# Patient Record
Sex: FEMALE | Race: WHITE | NOT HISPANIC OR LATINO | URBAN - NONMETROPOLITAN AREA
[De-identification: names, ages, dates, MRNs, and addresses within clinical notes are randomized per-mention and may not be internally consistent; named-entity substitution may affect disease eponyms.]

---

## 2023-06-13 ENCOUNTER — PREP FOR SURGERY (OUTPATIENT)
Dept: SURGERY | Facility: HOSPITAL | Age: 78
End: 2023-06-13
Payer: MEDICARE

## 2023-06-13 DIAGNOSIS — H25.12 NUCLEAR SCLEROTIC CATARACT OF LEFT EYE: Primary | ICD-10-CM

## 2023-06-13 RX ORDER — SODIUM CHLORIDE 9 MG/ML
40 INJECTION, SOLUTION INTRAVENOUS AS NEEDED
OUTPATIENT
Start: 2023-06-13

## 2023-06-13 RX ORDER — PREDNISOLONE ACETATE 10 MG/ML
1 SUSPENSION/ DROPS OPHTHALMIC SEE ADMIN INSTRUCTIONS
OUTPATIENT
Start: 2023-06-13

## 2023-06-13 RX ORDER — SODIUM CHLORIDE 0.9 % (FLUSH) 0.9 %
10 SYRINGE (ML) INJECTION AS NEEDED
OUTPATIENT
Start: 2023-06-13

## 2023-06-13 RX ORDER — CYCLOPENT/TROPIC/PHEN/KETR/WAT 1%-1%-2.5%
1 DROPS (EA) OPHTHALMIC (EYE)
OUTPATIENT
Start: 2023-06-13 | End: 2023-06-13

## 2023-06-13 RX ORDER — TETRACAINE HYDROCHLORIDE 5 MG/ML
1 SOLUTION OPHTHALMIC SEE ADMIN INSTRUCTIONS
OUTPATIENT
Start: 2023-06-13

## 2023-06-13 RX ORDER — SODIUM CHLORIDE 0.9 % (FLUSH) 0.9 %
10 SYRINGE (ML) INJECTION EVERY 12 HOURS SCHEDULED
OUTPATIENT
Start: 2023-06-13

## 2023-06-14 PROBLEM — H25.12 NUCLEAR SCLEROTIC CATARACT OF LEFT EYE: Status: ACTIVE | Noted: 2023-06-14

## 2024-11-19 ENCOUNTER — TELEPHONE (OUTPATIENT)
Dept: CARDIAC SURGERY | Facility: CLINIC | Age: 79
End: 2024-11-19
Payer: MEDICARE

## 2024-11-19 ENCOUNTER — HOSPITAL ENCOUNTER (OUTPATIENT)
Dept: CARDIOLOGY | Facility: HOSPITAL | Age: 79
Discharge: HOME | End: 2024-11-19
Payer: MEDICARE

## 2024-11-19 DIAGNOSIS — Z00.6 ENCOUNTER FOR EXAMINATION FOR NORMAL COMPARISON AND CONTROL IN CLINICAL RESEARCH PROGRAM: ICD-10-CM

## 2024-11-19 NOTE — TELEPHONE ENCOUNTER
Provider: BRITTANY    Caller: Abena Washington    Relationship to Patient: Self    Phone Number: [phone]    Reason for Call:     PER PT TO COLLECT HER IMAGING ST PATEL REQUIRES AN IMAGING REQUEST FORM BE SENT TO   [phone]    PT HAS HAD ECHO & STRESS TEST PERFORMED OCT 11    & A HEART CATH ON OCT 30TH     PT WOULD LIKE A PHONE CALL WHEN IMAGING IS RECEIVED

## 2024-12-03 ENCOUNTER — HOSPITAL ENCOUNTER (OUTPATIENT)
Dept: CARDIOLOGY | Facility: HOSPITAL | Age: 79
Discharge: HOME | End: 2024-12-03

## 2024-12-03 DIAGNOSIS — Z00.6 ENCOUNTER FOR EXAMINATION FOR NORMAL COMPARISON OR CONTROL IN CLINICAL RESEARCH PROGRAM: ICD-10-CM

## 2025-01-15 ENCOUNTER — OFFICE VISIT (OUTPATIENT)
Dept: CARDIAC SURGERY | Facility: CLINIC | Age: 80
End: 2025-01-15
Payer: MEDICARE

## 2025-01-15 VITALS
BODY MASS INDEX: 21.83 KG/M2 | TEMPERATURE: 97.9 F | HEART RATE: 57 BPM | SYSTOLIC BLOOD PRESSURE: 138 MMHG | DIASTOLIC BLOOD PRESSURE: 58 MMHG | WEIGHT: 118.6 LBS | HEIGHT: 62 IN | OXYGEN SATURATION: 99 %

## 2025-01-15 DIAGNOSIS — I25.119 CORONARY ARTERY DISEASE INVOLVING NATIVE CORONARY ARTERY OF NATIVE HEART WITH ANGINA PECTORIS: Primary | ICD-10-CM

## 2025-01-15 DIAGNOSIS — I25.10 CORONARY ARTERY DISEASE INVOLVING NATIVE CORONARY ARTERY OF NATIVE HEART, UNSPECIFIED WHETHER ANGINA PRESENT: Primary | ICD-10-CM

## 2025-01-15 RX ORDER — SACUBITRIL AND VALSARTAN 49; 51 MG/1; MG/1
1 TABLET, FILM COATED ORAL 2 TIMES DAILY
COMMUNITY

## 2025-01-15 RX ORDER — ALENDRONATE SODIUM 70 MG/1
70 TABLET ORAL
COMMUNITY
Start: 2024-11-25

## 2025-01-15 RX ORDER — FLUTICASONE PROPIONATE 50 MCG
2 SPRAY, SUSPENSION (ML) NASAL DAILY
COMMUNITY

## 2025-01-15 NOTE — PROGRESS NOTES
[facility] Cardiothoracic Surgery Office Follow Up Note     Date of Encounter: 01/15/2025     Name: Abena Washington  : 1945     Referred By: Eddi Cheatham MD  PCP: Eddi Cheatham MD    Chief Complaint:    Chief Complaint   Patient presents with    Coronary Artery Disease     New patient per Dr. Eddi Cheatham for 2nd opinion CABG eval        Subjective      History of Present Illness:    Abena Washington is a 79 y.o. female presents for second opinion of CAD. She had cardiac catheterization on 10/30/24 at [facility] and was found to have severe three vessel disease including 30-40% left main stenosis, 100% chronic total occlusion of LAD, 70% diagonal branch, 70% left circumflex, 70% stenosis OM1, and 95% RCA stenosis.     Patient is having very frequent episodes of chest pressure. This is with activity and at rest, and even exposure to cold air can trigger an episode of chest pressure. This is relieved with belching and episodes resolve quickly. She is having numbness and tingling of the fingers bilaterally.   She has a strong family history of CAD. Dr. Del Castillo performed a CABG on her son about 8 years ago, which is why she felt strongly about getting a second opinion.     She denies previous thoracic surgery or radiation to the chest. She has recently had an excision of a skin lesion found to be melanoma on her right upper scapular region but has received no further treatment as of yet.   Of note, when the cardiac catheterization was performed, patient states they attempted access via right radial but were unable and had to use femoral access. Patient is right handed.     Review of Systems:  Review of Systems   Constitutional: Negative for chills, decreased appetite, diaphoresis, fever, malaise/fatigue, night sweats, weight gain and weight loss.   HENT:  Negative for hoarse voice.    Eyes:  Negative for blurred vision, double vision and visual disturbance.   Cardiovascular:  Positive for chest pain (pressure  "in chest, sometimes slight \"twinge\" of pain). Negative for claudication, dyspnea on exertion, irregular heartbeat, leg swelling, near-syncope, orthopnea, palpitations, paroxysmal nocturnal dyspnea and syncope.   Respiratory:  Negative for cough, hemoptysis, shortness of breath, sputum production and wheezing.    Hematologic/Lymphatic: Negative for adenopathy and bleeding problem. Does not bruise/bleed easily.   Skin:  Negative for color change, nail changes, poor wound healing and rash.   Musculoskeletal:  Negative for back pain, falls and muscle cramps.   Gastrointestinal:  Positive for heartburn (burping with the episodes of chest pressure). Negative for abdominal pain and dysphagia.   Genitourinary:  Negative for flank pain.   Neurological:  Positive for dizziness. Negative for brief paralysis, disturbances in coordination, focal weakness, headaches, light-headedness, loss of balance, numbness, paresthesias, sensory change, vertigo and weakness.   Psychiatric/Behavioral:  Negative for depression and suicidal ideas.    Allergic/Immunologic: Negative for persistent infections.       I have reviewed the following portions of the patient's history: problem list, current medications, allergies, past surgical history, past medical history, past social history, past family history, and ROS and confirm it's accurate.    Allergies:  No Known Allergies    Medications:      Current Outpatient Medications:     alendronate (FOSAMAX) 70 MG tablet, Take 1 tablet by mouth Every 7 (Seven) Days., Disp: , Rfl:     amLODIPine (NORVASC) 10 MG tablet, Take 1 tablet by mouth Daily., Disp: , Rfl:     aspirin 81 MG EC tablet, Take 1 tablet by mouth Daily., Disp: , Rfl:     carvedilol (COREG) 12.5 MG tablet, Take 1 tablet by mouth 2 (Two) Times a Day With Meals., Disp: , Rfl:     empagliflozin (JARDIANCE) 10 MG tablet tablet, Take  by mouth Daily., Disp: , Rfl:     fluticasone (FLONASE) 50 MCG/ACT nasal spray, Administer 2 sprays into the " nostril(s) as directed by provider Daily., Disp: , Rfl:     gabapentin (NEURONTIN) 300 MG capsule, Take 1 capsule by mouth 2 (Two) Times a Day As Needed., Disp: , Rfl:     glipizide (GLUCOTROL) 10 MG tablet, Take 2 tablets by mouth 2 (Two) Times a Day Before Meals., Disp: , Rfl:     hydroCHLOROthiazide (HYDRODIURIL) 25 MG tablet, Take 1 tablet by mouth Daily., Disp: , Rfl:     metFORMIN (GLUCOPHAGE) 1000 MG tablet, Take 1 tablet by mouth 2 (Two) Times a Day With Meals., Disp: , Rfl:     naproxen (NAPROSYN) 500 MG tablet, Take 1 tablet by mouth 2 (Two) Times a Day As Needed for Mild Pain., Disp: , Rfl:     pioglitazone (ACTOS) 45 MG tablet, Take 1 tablet by mouth Daily As Needed., Disp: , Rfl:     potassium chloride (K-DUR,KLOR-CON) 20 MEQ CR tablet, Take 1 tablet by mouth 2 (Two) Times a Day., Disp: , Rfl:     potassium chloride (KLOR-CON) 20 MEQ packet, Take 20 mEq by mouth Daily., Disp: , Rfl:     sacubitril-valsartan (Entresto) 49-51 MG tablet, Take 1 tablet by mouth 2 (Two) Times a Day., Disp: , Rfl:     SITagliptin (JANUVIA) 100 MG tablet, Take 1 tablet by mouth Daily., Disp: , Rfl:     difluprednate (DUREZOL) 0.05 % ophthalmic emulsion, Follow Instructions on AVS (Patient not taking: Reported on 1/15/2025), Disp: , Rfl:     difluprednate (DUREZOL) 0.05 % ophthalmic emulsion, Follow Instructions on AVS (Patient not taking: Reported on 1/15/2025), Disp: , Rfl:     enalapril (VASOTEC) 20 MG tablet, Take 1 tablet by mouth 2 (Two) Times a Day. (Patient not taking: Reported on 1/15/2025), Disp: , Rfl:     lovastatin (MEVACOR) 40 MG tablet, Take 1 tablet by mouth 2 (Two) Times a Day. (Patient not taking: Reported on 1/15/2025), Disp: , Rfl:     History:   Past Medical History:   Diagnosis Date    Anemia     Anxiety and depression     Arthritis     Delayed emergence from anesthesia     Diabetes mellitus     Heart attack     Hyperlipidemia     Hypertension     Nausea     Peripheral neuropathy     PONV (postoperative  "nausea and vomiting)     Skin melanoma     Wears glasses        Past Surgical History:   Procedure Laterality Date    CARDIAC CATHETERIZATION      CATARACT EXTRACTION W/ INTRAOCULAR LENS IMPLANT Left 06/21/2023    Procedure: CATARACT PHACO EXTRACTION WITH INTRAOCULAR LENS IMPLANT LEFT;  Surgeon: Mina Hendrickson MD;  Location: [facility];  Service: Ophthalmology;  Laterality: Left;    CATARACT EXTRACTION W/ INTRAOCULAR LENS IMPLANT Right 07/07/2023    Procedure: CATARACT PHACO EXTRACTION WITH INTRAOCULAR LENS IMPLANT RIGHT;  Surgeon: Mina Hendrickson MD;  Location: [facility];  Service: Ophthalmology;  Laterality: Right;    CHOLECYSTECTOMY      COLONOSCOPY      ENDOSCOPY      HYSTERECTOMY      SKIN CANCER EXCISION      TUBAL ABDOMINAL LIGATION         Social History     Socioeconomic History    Marital status: Unknown   Tobacco Use    Smoking status: Never    Smokeless tobacco: Never   Vaping Use    Vaping status: Never Used   Substance and Sexual Activity    Alcohol use: Never    Drug use: Never    Sexual activity: Defer        Family History   Problem Relation Age of Onset    Diabetes Mother     Dementia Mother     Coronary artery disease Father     Stroke Son     Coronary artery disease Son        Objective     Physical Exam:  Vitals:    01/15/25 0957   BP: 138/58   BP Location: Right arm   Patient Position: Sitting   Pulse: 57   Temp: 97.9 °F (36.6 °C)   SpO2: 99%   Weight: 53.8 kg (118 lb 9.6 oz)   Height: 157.5 cm (62\")      Body mass index is 21.69 kg/m².    Physical Exam  Vitals and nursing note reviewed.   Constitutional:       Appearance: Normal appearance.   Cardiovascular:      Rate and Rhythm: Normal rate and regular rhythm.      Pulses: Normal pulses.           Radial pulses are 2+ on the right side and 2+ on the left side.      Heart sounds: Normal heart sounds.   Pulmonary:      Effort: Pulmonary effort is normal.      Breath sounds: Normal breath sounds.   Musculoskeletal:      Right lower " leg: No edema.      Left lower leg: No edema.   Skin:     Capillary Refill: Capillary refill takes less than 2 seconds.   Neurological:      General: No focal deficit present.      Mental Status: She is alert and oriented to person, place, and time.   Psychiatric:         Mood and Affect: Mood normal.         Behavior: Behavior normal.         Imaging/Labs:  Cath images and report reviewed       Assessment / Plan      Assessment / Plan:    Severe three vessel CAD   30-40% left main stenosis, 100% chronic total occlusion of LAD, 70% diagonal branch, 70% left circumflex, 70% stenosis OM1, and 95% RCA stenosis  Patient has diabetes A1c 6.9%, HTN, HLD and is a reasonable candidate for CABG.   Discussed the procedure in great detail and answered all questions. Risks, benefits, and alternatives were presented to the patient and through shared decision making, the patient elects to proceed with procedure.   Episodes of chest pressure several days of the week. Advised patient to present to ED with further episodes of chest pressure  Discussed LEAAPS trial, but patient not a candidate due to sensitivity to nickel jewelry.       Follow Up:   Schedule for CABG as soon as possible   Advised patient that if she has further episodes of chest pressure, she needs to present to the nearest ED      Mine Hanson PA-C   [facility] Cardiothoracic Surgery      Time Spent: I spent 45 minutes caring for Abena on this date of service. This time includes time spent by me in the following activities: preparing for the visit, reviewing tests, obtaining and/or reviewing a separately obtained history, performing a medically appropriate examination and/or evaluation, counseling and educating the patient/family/caregiver, ordering medications, tests, or procedures, referring and communicating with other health care professionals, documenting information in the medical record, independently interpreting results and communicating that  information with the patient/family/caregiver, and care coordination.

## 2025-01-16 ENCOUNTER — HOSPITAL ENCOUNTER (OUTPATIENT)
Dept: ULTRASOUND IMAGING | Facility: HOSPITAL | Age: 80
Discharge: HOME | End: 2025-01-16
Admitting: PHYSICIAN ASSISTANT
Payer: MEDICARE

## 2025-01-16 ENCOUNTER — PREP FOR SURGERY (OUTPATIENT)
Dept: OTHER | Facility: HOSPITAL | Age: 80
End: 2025-01-16
Payer: MEDICARE

## 2025-01-16 DIAGNOSIS — Z51.81 ENCOUNTER FOR THERAPEUTIC DRUG LEVEL MONITORING: ICD-10-CM

## 2025-01-16 DIAGNOSIS — R79.1 ABNORMAL COAGULATION PROFILE: ICD-10-CM

## 2025-01-16 DIAGNOSIS — I25.10 CORONARY ARTERY DISEASE INVOLVING NATIVE CORONARY ARTERY OF NATIVE HEART, UNSPECIFIED WHETHER ANGINA PRESENT: ICD-10-CM

## 2025-01-16 DIAGNOSIS — I25.119 CORONARY ARTERY DISEASE INVOLVING NATIVE CORONARY ARTERY OF NATIVE HEART WITH ANGINA PECTORIS: Primary | ICD-10-CM

## 2025-01-16 DIAGNOSIS — R79.9 ABNORMAL FINDING OF BLOOD CHEMISTRY, UNSPECIFIED: ICD-10-CM

## 2025-01-16 PROCEDURE — 93880 EXTRACRANIAL BILAT STUDY: CPT

## 2025-01-17 RX ORDER — CHLORHEXIDINE GLUCONATE 500 MG/1
CLOTH TOPICAL EVERY 12 HOURS PRN
OUTPATIENT
Start: 2025-01-21

## 2025-01-17 RX ORDER — CHLORHEXIDINE GLUCONATE ORAL RINSE 1.2 MG/ML
15 SOLUTION DENTAL ONCE
OUTPATIENT
Start: 2025-01-21 | End: 2025-01-21

## 2025-01-17 RX ORDER — NITROGLYCERIN 0.4 MG/1
0.4 TABLET SUBLINGUAL
OUTPATIENT
Start: 2025-01-21

## 2025-01-17 RX ORDER — GABAPENTIN 300 MG/1
300 CAPSULE ORAL ONCE
OUTPATIENT
Start: 2025-01-17 | End: 2025-01-17

## 2025-01-17 RX ORDER — ASPIRIN 325 MG
325 TABLET ORAL NIGHTLY
OUTPATIENT
Start: 2025-01-20 | End: 2025-01-21

## 2025-01-17 RX ORDER — CHLORHEXIDINE GLUCONATE 500 MG/1
1 CLOTH TOPICAL EVERY 12 HOURS PRN
OUTPATIENT
Start: 2025-01-20

## 2025-01-27 ENCOUNTER — HOSPITAL ENCOUNTER (OUTPATIENT)
Dept: GENERAL RADIOLOGY | Facility: HOSPITAL | Age: 80
Discharge: HOME | End: 2025-01-27
Payer: MEDICARE

## 2025-01-27 ENCOUNTER — HOSPITAL ENCOUNTER (OUTPATIENT)
Dept: PULMONOLOGY | Facility: HOSPITAL | Age: 80
Discharge: HOME | End: 2025-01-27
Payer: MEDICARE

## 2025-01-27 ENCOUNTER — PRE-ADMISSION TESTING (OUTPATIENT)
Dept: PREADMISSION TESTING | Facility: HOSPITAL | Age: 80
DRG: 235 | End: 2025-01-27
Payer: MEDICARE

## 2025-01-27 VITALS — HEIGHT: 62 IN | OXYGEN SATURATION: 99 % | HEART RATE: 68 BPM | BODY MASS INDEX: 22.19 KG/M2 | WEIGHT: 120.59 LBS

## 2025-01-27 DIAGNOSIS — R79.1 ABNORMAL COAGULATION PROFILE: ICD-10-CM

## 2025-01-27 DIAGNOSIS — R79.9 ABNORMAL FINDING OF BLOOD CHEMISTRY, UNSPECIFIED: ICD-10-CM

## 2025-01-27 DIAGNOSIS — Z51.81 ENCOUNTER FOR THERAPEUTIC DRUG LEVEL MONITORING: ICD-10-CM

## 2025-01-27 DIAGNOSIS — I25.119 CORONARY ARTERY DISEASE INVOLVING NATIVE CORONARY ARTERY OF NATIVE HEART WITH ANGINA PECTORIS: ICD-10-CM

## 2025-01-27 LAB
ABO GROUP BLD: NORMAL
ALBUMIN SERPL-MCNC: 4.7 G/DL (ref 3.5–5.2)
ALBUMIN/GLOB SERPL: 1.5 G/DL
ALP SERPL-CCNC: 51 U/L (ref 39–117)
ALT SERPL W P-5'-P-CCNC: 14 U/L (ref 1–33)
AMPHET+METHAMPHET UR QL: NEGATIVE
AMPHETAMINES UR QL: NEGATIVE
ANION GAP SERPL CALCULATED.3IONS-SCNC: 14 MMOL/L (ref 5–15)
APTT PPP: 21.7 SECONDS (ref 22–39)
AST SERPL-CCNC: 15 U/L (ref 1–32)
BARBITURATES UR QL SCN: NEGATIVE
BASOPHILS # BLD AUTO: 0.06 10*3/MM3 (ref 0–0.2)
BASOPHILS NFR BLD AUTO: 0.7 % (ref 0–1.5)
BENZODIAZ UR QL SCN: NEGATIVE
BILIRUB SERPL-MCNC: 0.2 MG/DL (ref 0–1.2)
BLD GP AB SCN SERPL QL: NEGATIVE
BUN SERPL-MCNC: 25 MG/DL (ref 8–23)
BUN/CREAT SERPL: 22.3 (ref 7–25)
BUPRENORPHINE SERPL-MCNC: NEGATIVE NG/ML
CALCIUM SPEC-SCNC: 9.7 MG/DL (ref 8.6–10.5)
CANNABINOIDS SERPL QL: NEGATIVE
CHLORIDE SERPL-SCNC: 98 MMOL/L (ref 98–107)
CO2 SERPL-SCNC: 27 MMOL/L (ref 22–29)
COCAINE UR QL: NEGATIVE
CREAT SERPL-MCNC: 1.12 MG/DL (ref 0.57–1)
DEPRECATED RDW RBC AUTO: 45.6 FL (ref 37–54)
EGFRCR SERPLBLD CKD-EPI 2021: 50.8 ML/MIN/1.73
EOSINOPHIL # BLD AUTO: 0.1 10*3/MM3 (ref 0–0.4)
EOSINOPHIL NFR BLD AUTO: 1.2 % (ref 0.3–6.2)
ERYTHROCYTE [DISTWIDTH] IN BLOOD BY AUTOMATED COUNT: 13.5 % (ref 12.3–15.4)
FENTANYL UR-MCNC: NEGATIVE NG/ML
GLOBULIN UR ELPH-MCNC: 3.1 GM/DL
GLUCOSE SERPL-MCNC: 102 MG/DL (ref 65–99)
HBA1C MFR BLD: 8.3 % (ref 4.8–5.6)
HCT VFR BLD AUTO: 42.9 % (ref 34–46.6)
HGB BLD-MCNC: 13.6 G/DL (ref 12–15.9)
IMM GRANULOCYTES # BLD AUTO: 0.02 10*3/MM3 (ref 0–0.05)
IMM GRANULOCYTES NFR BLD AUTO: 0.2 % (ref 0–0.5)
INR PPP: 0.91 (ref 0.89–1.12)
LYMPHOCYTES # BLD AUTO: 2.81 10*3/MM3 (ref 0.7–3.1)
LYMPHOCYTES NFR BLD AUTO: 34.6 % (ref 19.6–45.3)
MAGNESIUM SERPL-MCNC: 2.2 MG/DL (ref 1.6–2.4)
MCH RBC QN AUTO: 28.9 PG (ref 26.6–33)
MCHC RBC AUTO-ENTMCNC: 31.7 G/DL (ref 31.5–35.7)
MCV RBC AUTO: 91.1 FL (ref 79–97)
METHADONE UR QL SCN: NEGATIVE
MONOCYTES # BLD AUTO: 0.51 10*3/MM3 (ref 0.1–0.9)
MONOCYTES NFR BLD AUTO: 6.3 % (ref 5–12)
NEUTROPHILS NFR BLD AUTO: 4.62 10*3/MM3 (ref 1.7–7)
NEUTROPHILS NFR BLD AUTO: 57 % (ref 42.7–76)
NRBC BLD AUTO-RTO: 0 /100 WBC (ref 0–0.2)
OPIATES UR QL: NEGATIVE
OXYCODONE UR QL SCN: NEGATIVE
PA ADP PRP-ACNC: 216 PRU
PCP UR QL SCN: NEGATIVE
PLATELET # BLD AUTO: 282 10*3/MM3 (ref 140–450)
PMV BLD AUTO: 11.5 FL (ref 6–12)
POTASSIUM SERPL-SCNC: 4.2 MMOL/L (ref 3.5–5.2)
PROT SERPL-MCNC: 7.8 G/DL (ref 6–8.5)
PROTHROMBIN TIME: 12.4 SECONDS (ref 12.2–14.5)
QT INTERVAL: 286 MS
QTC INTERVAL: 297 MS
RBC # BLD AUTO: 4.71 10*6/MM3 (ref 3.77–5.28)
RH BLD: POSITIVE
SODIUM SERPL-SCNC: 139 MMOL/L (ref 136–145)
T&S EXPIRATION DATE: NORMAL
TRICYCLICS UR QL SCN: NEGATIVE
WBC NRBC COR # BLD AUTO: 8.12 10*3/MM3 (ref 3.4–10.8)

## 2025-01-27 PROCEDURE — 93005 ELECTROCARDIOGRAM TRACING: CPT

## 2025-01-27 PROCEDURE — 86901 BLOOD TYPING SEROLOGIC RH(D): CPT

## 2025-01-27 PROCEDURE — 85025 COMPLETE CBC W/AUTO DIFF WBC: CPT

## 2025-01-27 PROCEDURE — 71046 X-RAY EXAM CHEST 2 VIEWS: CPT

## 2025-01-27 PROCEDURE — 85576 BLOOD PLATELET AGGREGATION: CPT

## 2025-01-27 PROCEDURE — 93010 ELECTROCARDIOGRAM REPORT: CPT | Performed by: INTERNAL MEDICINE

## 2025-01-27 PROCEDURE — 85610 PROTHROMBIN TIME: CPT

## 2025-01-27 PROCEDURE — 86923 COMPATIBILITY TEST ELECTRIC: CPT

## 2025-01-27 PROCEDURE — 86850 RBC ANTIBODY SCREEN: CPT

## 2025-01-27 PROCEDURE — 83735 ASSAY OF MAGNESIUM: CPT

## 2025-01-27 PROCEDURE — 86900 BLOOD TYPING SEROLOGIC ABO: CPT

## 2025-01-27 PROCEDURE — 94010 BREATHING CAPACITY TEST: CPT

## 2025-01-27 PROCEDURE — 83036 HEMOGLOBIN GLYCOSYLATED A1C: CPT

## 2025-01-27 PROCEDURE — 36415 COLL VENOUS BLD VENIPUNCTURE: CPT

## 2025-01-27 PROCEDURE — 80053 COMPREHEN METABOLIC PANEL: CPT

## 2025-01-27 PROCEDURE — 85730 THROMBOPLASTIN TIME PARTIAL: CPT

## 2025-01-27 PROCEDURE — 80307 DRUG TEST PRSMV CHEM ANLYZR: CPT

## 2025-01-27 RX ORDER — MULTIVITAMIN,THERAPEUTIC
0.5 TABLET ORAL DAILY
Status: ON HOLD | COMMUNITY

## 2025-01-27 RX ORDER — ASPIRIN 325 MG
325 TABLET ORAL NIGHTLY
Status: ACTIVE | OUTPATIENT
Start: 2025-01-27 | End: 2025-01-28

## 2025-01-27 RX ORDER — ATORVASTATIN CALCIUM 40 MG/1
40 TABLET, FILM COATED ORAL DAILY
Status: ON HOLD | COMMUNITY
Start: 2025-01-09

## 2025-01-27 RX ORDER — CHLORHEXIDINE GLUCONATE 500 MG/1
1 CLOTH TOPICAL EVERY 12 HOURS PRN
Status: ACTIVE | OUTPATIENT
Start: 2025-01-27

## 2025-01-27 NOTE — PAT
An arrival time for procedure was not provided during PAT visit. If patient had any questions or concerns about their arrival time, they were instructed to contact their surgeon/physician.  Additionally, if the patient referred to an arrival time that was acquired from their my chart account, patient was encouraged to verify that time with their surgeon/physician. Arrival times are NOT provided in Pre Admission Testing Department.    Patient viewed general PAT education video as instructed in their preoperative information received from their surgeon.  Patient stated the general PAT education video was viewed in its entirety and survey completed.  Copies of PAT general education handouts (Incentive Spirometry, Meds to Beds Program, Patient Belongings, Pre-op skin preparation instructions, Blood Glucose testing, Visitor policy, Surgery FAQ, Code H) distributed to patient if not printed. Education related to the PAT pass and skin preparation for surgery (if applicable) completed in PAT as a reinforcement to PAT education video. Patient instructed to return PAT pass provided today as well as completed skin preparation sheet (if applicable) on the day of procedure.     Additionally if patient had not viewed video yet but intended to view it at home or in our waiting area, then referred them to the handout with QR code/link provided during PAT visit.  Instructed patient to complete survey after viewing the video in its entirety.  Encouraged patient/family to read PAT general education handouts thoroughly and notify PAT staff with any questions or concerns. Patient verbalized understanding of all information and priority content.    Patient denies any current skin issues.     Patient to apply Chlorhexadine wipes  to surgical area (as instructed) the night before procedure and the AM of procedure. Wipes provided.    Patient instructed to drink 20 ounces of Gatorade or Gatorlyte (if diabetic) and it needs to be completed 1  hour (for Main OR patients) or 2 hours (scheduled  section & [facility] patients) before given arrival time for procedure (NO RED Gatorade and NO Gatorade Zero).    Patient verbalized understanding.    Bactroban to be applied to each nostril during PAT visit if surgery the following day.  If surgery NOT the following day, then Bactroban supplied to patient with instructions both written and verbally to insert Bactroban into each nares the night before surgery.    Per Anesthesia Request, patient instructed not to take their ACE/ARB medications on the AM of surgery.    CABG Education during PAT visit included general perioperative instructions that are routine for all surgical patients (PAT PASS, wipes, directions to pre-op,etc.).  Additionally, a handout was provided and discussed that stressed the importance of Honesty, Incentive Spirometry use, Ambulation, and Pain control.  This handout also explained the tubes in place during immediate post op recovery.  Verbal instructions given as well.     Patient and/or family verbalized understanding of education and reinforcement was provided as needed.    Instructed patient to take 325 mg of Aspirin the night before heart surgery as per CT surgeon's order.  Patient and/or family verbalized understanding.    Blood bank bracelet applied to patient during Pre Admission Testing visit.  Patient instructed not to remove from arm until after procedure and they are discharged from the hospital.  Explained to patient that they may shower and get the bracelet wet, but not to immerse under water for longer periods (bathing, swimming, hand dishwashing, etc).  Patient verbalized understanding.    Patient directed to Radiology Department for CXR after Pre Admission Testing Appointment.     Patient directed to Respiratory Department after Pre Admission Testing visit for Pulmonary Function Test.

## 2025-01-28 ENCOUNTER — ANESTHESIA EVENT (OUTPATIENT)
Dept: PERIOP | Facility: HOSPITAL | Age: 80
End: 2025-01-28
Payer: MEDICARE

## 2025-01-28 ENCOUNTER — ANESTHESIA EVENT CONVERTED (OUTPATIENT)
Dept: ANESTHESIOLOGY | Facility: HOSPITAL | Age: 80
DRG: 235 | End: 2025-01-28
Payer: MEDICARE

## 2025-01-28 ENCOUNTER — HOSPITAL ENCOUNTER (INPATIENT)
Facility: HOSPITAL | Age: 80
LOS: 8 days | Discharge: HOME HEALTH | DRG: 235 | End: 2025-02-05
Attending: THORACIC SURGERY (CARDIOTHORACIC VASCULAR SURGERY) | Admitting: THORACIC SURGERY (CARDIOTHORACIC VASCULAR SURGERY)
Payer: MEDICARE

## 2025-01-28 ENCOUNTER — ANCILLARY PROCEDURE (OUTPATIENT)
Dept: PERIOP | Facility: HOSPITAL | Age: 80
DRG: 235 | End: 2025-01-28
Payer: MEDICARE

## 2025-01-28 ENCOUNTER — ANESTHESIA (OUTPATIENT)
Dept: PERIOP | Facility: HOSPITAL | Age: 80
End: 2025-01-28
Payer: MEDICARE

## 2025-01-28 ENCOUNTER — APPOINTMENT (OUTPATIENT)
Dept: GENERAL RADIOLOGY | Facility: HOSPITAL | Age: 80
DRG: 235 | End: 2025-01-28
Payer: MEDICARE

## 2025-01-28 DIAGNOSIS — I25.119 CORONARY ARTERY DISEASE INVOLVING NATIVE CORONARY ARTERY OF NATIVE HEART WITH ANGINA PECTORIS: ICD-10-CM

## 2025-01-28 DIAGNOSIS — I25.10 CORONARY ARTERY DISEASE INVOLVING NATIVE CORONARY ARTERY OF NATIVE HEART, UNSPECIFIED WHETHER ANGINA PRESENT: ICD-10-CM

## 2025-01-28 LAB
ABO GROUP BLD: NORMAL
ACT BLD: 141 SECONDS (ref 82–152)
ALBUMIN SERPL-MCNC: 4.3 G/DL (ref 3.5–5.2)
ALBUMIN SERPL-MCNC: 4.7 G/DL (ref 3.5–5.2)
ANION GAP SERPL CALCULATED.3IONS-SCNC: 10 MMOL/L (ref 5–15)
ANION GAP SERPL CALCULATED.3IONS-SCNC: 12 MMOL/L (ref 5–15)
APTT PPP: 29.7 SECONDS (ref 22–39)
ARTERIAL PATENCY WRIST A: ABNORMAL
ATMOSPHERIC PRESS: ABNORMAL MM[HG]
BASE EXCESS BLDA CALC-SCNC: -0.2 MMOL/L (ref 0–2)
BASE EXCESS BLDA CALC-SCNC: -0.5 MMOL/L (ref 0–2)
BASE EXCESS BLDA CALC-SCNC: -0.9 MMOL/L (ref 0–2)
BASE EXCESS BLDA CALC-SCNC: 0.1 MMOL/L (ref 0–2)
BASE EXCESS BLDA CALC-SCNC: 0.3 MMOL/L (ref 0–2)
BASE EXCESS BLDA CALC-SCNC: 3.2 MMOL/L (ref 0–2)
BDY SITE: ABNORMAL
BODY TEMPERATURE: 37
BUN SERPL-MCNC: 18 MG/DL (ref 8–23)
BUN SERPL-MCNC: 18 MG/DL (ref 8–23)
BUN/CREAT SERPL: 15.3 (ref 7–25)
BUN/CREAT SERPL: 17 (ref 7–25)
CA-I SERPL ISE-MCNC: 1.18 MMOL/L (ref 1.15–1.3)
CALCIUM SPEC-SCNC: 9 MG/DL (ref 8.6–10.5)
CALCIUM SPEC-SCNC: 9.9 MG/DL (ref 8.6–10.5)
CHLORIDE SERPL-SCNC: 106 MMOL/L (ref 98–107)
CHLORIDE SERPL-SCNC: 108 MMOL/L (ref 98–107)
CO2 BLDA-SCNC: 27 MMOL/L (ref 22–33)
CO2 BLDA-SCNC: 27.5 MMOL/L (ref 22–33)
CO2 BLDA-SCNC: 28.3 MMOL/L (ref 22–33)
CO2 BLDA-SCNC: 28.4 MMOL/L (ref 22–33)
CO2 BLDA-SCNC: 28.8 MMOL/L (ref 22–33)
CO2 BLDA-SCNC: 29.5 MMOL/L (ref 22–33)
CO2 SERPL-SCNC: 28 MMOL/L (ref 22–29)
CO2 SERPL-SCNC: 28 MMOL/L (ref 22–29)
COHGB MFR BLD: 0.7 % (ref 0–2)
COHGB MFR BLD: 1 % (ref 0–2)
COHGB MFR BLD: 1.1 % (ref 0–2)
COHGB MFR BLD: 1.1 % (ref 0–2)
CREAT SERPL-MCNC: 1.06 MG/DL (ref 0.57–1)
CREAT SERPL-MCNC: 1.18 MG/DL (ref 0.57–1)
DEPRECATED RDW RBC AUTO: 44.5 FL (ref 37–54)
DEPRECATED RDW RBC AUTO: 47.3 FL (ref 37–54)
EGFRCR SERPLBLD CKD-EPI 2021: 47.7 ML/MIN/1.73
EGFRCR SERPLBLD CKD-EPI 2021: 54.2 ML/MIN/1.73
EPAP: 0
ERYTHROCYTE [DISTWIDTH] IN BLOOD BY AUTOMATED COUNT: 13.7 % (ref 12.3–15.4)
ERYTHROCYTE [DISTWIDTH] IN BLOOD BY AUTOMATED COUNT: 14.1 % (ref 12.3–15.4)
GLUCOSE BLDC GLUCOMTR-MCNC: 107 MG/DL (ref 70–130)
GLUCOSE BLDC GLUCOMTR-MCNC: 125 MG/DL (ref 70–130)
GLUCOSE BLDC GLUCOMTR-MCNC: 133 MG/DL (ref 70–130)
GLUCOSE BLDC GLUCOMTR-MCNC: 134 MG/DL (ref 70–130)
GLUCOSE BLDC GLUCOMTR-MCNC: 145 MG/DL (ref 70–130)
GLUCOSE BLDC GLUCOMTR-MCNC: 173 MG/DL (ref 70–130)
GLUCOSE BLDC GLUCOMTR-MCNC: 208 MG/DL (ref 70–130)
GLUCOSE BLDC GLUCOMTR-MCNC: 225 MG/DL (ref 70–130)
GLUCOSE BLDC GLUCOMTR-MCNC: 242 MG/DL (ref 70–130)
GLUCOSE BLDC GLUCOMTR-MCNC: 246 MG/DL (ref 70–130)
GLUCOSE BLDC GLUCOMTR-MCNC: 273 MG/DL (ref 70–130)
GLUCOSE SERPL-MCNC: 127 MG/DL (ref 65–99)
GLUCOSE SERPL-MCNC: 197 MG/DL (ref 65–99)
HCO3 BLDA-SCNC: 25.5 MMOL/L (ref 20–26)
HCO3 BLDA-SCNC: 26.4 MMOL/L (ref 20–26)
HCO3 BLDA-SCNC: 26.5 MMOL/L (ref 20–26)
HCO3 BLDA-SCNC: 26.8 MMOL/L (ref 20–26)
HCO3 BLDA-SCNC: 26.9 MMOL/L (ref 20–26)
HCO3 BLDA-SCNC: 27.6 MMOL/L (ref 20–26)
HCT VFR BLD AUTO: 25.1 % (ref 34–46.6)
HCT VFR BLD AUTO: 26.1 % (ref 34–46.6)
HCT VFR BLD CALC: 24.7 % (ref 38–51)
HCT VFR BLD CALC: 25.3 % (ref 38–51)
HCT VFR BLD CALC: 25.4 % (ref 38–51)
HCT VFR BLD CALC: 25.5 % (ref 38–51)
HCT VFR BLD CALC: 26.1 % (ref 38–51)
HCT VFR BLD CALC: 26.5 % (ref 38–51)
HGB BLD-MCNC: 8 G/DL (ref 12–15.9)
HGB BLD-MCNC: 8.7 G/DL (ref 12–15.9)
HGB BLDA-MCNC: 8.1 G/DL (ref 14–18)
HGB BLDA-MCNC: 8.3 G/DL (ref 14–18)
HGB BLDA-MCNC: 8.5 G/DL (ref 14–18)
HGB BLDA-MCNC: 8.7 G/DL (ref 14–18)
INHALED O2 CONCENTRATION: 100 %
INHALED O2 CONCENTRATION: 40 %
INR PPP: 1.43 (ref 0.89–1.12)
IPAP: 0
MAGNESIUM SERPL-MCNC: 2.7 MG/DL (ref 1.6–2.4)
MAGNESIUM SERPL-MCNC: 3.1 MG/DL (ref 1.6–2.4)
MCH RBC QN AUTO: 29.3 PG (ref 26.6–33)
MCH RBC QN AUTO: 29.5 PG (ref 26.6–33)
MCHC RBC AUTO-ENTMCNC: 31.9 G/DL (ref 31.5–35.7)
MCHC RBC AUTO-ENTMCNC: 33.3 G/DL (ref 31.5–35.7)
MCV RBC AUTO: 88.5 FL (ref 79–97)
MCV RBC AUTO: 91.9 FL (ref 79–97)
METHGB BLD QL: 0.6 % (ref 0–1.5)
METHGB BLD QL: 0.7 % (ref 0–1.5)
METHGB BLD QL: 0.7 % (ref 0–1.5)
METHGB BLD QL: 0.8 % (ref 0–1.5)
METHGB BLD QL: 0.9 % (ref 0–1.5)
METHGB BLD QL: 0.9 % (ref 0–1.5)
MODALITY: ABNORMAL
OXYHGB MFR BLDV: 95.7 % (ref 94–99)
OXYHGB MFR BLDV: 96.1 % (ref 94–99)
OXYHGB MFR BLDV: 97 % (ref 94–99)
OXYHGB MFR BLDV: 97.7 % (ref 94–99)
OXYHGB MFR BLDV: 97.9 % (ref 94–99)
OXYHGB MFR BLDV: 99 % (ref 94–99)
PAW @ PEAK INSP FLOW SETTING VENT: 0 CMH2O
PCO2 BLDA: 33.7 MM HG (ref 35–45)
PCO2 BLDA: 46.5 MM HG (ref 35–45)
PCO2 BLDA: 52.2 MM HG (ref 35–45)
PCO2 BLDA: 58.5 MM HG (ref 35–45)
PCO2 BLDA: 60.5 MM HG (ref 35–45)
PCO2 BLDA: 61.1 MM HG (ref 35–45)
PCO2 TEMP ADJ BLD: 33.7 MM HG (ref 35–45)
PCO2 TEMP ADJ BLD: 46.5 MM HG (ref 35–45)
PCO2 TEMP ADJ BLD: 52.2 MM HG (ref 35–45)
PCO2 TEMP ADJ BLD: 58.5 MM HG (ref 35–45)
PCO2 TEMP ADJ BLD: 60.5 MM HG (ref 35–45)
PCO2 TEMP ADJ BLD: 61.1 MM HG (ref 35–45)
PEEP RESPIRATORY: 5 CM[H2O]
PH BLDA: 7.26 PH UNITS (ref 7.35–7.45)
PH BLDA: 7.32 PH UNITS (ref 7.35–7.45)
PH BLDA: 7.35 PH UNITS (ref 7.35–7.45)
PH BLDA: 7.5 PH UNITS (ref 7.35–7.45)
PH, TEMP CORRECTED: 7.26 PH UNITS
PH, TEMP CORRECTED: 7.32 PH UNITS
PH, TEMP CORRECTED: 7.35 PH UNITS
PH, TEMP CORRECTED: 7.5 PH UNITS
PHOSPHATE SERPL-MCNC: 3.2 MG/DL (ref 2.5–4.5)
PHOSPHATE SERPL-MCNC: 4.1 MG/DL (ref 2.5–4.5)
PLATELET # BLD AUTO: 117 10*3/MM3 (ref 140–450)
PLATELET # BLD AUTO: 134 10*3/MM3 (ref 140–450)
PMV BLD AUTO: 11.3 FL (ref 6–12)
PMV BLD AUTO: 11.4 FL (ref 6–12)
PO2 BLDA: 103 MM HG (ref 83–108)
PO2 BLDA: 104 MM HG (ref 83–108)
PO2 BLDA: 123 MM HG (ref 83–108)
PO2 BLDA: 136 MM HG (ref 83–108)
PO2 BLDA: 139 MM HG (ref 83–108)
PO2 BLDA: 383 MM HG (ref 83–108)
PO2 TEMP ADJ BLD: 103 MM HG (ref 83–108)
PO2 TEMP ADJ BLD: 104 MM HG (ref 83–108)
PO2 TEMP ADJ BLD: 123 MM HG (ref 83–108)
PO2 TEMP ADJ BLD: 136 MM HG (ref 83–108)
PO2 TEMP ADJ BLD: 139 MM HG (ref 83–108)
PO2 TEMP ADJ BLD: 383 MM HG (ref 83–108)
POTASSIUM SERPL-SCNC: 4 MMOL/L (ref 3.5–5.2)
POTASSIUM SERPL-SCNC: 4.3 MMOL/L (ref 3.5–5.2)
PROTHROMBIN TIME: 17.5 SECONDS (ref 12.2–14.5)
PSV: 10 CMH2O
QT INTERVAL: 414 MS
QTC INTERVAL: 459 MS
RBC # BLD AUTO: 2.73 10*6/MM3 (ref 3.77–5.28)
RBC # BLD AUTO: 2.95 10*6/MM3 (ref 3.77–5.28)
RH BLD: POSITIVE
SET MECH RESP RATE: 0
SET MECH RESP RATE: 0
SET MECH RESP RATE: 14
SODIUM SERPL-SCNC: 146 MMOL/L (ref 136–145)
SODIUM SERPL-SCNC: 146 MMOL/L (ref 136–145)
TOTAL RATE: 0 BREATHS/MINUTE
TOTAL RATE: 0 BREATHS/MINUTE
TOTAL RATE: 10 BREATHS/MINUTE
TOTAL RATE: 14 BREATHS/MINUTE
TOTAL RATE: 14 BREATHS/MINUTE
TOTAL RATE: 8 BREATHS/MINUTE
VENTILATOR MODE: ABNORMAL
VT ON VENT VENT: 500 ML
WBC NRBC COR # BLD AUTO: 5.12 10*3/MM3 (ref 3.4–10.8)
WBC NRBC COR # BLD AUTO: 7.82 10*3/MM3 (ref 3.4–10.8)

## 2025-01-28 PROCEDURE — 85027 COMPLETE CBC AUTOMATED: CPT | Performed by: PHYSICIAN ASSISTANT

## 2025-01-28 PROCEDURE — 84132 ASSAY OF SERUM POTASSIUM: CPT

## 2025-01-28 PROCEDURE — 25010000002 FENTANYL CITRATE (PF) 1000 MCG/20ML SOLUTION: Performed by: ANESTHESIOLOGY

## 2025-01-28 PROCEDURE — 85347 COAGULATION TIME ACTIVATED: CPT

## 2025-01-28 PROCEDURE — 99024 POSTOP FOLLOW-UP VISIT: CPT | Performed by: NURSE PRACTITIONER

## 2025-01-28 PROCEDURE — 93010 ELECTROCARDIOGRAM REPORT: CPT | Performed by: INTERNAL MEDICINE

## 2025-01-28 PROCEDURE — 021309W BYPASS CORONARY ARTERY, FOUR OR MORE ARTERIES FROM AORTA WITH AUTOLOGOUS VENOUS TISSUE, OPEN APPROACH: ICD-10-PCS | Performed by: THORACIC SURGERY (CARDIOTHORACIC VASCULAR SURGERY)

## 2025-01-28 PROCEDURE — 82947 ASSAY GLUCOSE BLOOD QUANT: CPT

## 2025-01-28 PROCEDURE — 83735 ASSAY OF MAGNESIUM: CPT | Performed by: PHYSICIAN ASSISTANT

## 2025-01-28 PROCEDURE — 02100Z9 BYPASS CORONARY ARTERY, ONE ARTERY FROM LEFT INTERNAL MAMMARY, OPEN APPROACH: ICD-10-PCS | Performed by: THORACIC SURGERY (CARDIOTHORACIC VASCULAR SURGERY)

## 2025-01-28 PROCEDURE — 80069 RENAL FUNCTION PANEL: CPT | Performed by: PHYSICIAN ASSISTANT

## 2025-01-28 PROCEDURE — 85730 THROMBOPLASTIN TIME PARTIAL: CPT | Performed by: PHYSICIAN ASSISTANT

## 2025-01-28 PROCEDURE — 25010000002 ALBUMIN HUMAN 5% PER 50 ML: Performed by: PHYSICIAN ASSISTANT

## 2025-01-28 PROCEDURE — 25010000002 CEFAZOLIN PER 500 MG: Performed by: ANESTHESIOLOGY

## 2025-01-28 PROCEDURE — 25010000002 PHENYLEPHRINE 10 MG/ML SOLUTION: Performed by: ANESTHESIOLOGY

## 2025-01-28 PROCEDURE — 25010000002 PROTAMINE SULFATE PER 10 MG: Performed by: ANESTHESIOLOGY

## 2025-01-28 PROCEDURE — 33508 ENDOSCOPIC VEIN HARVEST: CPT | Performed by: THORACIC SURGERY (CARDIOTHORACIC VASCULAR SURGERY)

## 2025-01-28 PROCEDURE — 82330 ASSAY OF CALCIUM: CPT | Performed by: PHYSICIAN ASSISTANT

## 2025-01-28 PROCEDURE — 33533 CABG ARTERIAL SINGLE: CPT | Performed by: THORACIC SURGERY (CARDIOTHORACIC VASCULAR SURGERY)

## 2025-01-28 PROCEDURE — A4648 IMPLANTABLE TISSUE MARKER: HCPCS | Performed by: THORACIC SURGERY (CARDIOTHORACIC VASCULAR SURGERY)

## 2025-01-28 PROCEDURE — C1751 CATH, INF, PER/CENT/MIDLINE: HCPCS | Performed by: ANESTHESIOLOGY

## 2025-01-28 PROCEDURE — 25010000002 HEPARIN (PORCINE) PER 1000 UNITS: Performed by: ANESTHESIOLOGY

## 2025-01-28 PROCEDURE — 25010000002 HEPARIN (PORCINE) PER 1000 UNITS: Performed by: THORACIC SURGERY (CARDIOTHORACIC VASCULAR SURGERY)

## 2025-01-28 PROCEDURE — 84295 ASSAY OF SERUM SODIUM: CPT

## 2025-01-28 PROCEDURE — P9041 ALBUMIN (HUMAN),5%, 50ML: HCPCS | Performed by: PHYSICIAN ASSISTANT

## 2025-01-28 PROCEDURE — 94799 UNLISTED PULMONARY SVC/PX: CPT

## 2025-01-28 PROCEDURE — 85014 HEMATOCRIT: CPT

## 2025-01-28 PROCEDURE — 82803 BLOOD GASES ANY COMBINATION: CPT

## 2025-01-28 PROCEDURE — 25010000002 LIDOCAINE PF 1% 1 % SOLUTION: Performed by: THORACIC SURGERY (CARDIOTHORACIC VASCULAR SURGERY)

## 2025-01-28 PROCEDURE — 25010000002 ESMOLOL 100 MG/10ML SOLUTION: Performed by: ANESTHESIOLOGY

## 2025-01-28 PROCEDURE — 85610 PROTHROMBIN TIME: CPT | Performed by: PHYSICIAN ASSISTANT

## 2025-01-28 PROCEDURE — 25010000002 FENTANYL CITRATE (PF) 50 MCG/ML SOLUTION: Performed by: THORACIC SURGERY (CARDIOTHORACIC VASCULAR SURGERY)

## 2025-01-28 PROCEDURE — 82948 REAGENT STRIP/BLOOD GLUCOSE: CPT

## 2025-01-28 PROCEDURE — 33521 CABG ARTERY-VEIN FOUR: CPT | Performed by: THORACIC SURGERY (CARDIOTHORACIC VASCULAR SURGERY)

## 2025-01-28 PROCEDURE — 5A1221Z PERFORMANCE OF CARDIAC OUTPUT, CONTINUOUS: ICD-10-PCS | Performed by: THORACIC SURGERY (CARDIOTHORACIC VASCULAR SURGERY)

## 2025-01-28 PROCEDURE — 93005 ELECTROCARDIOGRAM TRACING: CPT | Performed by: PHYSICIAN ASSISTANT

## 2025-01-28 PROCEDURE — 86900 BLOOD TYPING SEROLOGIC ABO: CPT

## 2025-01-28 PROCEDURE — 25810000003 SODIUM CHLORIDE 0.9 % SOLUTION 250 ML FLEX CONT: Performed by: ANESTHESIOLOGY

## 2025-01-28 PROCEDURE — 25010000002 ACETAMINOPHEN 10 MG/ML SOLUTION: Performed by: PHYSICIAN ASSISTANT

## 2025-01-28 PROCEDURE — 25010000002 CEFAZOLIN PER 500 MG

## 2025-01-28 PROCEDURE — 25810000003 SODIUM CHLORIDE PER 500 ML: Performed by: THORACIC SURGERY (CARDIOTHORACIC VASCULAR SURGERY)

## 2025-01-28 PROCEDURE — 93318 ECHO TRANSESOPHAGEAL INTRAOP: CPT | Performed by: THORACIC SURGERY (CARDIOTHORACIC VASCULAR SURGERY)

## 2025-01-28 PROCEDURE — 82330 ASSAY OF CALCIUM: CPT

## 2025-01-28 PROCEDURE — 06BQ4ZZ EXCISION OF LEFT SAPHENOUS VEIN, PERCUTANEOUS ENDOSCOPIC APPROACH: ICD-10-PCS | Performed by: THORACIC SURGERY (CARDIOTHORACIC VASCULAR SURGERY)

## 2025-01-28 PROCEDURE — 25010000002 GLYCOPYRROLATE 1 MG/5ML SOLUTION: Performed by: ANESTHESIOLOGY

## 2025-01-28 PROCEDURE — 99233 SBSQ HOSP IP/OBS HIGH 50: CPT | Performed by: INTERNAL MEDICINE

## 2025-01-28 PROCEDURE — 71045 X-RAY EXAM CHEST 1 VIEW: CPT

## 2025-01-28 PROCEDURE — 25010000002 PAPAVERINE PER 60 MG: Performed by: THORACIC SURGERY (CARDIOTHORACIC VASCULAR SURGERY)

## 2025-01-28 PROCEDURE — 25010000002 EPINEPHRINE PER 0.1 MG: Performed by: ANESTHESIOLOGY

## 2025-01-28 PROCEDURE — 88307 TISSUE EXAM BY PATHOLOGIST: CPT | Performed by: THORACIC SURGERY (CARDIOTHORACIC VASCULAR SURGERY)

## 2025-01-28 PROCEDURE — 82375 ASSAY CARBOXYHB QUANT: CPT

## 2025-01-28 PROCEDURE — 86901 BLOOD TYPING SEROLOGIC RH(D): CPT

## 2025-01-28 PROCEDURE — 25810000003 LACTATED RINGERS PER 1000 ML: Performed by: ANESTHESIOLOGY

## 2025-01-28 PROCEDURE — 25810000003 LACTATED RINGERS PER 1000 ML: Performed by: THORACIC SURGERY (CARDIOTHORACIC VASCULAR SURGERY)

## 2025-01-28 PROCEDURE — 83050 HGB METHEMOGLOBIN QUAN: CPT

## 2025-01-28 PROCEDURE — 25010000002 MIDAZOLAM PER 1 MG: Performed by: ANESTHESIOLOGY

## 2025-01-28 PROCEDURE — 25010000002 PROPOFOL 1000 MG/ML EMULSION: Performed by: ANESTHESIOLOGY

## 2025-01-28 PROCEDURE — 82805 BLOOD GASES W/O2 SATURATION: CPT

## 2025-01-28 PROCEDURE — 25010000002 LIDOCAINE PF 1% 1 % SOLUTION: Performed by: ANESTHESIOLOGY

## 2025-01-28 PROCEDURE — 25010000002 CEFAZOLIN PER 500 MG: Performed by: PHYSICIAN ASSISTANT

## 2025-01-28 PROCEDURE — 25810000003 DEXTROSE 5 % WITH KCL 20 MEQ 20 MEQ/L SOLUTION: Performed by: PHYSICIAN ASSISTANT

## 2025-01-28 DEVICE — SUT STL 2/0 CV SH 24IN TPW32: Type: IMPLANTABLE DEVICE | Site: CHEST | Status: FUNCTIONAL

## 2025-01-28 DEVICE — LIGACLIP MCA MULTIPLE CLIP APPLIERS, 20 SMALL CLIPS
Type: IMPLANTABLE DEVICE | Site: LEG | Status: FUNCTIONAL
Brand: LIGACLIP

## 2025-01-28 DEVICE — DISK-SHAPED STYLE, SILICONE (1 PER STERILE PKG)
Type: IMPLANTABLE DEVICE | Site: LEG | Status: FUNCTIONAL
Brand: SCANLAN® RADIOMARK® GRAFT MARKERS

## 2025-01-28 RX ORDER — SODIUM CHLORIDE, SODIUM LACTATE, POTASSIUM CHLORIDE, CALCIUM CHLORIDE 600; 310; 30; 20 MG/100ML; MG/100ML; MG/100ML; MG/100ML
INJECTION, SOLUTION INTRAVENOUS CONTINUOUS PRN
Status: DISCONTINUED | OUTPATIENT
Start: 2025-01-28 | End: 2025-01-28 | Stop reason: SURG

## 2025-01-28 RX ORDER — ASPIRIN 325 MG
325 TABLET, DELAYED RELEASE (ENTERIC COATED) ORAL DAILY
Status: DISCONTINUED | OUTPATIENT
Start: 2025-01-29 | End: 2025-01-29

## 2025-01-28 RX ORDER — ACETAMINOPHEN 10 MG/ML
1000 INJECTION, SOLUTION INTRAVENOUS ONCE
Status: COMPLETED | OUTPATIENT
Start: 2025-01-28 | End: 2025-01-28

## 2025-01-28 RX ORDER — NOREPINEPHRINE BITARTRATE 0.03 MG/ML
.02-.3 INJECTION, SOLUTION INTRAVENOUS CONTINUOUS PRN
Status: DISCONTINUED | OUTPATIENT
Start: 2025-01-28 | End: 2025-02-03

## 2025-01-28 RX ORDER — ASPIRIN 325 MG
325 TABLET ORAL ONCE
Status: COMPLETED | OUTPATIENT
Start: 2025-01-28 | End: 2025-01-28

## 2025-01-28 RX ORDER — MIDAZOLAM HYDROCHLORIDE 1 MG/ML
0.5 INJECTION, SOLUTION INTRAMUSCULAR; INTRAVENOUS
Status: DISCONTINUED | OUTPATIENT
Start: 2025-01-28 | End: 2025-01-30

## 2025-01-28 RX ORDER — FAMOTIDINE 20 MG/1
20 TABLET, FILM COATED ORAL ONCE
Status: DISCONTINUED | OUTPATIENT
Start: 2025-01-28 | End: 2025-01-28 | Stop reason: SDUPTHER

## 2025-01-28 RX ORDER — DOBUTAMINE HYDROCHLORIDE 100 MG/100ML
2-20 INJECTION INTRAVENOUS CONTINUOUS PRN
Status: DISCONTINUED | OUTPATIENT
Start: 2025-01-28 | End: 2025-01-30

## 2025-01-28 RX ORDER — ESMOLOL HYDROCHLORIDE 10 MG/ML
INJECTION INTRAVENOUS AS NEEDED
Status: DISCONTINUED | OUTPATIENT
Start: 2025-01-28 | End: 2025-01-28 | Stop reason: SURG

## 2025-01-28 RX ORDER — CHLORHEXIDINE GLUCONATE ORAL RINSE 1.2 MG/ML
15 SOLUTION DENTAL ONCE
Status: COMPLETED | OUTPATIENT
Start: 2025-01-28 | End: 2025-01-28

## 2025-01-28 RX ORDER — DEXTROSE MONOHYDRATE 25 G/50ML
10-50 INJECTION, SOLUTION INTRAVENOUS
Status: DISCONTINUED | OUTPATIENT
Start: 2025-01-28 | End: 2025-01-29

## 2025-01-28 RX ORDER — LIDOCAINE HYDROCHLORIDE 10 MG/ML
0.5 INJECTION, SOLUTION EPIDURAL; INFILTRATION; INTRACAUDAL; PERINEURAL ONCE AS NEEDED
Status: DISCONTINUED | OUTPATIENT
Start: 2025-01-28 | End: 2025-02-04

## 2025-01-28 RX ORDER — ATORVASTATIN CALCIUM 40 MG/1
40 TABLET, FILM COATED ORAL NIGHTLY
Status: DISCONTINUED | OUTPATIENT
Start: 2025-01-28 | End: 2025-01-29

## 2025-01-28 RX ORDER — FENTANYL CITRATE 0.05 MG/ML
INJECTION, SOLUTION INTRAMUSCULAR; INTRAVENOUS AS NEEDED
Status: DISCONTINUED | OUTPATIENT
Start: 2025-01-28 | End: 2025-01-28 | Stop reason: SURG

## 2025-01-28 RX ORDER — LIDOCAINE HYDROCHLORIDE 10 MG/ML
0.5 INJECTION, SOLUTION EPIDURAL; INFILTRATION; INTRACAUDAL; PERINEURAL ONCE
Status: COMPLETED | OUTPATIENT
Start: 2025-01-28 | End: 2025-01-28

## 2025-01-28 RX ORDER — SODIUM CHLORIDE, SODIUM LACTATE, POTASSIUM CHLORIDE, CALCIUM CHLORIDE 600; 310; 30; 20 MG/100ML; MG/100ML; MG/100ML; MG/100ML
9 INJECTION, SOLUTION INTRAVENOUS CONTINUOUS
Status: DISCONTINUED | OUTPATIENT
Start: 2025-01-28 | End: 2025-01-28

## 2025-01-28 RX ORDER — NICOTINE POLACRILEX 4 MG
15 LOZENGE BUCCAL
Status: DISCONTINUED | OUTPATIENT
Start: 2025-01-28 | End: 2025-01-29

## 2025-01-28 RX ORDER — PHENYLEPHRINE HYDROCHLORIDE 10 MG/ML
INJECTION INTRAVENOUS AS NEEDED
Status: DISCONTINUED | OUTPATIENT
Start: 2025-01-28 | End: 2025-01-28 | Stop reason: SURG

## 2025-01-28 RX ORDER — AMOXICILLIN 250 MG
2 CAPSULE ORAL 2 TIMES DAILY
Status: DISCONTINUED | OUTPATIENT
Start: 2025-01-28 | End: 2025-01-29

## 2025-01-28 RX ORDER — HYDROCODONE BITARTRATE AND ACETAMINOPHEN 7.5; 325 MG/1; MG/1
1 TABLET ORAL EVERY 4 HOURS PRN
Status: DISCONTINUED | OUTPATIENT
Start: 2025-01-28 | End: 2025-01-29

## 2025-01-28 RX ORDER — ONDANSETRON 2 MG/ML
4 INJECTION INTRAMUSCULAR; INTRAVENOUS EVERY 6 HOURS PRN
Status: DISCONTINUED | OUTPATIENT
Start: 2025-01-28 | End: 2025-02-05 | Stop reason: HOSPADM

## 2025-01-28 RX ORDER — FAMOTIDINE 20 MG/1
20 TABLET, FILM COATED ORAL ONCE
Status: COMPLETED | OUTPATIENT
Start: 2025-01-28 | End: 2025-01-28

## 2025-01-28 RX ORDER — PANTOPRAZOLE SODIUM 40 MG/1
40 TABLET, DELAYED RELEASE ORAL
Status: DISCONTINUED | OUTPATIENT
Start: 2025-01-29 | End: 2025-01-29

## 2025-01-28 RX ORDER — ACETAMINOPHEN 500 MG
1000 TABLET ORAL EVERY 8 HOURS
Status: DISCONTINUED | OUTPATIENT
Start: 2025-01-28 | End: 2025-01-29

## 2025-01-28 RX ORDER — EPHEDRINE SULFATE 50 MG/ML
INJECTION INTRAVENOUS AS NEEDED
Status: DISCONTINUED | OUTPATIENT
Start: 2025-01-28 | End: 2025-01-28 | Stop reason: SURG

## 2025-01-28 RX ORDER — HEPARIN SODIUM 1000 [USP'U]/ML
INJECTION, SOLUTION INTRAVENOUS; SUBCUTANEOUS AS NEEDED
Status: DISCONTINUED | OUTPATIENT
Start: 2025-01-28 | End: 2025-01-28 | Stop reason: SURG

## 2025-01-28 RX ORDER — PROTAMINE SULFATE 10 MG/ML
50 INJECTION, SOLUTION INTRAVENOUS ONCE
Status: DISCONTINUED | OUTPATIENT
Start: 2025-01-28 | End: 2025-01-30

## 2025-01-28 RX ORDER — METOPROLOL TARTRATE 1 MG/ML
2.5 INJECTION, SOLUTION INTRAVENOUS EVERY 6 HOURS SCHEDULED
Status: ACTIVE | OUTPATIENT
Start: 2025-01-28 | End: 2025-01-29

## 2025-01-28 RX ORDER — AMINOCAPROIC ACID 250 MG/ML
INJECTION, SOLUTION INTRAVENOUS AS NEEDED
Status: DISCONTINUED | OUTPATIENT
Start: 2025-01-28 | End: 2025-01-28 | Stop reason: SURG

## 2025-01-28 RX ORDER — PAPAVERINE HYDROCHLORIDE 30 MG/ML
INJECTION INTRAMUSCULAR; INTRAVENOUS AS NEEDED
Status: DISCONTINUED | OUTPATIENT
Start: 2025-01-28 | End: 2025-01-28 | Stop reason: HOSPADM

## 2025-01-28 RX ORDER — GLYCOPYRROLATE 0.2 MG/ML
INJECTION INTRAMUSCULAR; INTRAVENOUS AS NEEDED
Status: DISCONTINUED | OUTPATIENT
Start: 2025-01-28 | End: 2025-01-28 | Stop reason: SURG

## 2025-01-28 RX ORDER — ROCURONIUM BROMIDE 10 MG/ML
INJECTION, SOLUTION INTRAVENOUS AS NEEDED
Status: DISCONTINUED | OUTPATIENT
Start: 2025-01-28 | End: 2025-01-28 | Stop reason: SURG

## 2025-01-28 RX ORDER — PROTAMINE SULFATE 10 MG/ML
INJECTION, SOLUTION INTRAVENOUS AS NEEDED
Status: DISCONTINUED | OUTPATIENT
Start: 2025-01-28 | End: 2025-01-28 | Stop reason: SURG

## 2025-01-28 RX ORDER — GABAPENTIN 300 MG/1
300 CAPSULE ORAL ONCE
Status: COMPLETED | OUTPATIENT
Start: 2025-01-28 | End: 2025-01-28

## 2025-01-28 RX ORDER — FENTANYL CITRATE 50 UG/ML
25 INJECTION, SOLUTION INTRAMUSCULAR; INTRAVENOUS
Status: DISCONTINUED | OUTPATIENT
Start: 2025-01-28 | End: 2025-01-29

## 2025-01-28 RX ORDER — MORPHINE SULFATE 2 MG/ML
2 INJECTION, SOLUTION INTRAMUSCULAR; INTRAVENOUS
Status: DISCONTINUED | OUTPATIENT
Start: 2025-01-28 | End: 2025-01-30

## 2025-01-28 RX ORDER — LIDOCAINE HYDROCHLORIDE 10 MG/ML
INJECTION, SOLUTION EPIDURAL; INFILTRATION; INTRACAUDAL; PERINEURAL AS NEEDED
Status: DISCONTINUED | OUTPATIENT
Start: 2025-01-28 | End: 2025-01-28 | Stop reason: SURG

## 2025-01-28 RX ORDER — IBUPROFEN 600 MG/1
1 TABLET ORAL
Status: DISCONTINUED | OUTPATIENT
Start: 2025-01-28 | End: 2025-01-29

## 2025-01-28 RX ORDER — MIDAZOLAM HYDROCHLORIDE 1 MG/ML
INJECTION, SOLUTION INTRAMUSCULAR; INTRAVENOUS AS NEEDED
Status: DISCONTINUED | OUTPATIENT
Start: 2025-01-28 | End: 2025-01-28 | Stop reason: SURG

## 2025-01-28 RX ORDER — DOPAMINE HYDROCHLORIDE 160 MG/100ML
2-20 INJECTION, SOLUTION INTRAVENOUS CONTINUOUS PRN
Status: DISCONTINUED | OUTPATIENT
Start: 2025-01-28 | End: 2025-01-30

## 2025-01-28 RX ORDER — OXYCODONE HYDROCHLORIDE 10 MG/1
10 TABLET ORAL EVERY 4 HOURS PRN
Status: DISCONTINUED | OUTPATIENT
Start: 2025-01-28 | End: 2025-01-29

## 2025-01-28 RX ORDER — FAMOTIDINE 10 MG/ML
20 INJECTION, SOLUTION INTRAVENOUS ONCE
Status: DISCONTINUED | OUTPATIENT
Start: 2025-01-28 | End: 2025-01-28 | Stop reason: SDUPTHER

## 2025-01-28 RX ORDER — SODIUM CHLORIDE 9 MG/ML
INJECTION, SOLUTION INTRAVENOUS AS NEEDED
Status: DISCONTINUED | OUTPATIENT
Start: 2025-01-28 | End: 2025-01-28 | Stop reason: HOSPADM

## 2025-01-28 RX ORDER — SODIUM CHLORIDE 0.9 % (FLUSH) 0.9 %
10 SYRINGE (ML) INJECTION AS NEEDED
Status: DISCONTINUED | OUTPATIENT
Start: 2025-01-28 | End: 2025-02-05 | Stop reason: HOSPADM

## 2025-01-28 RX ORDER — ETOMIDATE 2 MG/ML
INJECTION INTRAVENOUS AS NEEDED
Status: DISCONTINUED | OUTPATIENT
Start: 2025-01-28 | End: 2025-01-28 | Stop reason: SURG

## 2025-01-28 RX ORDER — DEXMEDETOMIDINE HYDROCHLORIDE 4 UG/ML
.2-1.5 INJECTION, SOLUTION INTRAVENOUS CONTINUOUS PRN
Status: DISCONTINUED | OUTPATIENT
Start: 2025-01-28 | End: 2025-01-30

## 2025-01-28 RX ORDER — CHLORHEXIDINE GLUCONATE ORAL RINSE 1.2 MG/ML
15 SOLUTION DENTAL EVERY 12 HOURS SCHEDULED
Status: DISCONTINUED | OUTPATIENT
Start: 2025-01-28 | End: 2025-01-30

## 2025-01-28 RX ORDER — POTASSIUM CHLORIDE, DEXTROSE MONOHYDRATE 150; 5 MG/100ML; G/100ML
30 INJECTION, SOLUTION INTRAVENOUS CONTINUOUS
Status: ACTIVE | OUTPATIENT
Start: 2025-01-28 | End: 2025-01-29

## 2025-01-28 RX ORDER — CHLORHEXIDINE GLUCONATE 500 MG/1
CLOTH TOPICAL EVERY 12 HOURS PRN
Status: DISCONTINUED | OUTPATIENT
Start: 2025-01-28 | End: 2025-01-28

## 2025-01-28 RX ORDER — ALBUTEROL SULFATE 0.83 MG/ML
2.5 SOLUTION RESPIRATORY (INHALATION) EVERY 4 HOURS PRN
Status: ACTIVE | OUTPATIENT
Start: 2025-01-28 | End: 2025-01-29

## 2025-01-28 RX ORDER — MAGNESIUM HYDROXIDE 1200 MG/15ML
LIQUID ORAL AS NEEDED
Status: DISCONTINUED | OUTPATIENT
Start: 2025-01-28 | End: 2025-01-28 | Stop reason: HOSPADM

## 2025-01-28 RX ORDER — GABAPENTIN 100 MG/1
100 CAPSULE ORAL 3 TIMES DAILY
Status: DISCONTINUED | OUTPATIENT
Start: 2025-01-28 | End: 2025-01-29

## 2025-01-28 RX ORDER — SODIUM CHLORIDE, SODIUM LACTATE, POTASSIUM CHLORIDE, CALCIUM CHLORIDE 600; 310; 30; 20 MG/100ML; MG/100ML; MG/100ML; MG/100ML
9 INJECTION, SOLUTION INTRAVENOUS CONTINUOUS
Status: DISCONTINUED | OUTPATIENT
Start: 2025-01-29 | End: 2025-01-28

## 2025-01-28 RX ORDER — NITROGLYCERIN 0.4 MG/1
0.4 TABLET SUBLINGUAL
Status: DISCONTINUED | OUTPATIENT
Start: 2025-01-28 | End: 2025-01-28

## 2025-01-28 RX ORDER — ALBUMIN HUMAN 50 G/1000ML
250 SOLUTION INTRAVENOUS AS NEEDED
Status: COMPLETED | OUTPATIENT
Start: 2025-01-28 | End: 2025-01-29

## 2025-01-28 RX ORDER — CEFAZOLIN SODIUM 1 G/3ML
INJECTION, POWDER, FOR SOLUTION INTRAMUSCULAR; INTRAVENOUS AS NEEDED
Status: DISCONTINUED | OUTPATIENT
Start: 2025-01-28 | End: 2025-01-28 | Stop reason: SURG

## 2025-01-28 RX ORDER — NALOXONE HYDROCHLORIDE 0.4 MG/ML
0.2 INJECTION, SOLUTION INTRAMUSCULAR; INTRAVENOUS; SUBCUTANEOUS AS NEEDED
Status: DISCONTINUED | OUTPATIENT
Start: 2025-01-28 | End: 2025-02-05 | Stop reason: HOSPADM

## 2025-01-28 RX ORDER — VECURONIUM BROMIDE 1 MG/ML
INJECTION, POWDER, FOR SOLUTION INTRAVENOUS AS NEEDED
Status: DISCONTINUED | OUTPATIENT
Start: 2025-01-28 | End: 2025-01-28 | Stop reason: SURG

## 2025-01-28 RX ORDER — SODIUM CHLORIDE 0.9 % (FLUSH) 0.9 %
10 SYRINGE (ML) INJECTION EVERY 12 HOURS SCHEDULED
Status: DISCONTINUED | OUTPATIENT
Start: 2025-01-28 | End: 2025-02-03

## 2025-01-28 RX ADMIN — MIDAZOLAM HYDROCHLORIDE 1 MG: 1 INJECTION, SOLUTION INTRAMUSCULAR; INTRAVENOUS at 07:09

## 2025-01-28 RX ADMIN — FENTANYL CITRATE 250 MCG: 0.05 INJECTION, SOLUTION INTRAMUSCULAR; INTRAVENOUS at 09:30

## 2025-01-28 RX ADMIN — HEPARIN SODIUM 25000 UNITS: 1000 INJECTION INTRAVENOUS; SUBCUTANEOUS at 09:09

## 2025-01-28 RX ADMIN — OXYCODONE HYDROCHLORIDE 10 MG: 10 TABLET ORAL at 21:25

## 2025-01-28 RX ADMIN — GABAPENTIN 100 MG: 100 CAPSULE ORAL at 16:29

## 2025-01-28 RX ADMIN — Medication 10 ML: at 20:03

## 2025-01-28 RX ADMIN — INSULIN HUMAN 1.5 UNITS/HR: 1 INJECTION, SOLUTION INTRAVENOUS at 07:41

## 2025-01-28 RX ADMIN — SODIUM CHLORIDE 2 G: 900 INJECTION INTRAVENOUS at 07:40

## 2025-01-28 RX ADMIN — FENTANYL CITRATE 150 MCG: 0.05 INJECTION, SOLUTION INTRAMUSCULAR; INTRAVENOUS at 08:59

## 2025-01-28 RX ADMIN — ASPIRIN 325 MG: 325 TABLET ORAL at 13:41

## 2025-01-28 RX ADMIN — FENTANYL CITRATE 250 MCG: 0.05 INJECTION, SOLUTION INTRAMUSCULAR; INTRAVENOUS at 08:24

## 2025-01-28 RX ADMIN — FAMOTIDINE 20 MG: 20 TABLET, FILM COATED ORAL at 06:07

## 2025-01-28 RX ADMIN — CHLORHEXIDINE GLUCONATE 15 ML: 1.2 SOLUTION ORAL at 06:07

## 2025-01-28 RX ADMIN — ETOMIDATE INJECTION 15 MG: 2 SOLUTION INTRAVENOUS at 07:20

## 2025-01-28 RX ADMIN — ALBUMIN (HUMAN) 250 ML: 12.5 INJECTION, SOLUTION INTRAVENOUS at 14:17

## 2025-01-28 RX ADMIN — MIDAZOLAM HYDROCHLORIDE 1 MG: 1 INJECTION, SOLUTION INTRAMUSCULAR; INTRAVENOUS at 07:13

## 2025-01-28 RX ADMIN — PROPOFOL 20 MCG/KG/MIN: 10 INJECTION, EMULSION INTRAVENOUS at 12:28

## 2025-01-28 RX ADMIN — FENTANYL CITRATE 50 MCG: 0.05 INJECTION, SOLUTION INTRAMUSCULAR; INTRAVENOUS at 07:18

## 2025-01-28 RX ADMIN — PHENYLEPHRINE HYDROCHLORIDE 100 MCG: 10 INJECTION INTRAVENOUS at 07:56

## 2025-01-28 RX ADMIN — PROTAMINE SULFATE 300 MG: 10 INJECTION, SOLUTION INTRAVENOUS at 11:52

## 2025-01-28 RX ADMIN — FENTANYL CITRATE 25 MCG: 50 INJECTION, SOLUTION INTRAMUSCULAR; INTRAVENOUS at 21:50

## 2025-01-28 RX ADMIN — LIDOCAINE HYDROCHLORIDE 50 MG: 10 INJECTION, SOLUTION EPIDURAL; INFILTRATION; INTRACAUDAL; PERINEURAL at 07:20

## 2025-01-28 RX ADMIN — PHENYLEPHRINE HYDROCHLORIDE 100 MCG: 10 INJECTION INTRAVENOUS at 07:37

## 2025-01-28 RX ADMIN — FENTANYL CITRATE 50 MCG: 0.05 INJECTION, SOLUTION INTRAMUSCULAR; INTRAVENOUS at 07:14

## 2025-01-28 RX ADMIN — AMINOCAPROIC ACID 10 G: 250 INJECTION, SOLUTION INTRAVENOUS at 07:39

## 2025-01-28 RX ADMIN — GABAPENTIN 300 MG: 300 CAPSULE ORAL at 06:07

## 2025-01-28 RX ADMIN — CEFAZOLIN 2 G: 1 INJECTION, POWDER, FOR SOLUTION INTRAMUSCULAR; INTRAVENOUS at 11:52

## 2025-01-28 RX ADMIN — PHENYLEPHRINE HYDROCHLORIDE 100 MCG: 10 INJECTION INTRAVENOUS at 08:51

## 2025-01-28 RX ADMIN — EPHEDRINE SULFATE 10 MG: 50 INJECTION INTRAVENOUS at 11:44

## 2025-01-28 RX ADMIN — MUPIROCIN 1 APPLICATION: 20 OINTMENT TOPICAL at 18:02

## 2025-01-28 RX ADMIN — ALBUMIN (HUMAN) 250 ML: 12.5 INJECTION, SOLUTION INTRAVENOUS at 13:49

## 2025-01-28 RX ADMIN — CHLORHEXIDINE GLUCONATE 15 ML: 1.2 SOLUTION ORAL at 20:05

## 2025-01-28 RX ADMIN — ESMOLOL HYDROCHLORIDE 50 MG: 10 INJECTION, SOLUTION INTRAVENOUS at 07:25

## 2025-01-28 RX ADMIN — ROCURONIUM BROMIDE 100 MG: 10 INJECTION INTRAVENOUS at 07:20

## 2025-01-28 RX ADMIN — EPINEPHRINE 0.05 MCG/KG/MIN: 1 INJECTION, SOLUTION, CONCENTRATE INTRAVENOUS at 11:42

## 2025-01-28 RX ADMIN — ACETAMINOPHEN 1000 MG: 10 INJECTION INTRAVENOUS at 13:40

## 2025-01-28 RX ADMIN — GLYCOPYRROLATE 0.6 MCG: 0.2 INJECTION, SOLUTION INTRAMUSCULAR; INTRAVENOUS at 11:30

## 2025-01-28 RX ADMIN — POTASSIUM CHLORIDE AND DEXTROSE MONOHYDRATE 30 ML/HR: 150; 5 INJECTION, SOLUTION INTRAVENOUS at 13:35

## 2025-01-28 RX ADMIN — SODIUM CHLORIDE 2000 MG: 900 INJECTION INTRAVENOUS at 18:04

## 2025-01-28 RX ADMIN — MUPIROCIN 1 APPLICATION: 20 OINTMENT TOPICAL at 06:07

## 2025-01-28 RX ADMIN — LIDOCAINE HYDROCHLORIDE 0.5 ML: 10 INJECTION, SOLUTION EPIDURAL; INFILTRATION; INTRACAUDAL; PERINEURAL at 06:07

## 2025-01-28 RX ADMIN — ACETAMINOPHEN 1000 MG: 500 TABLET ORAL at 23:11

## 2025-01-28 RX ADMIN — FENTANYL CITRATE 250 MCG: 0.05 INJECTION, SOLUTION INTRAMUSCULAR; INTRAVENOUS at 10:55

## 2025-01-28 RX ADMIN — VECURONIUM BROMIDE 10 MG: 20 INJECTION, POWDER, LYOPHILIZED, FOR SOLUTION INTRAVENOUS at 09:30

## 2025-01-28 RX ADMIN — SODIUM CHLORIDE, POTASSIUM CHLORIDE, SODIUM LACTATE AND CALCIUM CHLORIDE: 600; 310; 30; 20 INJECTION, SOLUTION INTRAVENOUS at 07:04

## 2025-01-28 RX ADMIN — PHENYLEPHRINE HYDROCHLORIDE 100 MCG: 10 INJECTION INTRAVENOUS at 09:13

## 2025-01-28 RX ADMIN — SODIUM CHLORIDE, SODIUM LACTATE, POTASSIUM CHLORIDE, CALCIUM CHLORIDE 9 ML/HR: 20; 30; 600; 310 INJECTION, SOLUTION INTRAVENOUS at 06:07

## 2025-01-28 RX ADMIN — AMINOCAPROIC ACID 10 G: 250 INJECTION, SOLUTION INTRAVENOUS at 11:52

## 2025-01-28 RX ADMIN — ATORVASTATIN CALCIUM 40 MG: 40 TABLET, FILM COATED ORAL at 20:03

## 2025-01-28 RX ADMIN — ALBUMIN (HUMAN) 250 ML: 12.5 INJECTION, SOLUTION INTRAVENOUS at 15:17

## 2025-01-28 RX ADMIN — GABAPENTIN 100 MG: 100 CAPSULE ORAL at 20:03

## 2025-01-28 RX ADMIN — CHLORHEXIDINE GLUCONATE 15 ML: 1.2 SOLUTION ORAL at 13:41

## 2025-01-28 RX ADMIN — SENNOSIDES AND DOCUSATE SODIUM 2 TABLET: 50; 8.6 TABLET ORAL at 20:03

## 2025-01-28 NOTE — ANESTHESIA PROCEDURE NOTES
Central Line    Pre-sedation assessment completed: 1/28/2025 7:22 AM    Patient reassessed immediately prior to procedure    Patient location during procedure: OR  Start time: 1/28/2025 7:22 AM  Stop Time:1/28/2025 7:38 AM  Indications: vascular access  Preanesthetic Checklist  Completed: patient identified, IV checked, site marked, risks and benefits discussed, surgical consent, monitors and equipment checked, pre-op evaluation and timeout performed  Central Line Prep  Sterile Tech:cap, gloves, gown, mask and sterile barriers  Prep: chloraprep  Patient monitoring: blood pressure monitoring, continuous pulse oximetry and EKG  Central Line Procedure  Laterality:right  Location:internal jugular  Catheter Type:Cordis  Catheter Size:9 Fr  Guidance:ultrasound guided  PROCEDURE NOTE/ULTRASOUND INTERPRETATION.  Using ultrasound guidance the potential vascular sites for insertion of the catheter were visualized to determine the patency of the vessel to be used for vascular access.  After selecting the appropriate site for insertion, the needle was visualized under ultrasound being inserted into the internal jugular vein, followed by ultrasound confirmation of wire and catheter placement. There were no abnormalities seen on ultrasound; an image was taken; and the patient tolerated the procedure with no complications. Images: still images obtained, printed/placed on chart  Assessment  Post procedure:biopatch applied, line sutured, occlusive dressing applied and secured with tape  Assessement:blood return through all ports, free fluid flow, chest x-ray ordered and Florentino Test  Complications:no  Patient Tolerance:patient tolerated the procedure well with no apparent complications

## 2025-01-28 NOTE — ANESTHESIA PROCEDURE NOTES
Arterial Line      Patient reassessed immediately prior to procedure    Patient location during procedure: pre-op  Start time: 1/28/2025 7:10 AM  Stop Time:1/28/2025 7:15 AM       Line placed for hemodynamic monitoring.  Preanesthetic Checklist  Completed: patient identified, IV checked, site marked, risks and benefits discussed, surgical consent, monitors and equipment checked, pre-op evaluation and timeout performed  Arterial Line Prep    Sterile Tech: cap, gloves and sterile barriers  Prep: ChloraPrep  Patient monitoring: blood pressure monitoring, continuous pulse oximetry and EKG  Arterial Line Procedure   Laterality:right  Location:  femoral artery  Catheter size: 20 G   Guidance: palpation technique  Number of attempts: 1  Successful placement: yes   Post Assessment   Dressing Type: line sutured, occlusive dressing applied, secured with tape and wrist guard applied.   Complications no  Circ/Move/Sens Assessment: normal and unchanged.   Patient Tolerance: patient tolerated the procedure well with no apparent complications

## 2025-01-28 NOTE — ANESTHESIA POSTPROCEDURE EVALUATION
Patient: Abena Washington    Procedure Summary       Date: 01/28/25 Room / Location:  [facility] [address] [place] [address] [facility]    Anesthesia Start: 0704 Anesthesia Stop: 1255    Procedures:       MEDIAN STERNOTOMY, CORONARY ARTERY BYPASS GRAFTING X 5,  UTILIZING THE LEFT INTERNAL MAMMARY ARTERY, ENDOSCOPIC VEIN HARVESTING OF THE LEFT SAPHENOUS VEIN (Chest)      TRANSESOPHAGEAL ECHOCARDIOGRAM WITH ANESTHESIA (Chest) Diagnosis:       Coronary artery disease involving native coronary artery of native heart, unspecified whether angina present      (Coronary artery disease involving native coronary artery of native heart, unspecified whether angina present [I25.10])    Surgeons: Zeferino Del Castillo MD Provider: Shun Fuentes MD    Anesthesia Type: general ASA Status: 4            Anesthesia Type: general    Vitals  No vitals data found for the desired time range.          Post Anesthesia Care and Evaluation    Patient location during evaluation: ICU  Patient participation: complete - patient cannot participate  Level of consciousness: obtunded/minimal responses  Pain score: 0  Pain management: adequate    Airway patency: patent  Anesthetic complications: No anesthetic complications  PONV Status: none  Cardiovascular status: acceptable  Respiratory status: acceptable, intubated and ventilator  Hydration status: acceptable

## 2025-01-28 NOTE — CONSULTS
Abena Washington  [id number]  1945   LOS: 0 days   Patient Care Team:  Eddi Cheatham MD as PCP - General (Internal Medicine)    Ms. Washington is a 79-year-old  white female from [place], [place].    Chief Complaint: Coronary artery disease, hypertension    Problem List:  Coronary artery disease  Left heart catheterization at [facility] 10/30/2024: Severe three-vessel disease including 30-40% LM, 100%  of LAD, 70% diagonal branch, 70% left circumflex, 70% stenosis OM1, 95% RCA stenosis  IntraOp EMRE 1/28/2025: LVEF 38%, apex of LV akinetic/dyskinetic, no significant valvular dysfunction  CABG x 5 vessels (LIMA to LAD, GSV to PDA, GSV to OM1, GSV to OM 3, GSV to D1) on 1/28/2025  Hypertension  Hyperlipidemia  Type 2 diabetes mellitus; hemoglobin A1c 8.3% January 2025  Strong family history of CAD  History of melanoma right upper scapula  Surgical history:  Bilateral cataracts with lens implant  Cholecystectomy  Hysterectomy  Skin cancer excision  Tubal ligation  CABG x 5 vessels    No Known Allergies  Medications Prior to Admission   Medication Sig Dispense Refill Last Dose/Taking    alendronate (FOSAMAX) 70 MG tablet Take 1 tablet by mouth Every 7 (Seven) Days.   Past Week    amLODIPine (NORVASC) 10 MG tablet Take 1 tablet by mouth Daily.   1/27/2025    aspirin 81 MG EC tablet Take 1 tablet by mouth Daily.   1/27/2025 at 10:30 PM    atorvastatin (LIPITOR) 40 MG tablet 1 tablet Daily.   1/27/2025    carvedilol (COREG) 12.5 MG tablet Take 1 tablet by mouth 2 (Two) Times a Day With Meals.   1/27/2025    empagliflozin (JARDIANCE) 10 MG tablet tablet Take  by mouth Daily.   1/27/2025    fluticasone (FLONASE) 50 MCG/ACT nasal spray Administer 2 sprays into the nostril(s) as directed by provider Daily.   1/27/2025    gabapentin (NEURONTIN) 300 MG capsule Take 1 capsule by mouth 2 (Two) Times a Day As Needed.   1/27/2025    glipizide (GLUCOTROL) 10 MG tablet Take 2 tablets by mouth 2 (Two) Times a Day  Before Meals.   1/27/2025    hydroCHLOROthiazide (HYDRODIURIL) 25 MG tablet Take 1 tablet by mouth Daily.   1/27/2025    metFORMIN (GLUCOPHAGE) 1000 MG tablet Take 1 tablet by mouth 2 (Two) Times a Day With Meals.   1/27/2025    multivitamin with minerals (Oncovite) tablet tablet Take 0.5 tablets by mouth Daily.   1/27/2025    pioglitazone (ACTOS) 45 MG tablet Take 1 tablet by mouth Daily As Needed.   1/27/2025    potassium chloride (K-DUR,KLOR-CON) 20 MEQ CR tablet Take 1 tablet by mouth 2 (Two) Times a Day.   1/27/2025    sacubitril-valsartan (Entresto) 49-51 MG tablet Take 1 tablet by mouth 2 (Two) Times a Day.   1/27/2025    SITagliptin (JANUVIA) 100 MG tablet Take 1 tablet by mouth Daily.   1/27/2025    naproxen (NAPROSYN) 500 MG tablet Take 1 tablet by mouth 2 (Two) Times a Day As Needed for Mild Pain.   More than a month     Scheduled Meds:acetaminophen, 1,000 mg, Intravenous, Once  acetaminophen, 1,000 mg, Oral, Q8H  [START ON 1/29/2025] aspirin, 325 mg, Oral, Daily  aspirin, 325 mg, Oral, Once  atorvastatin, 40 mg, Oral, Nightly  ceFAZolin, 2,000 mg, Intravenous, Q8H  chlorhexidine, 15 mL, Mouth/Throat, Q12H  gabapentin, 100 mg, Oral, TID  [START ON 1/29/2025] metoprolol tartrate, 12.5 mg, Oral, Q12H  metoprolol tartrate, 2.5 mg, Intravenous, Q6H  mupirocin, 1 Application, Each Nare, BID  pharmacy consult - MTM, , Not Applicable, Daily  protamine, 50 mg, Intravenous, Once  senna-docusate sodium, 2 tablet, Oral, BID  sodium chloride, 10 mL, Intravenous, Q12H      Continuous Infusions:dexmedetomidine, 0.2-1.5 mcg/kg/hr  dextrose 5 % with KCl 20 mEq, 30 mL/hr  DOBUTamine, 2-20 mcg/kg/min  DOPamine, 2-20 mcg/kg/min  EPINEPHrine, 0.02-0.3 mcg/kg/min  EPINEPHrine, 0.02-0.3 mcg/kg/min  insulin, 0-100 Units/hr  niCARdipine, 5-15 mg/hr  niCARdipine, 5-15 mg/hr  norepinephrine, 0.02-0.3 mcg/kg/min  phenylephrine, 0.5-3 mcg/kg/min  propofol, 5-50 mcg/kg/min  propofol, 5-50 mcg/kg/min      PRN Meds:.  albumin human     albuterol    Calcium Replacement - Follow Nurse / BPA Driven Protocol    dexmedetomidine    dextrose    dextrose    DOBUTamine    DOPamine    EPINEPHrine    fentaNYL citrate (PF)    glucagon (human recombinant)    HYDROcodone-acetaminophen    lidocaine PF 1%    Magnesium Cardiology Dose Replacement - Follow Nurse / BPA Driven Protocol    midazolam    Morphine    naloxone    niCARdipine    norepinephrine    ondansetron    oxyCODONE    phenylephrine    Phosphorus Replacement - Follow Nurse / BPA Driven Protocol    Potassium Replacement - Follow Nurse / BPA Driven Protocol    propofol    racemic epinephrine    sodium bicarbonate    sodium chloride       History of Present Illness:   This is a 79-year-old white female who is status post CABG x 5 vessels with Dr. Del Castillo on 1/28/2025.  Prior to surgery patient had been having frequent episodes of chest pressure per epic records and had strong family history of CAD with her son having a CABG about 8 years ago.  Patient is currently sedated and intubated so no ROS is able to be obtained.  Per RN, there were no intraoperative complications.  Patient did not receive any blood products.    Cardiac risk factors: advanced age (older than 55 for men, 65 for women), diabetes mellitus, dyslipidemia, family history of premature cardiovascular disease, hypertension, and sedentary lifestyle.    Social History     Socioeconomic History    Marital status:    Tobacco Use    Smoking status: Never    Smokeless tobacco: Never   Vaping Use    Vaping status: Never Used   Substance and Sexual Activity    Alcohol use: Never    Drug use: Never    Sexual activity: Defer     Family History   Problem Relation Age of Onset    Diabetes Mother     Dementia Mother     Coronary artery disease Father     Stroke Son     Coronary artery disease Son        Review of Systems  10 point review of systems was completed, positives outlined in the HPI, and otherwise all other systems are negative.       Objective:       Physical Exam  /64 (BP Location: Left arm, Patient Position: Lying)   Pulse 66   Temp 98 °F (36.7 °C) (Temporal)   Resp 16   SpO2 98%   There were no vitals filed for this visit.  There is no height or weight on file to calculate BMI.    Intake/Output Summary (Last 24 hours) at 1/28/2025 1315  Last data filed at 1/28/2025 1258  Gross per 24 hour   Intake 100 ml   Output 1990 ml   Net -1890 ml       General Appearance:  Intubated and sedated, no distress, appears stated age, RIJ CVL, right femoral art line, NG, [place], pacer wires   Head:  Normocephalic, without obvious abnormality, atraumatic   Neck: Supple, symmetrical, trachea midline, no adenopathy, thyroid: not enlarged, symmetric, no tenderness/mass/nodules, no carotid bruit or JVD   Lungs:   Decreased breath sounds to auscultation bilaterally, respirations unlabored, chest tubes in place   Heart:  Regular rate and rhythm, S1, S2 normal, no murmur, rub or gallop   Abdomen:   Soft, nontender, no masses, no organomegaly, bowel sounds audible x4   Extremities: No edema, normal range of motion, SCDs in place   Pulses: 1+ and symmetric   Skin: Sternal incision CDI   Neurologic: Intubated and sedated       Cardiographics  EKG 1/28/2025:  Normal sinus rhythm  Septal infarct , age undetermined  ST & T wave abnormality, consider lateral ischemia  Abnormal ECG  When compared with ECG of 27-Jan-2025 13:34,  QRS axis shifted left  Inverted T waves have replaced nonspecific T wave abnormality in Lateral  leads  QT has lengthened    IntraOp EMRE 1/28/2025:  Echocardiogram Comments:       Moderately impaired lv function  EF estimated at 38%  [place] of LV is akinetic/dyskinetic  Nl valvular function  Images recorded of RAD[facility]    Carotid duplex 1/16/2025:  1. Normal carotid artery velocities. No significant carotid artery stenosis (0-49% stenosis).   2. Antegrade bilateral vertebral artery flow.     Imaging  Chest x-ray 1/28/2025 pending:    Lab Review    Results from last 7 days   Lab Units 01/27/25  1310   SODIUM mmol/L 139   POTASSIUM mmol/L 4.2   CHLORIDE mmol/L 98   CO2 mmol/L 27.0   BUN mg/dL 25*   CREATININE mg/dL 1.12*   GLUCOSE mg/dL 102*   CALCIUM mg/dL 9.7     Results from last 7 days   Lab Units 01/27/25  1310   WBC 10*3/mm3 8.12   HEMOGLOBIN g/dL 13.6   HEMATOCRIT % 42.9   PLATELETS 10*3/mm3 282         Results from last 7 days   Lab Units 01/27/25  1310   HEMOGLOBIN A1C % 8.30*   Drips=  Propofol at 50 mcg/kg/min  Insulin and 0.7 units/h  Dextrose 5% with KCl 20 mEq/L at 30 mL/h          Assessment:   Patient is status post CABG x 5 vessels (LIMA to LAD, GSV to PDA, GSV to OM1, GSV to OM 3, GSV to D1) , POD #0.  Hemodynamically stable.  Patient not on pressors.          Plan:   Continue ICU monitoring/support  Dr. Arboleda to resume care in the morning    Electronically signed by RONA Farmer, 01/28/25, 2:09 PM EST.

## 2025-01-28 NOTE — NURSING NOTE
"Family back for immediate post-op visit. Multiple family members present including 5 adult children.  Discussed pt status and recovery process.  Explained ICU visitation policy. Emergency contact info verified.  \"Vero\" given unit phone number.  "

## 2025-01-28 NOTE — PROGRESS NOTES
PULMONARY NOTE     Hospital Day: 0    Ms. Abena Washington, 79 y.o. female is followed for:   Postoperative mechanical ventilation, hemodynamic, electrolyte monitoring        SUBJECTIVE     Ms. Washington is a 79 year old female with a pmhx of diabetes, hypertension, CAD, and HLD who was admitted on 1/28 for CABG. She was seen by cardiology on 10/30/24 for complaints of intermittent/exertional chest pressure. Echo showed an Ef of 45-50%, no sig VHD. She underwent stress testing which was abnormal and prompted a LHC. LHC revealed MVCAD. Patient was therefore evaluated by CTS and felt to be a reasonable candidate. Patient underwent CABG on 1/28. Post-operatively, patient was transferred to the ICU for higher level of care.   Time spent: 4 minutes. Electronically signed by RONA Adkins, 01/28/25, 8:16 AM EST.      The patient's relevant past medical, surgical and social history were reviewed and updated in Epic as appropriate.        OBJECTIVE     Vital Sign Min/Max for last 24 hours  Temp  Min: 98 °F (36.7 °C)  Max: 98 °F (36.7 °C)   BP  Min: 115/60  Max: 128/64   Pulse  Min: 66  Max: 66   Resp  Min: 16  Max: 16   SpO2  Min: 98 %  Max: 98 %   No data recorded   No data recorded      Intake/Output Summary (Last 24 hours) at 1/28/2025 1428  Last data filed at 1/28/2025 1258  Gross per 24 hour   Intake 100 ml   Output 1990 ml   Net -1890 ml        There is no height or weight on file to calculate BMI. There were no vitals filed for this visit.    dexmedetomidine, 0.2-1.5 mcg/kg/hr  dextrose 5 % with KCl 20 mEq, 30 mL/hr, Last Rate: 30 mL/hr (01/28/25 1335)  DOBUTamine, 2-20 mcg/kg/min  DOPamine, 2-20 mcg/kg/min  EPINEPHrine, 0.02-0.3 mcg/kg/min  EPINEPHrine, 0.02-0.3 mcg/kg/min, Last Rate: 0.01 mcg/kg/min (01/28/25 1422)  insulin, 0-100 Units/hr, Last Rate: 0.5 Units/hr (01/28/25 1411)  niCARdipine, 5-15 mg/hr  niCARdipine, 5-15 mg/hr  norepinephrine, 0.02-0.3 mcg/kg/min  phenylephrine, 0.5-3  mcg/kg/min  propofol, 5-50 mcg/kg/min  propofol, 5-50 mcg/kg/min, Last Rate: 50 mcg/kg/min (01/28/25 1330)         Objective:  Vital signs: (most recent): Blood pressure 128/64, pulse 66, temperature 98 °F (36.7 °C), temperature source Temporal, resp. rate 16, SpO2 98%.            General Appearance:  Not in distress, no asynchrony with mechanical ventilator.  Head:  Pupils reactive & symmetrical B/L.  Neck:   Endotracheal tube clean.   Lungs:   B/L Breath sounds present with decreased breath sounds on bases, no wheezing heard, no crackles.  Chest tubes in place and draining serosanguineous fluid  Heart: S1 and S2 present, no murmur, significant rub noted.   Abdomen: Soft, nontender, no guarding or rigidity, bowel sounds hypoactive.  Extremities: Dressing intact left lower extremity,  no edema, warm to touch.  Neurologic:  Pt sedated on the vent. Not able to participate in full neurological exam    Ventilator settings: SIMV mode.                I reviewed the patient's results and images, including reviewing images and reports.    Medications reviewed.      Scheduled Meds:acetaminophen, 1,000 mg, Oral, Q8H  [START ON 1/29/2025] aspirin, 325 mg, Oral, Daily  atorvastatin, 40 mg, Oral, Nightly  ceFAZolin, 2,000 mg, Intravenous, Q8H  chlorhexidine, 15 mL, Mouth/Throat, Q12H  gabapentin, 100 mg, Oral, TID  [START ON 1/29/2025] metoprolol tartrate, 12.5 mg, Oral, Q12H  metoprolol tartrate, 2.5 mg, Intravenous, Q6H  mupirocin, 1 Application, Each Nare, BID  pharmacy consult - MTM, , Not Applicable, Daily  protamine, 50 mg, Intravenous, Once  senna-docusate sodium, 2 tablet, Oral, BID  sodium chloride, 10 mL, Intravenous, Q12H          Assessment & Plan   ASSESSMENT      Active Hospital Problems    Diagnosis     **CAD s/p CABG x 5 1/28/2025     CAD (coronary artery disease)            RECOMMENDATIONS   79-year-old female with diabetes, hypertension, coronary disease, dyslipidemia recently seen by cardiology team with  complaints of intermittent/exertional chest pressure.  Echo showed EF of 45 to 50%.  Underwent stress testing which was abnormal and underwent subsequently left heart cath which showed multivessel coronary disease.  Patient was referred to CT surgery for surgical revascularization.  Patient underwent preop workup which included bilateral carotid duplex which did not show any significant carotid disease.  Spirometry was done but results are not available.  Patient was deemed surgical candidate so underwent coronary bypass graft surgery x 5 by Dr. Del Castillo on 1/28.  Estimated blood loss was 500 mL.  Cross-clamp time of 118 minutes and bypass time of 132 minutes.  Post procedure patient is transferred to ICU.  No other intraoperative complications.  Patient did not require any blood transfusion intraoperatively. No arrhythmias currently and on no pressors.  Currently on insulin drip.    1.  Admit to intensive care unit for closer hemodynamic monitoring and vent weaning.  2.  CT surgery following postoperative course.  Monitor for any excessive chest tube output.  Monitor for any arrhythmias.  3.  Continue aspirin, statin.  Beta-blockers as tolerated hemodynamically.  4.  Respiratory wise doing fair.  Ventilator adjustments made by decreasing the tidal volume due to respiratory alkalosis.  Wean FiO2 as tolerated to keep saturations 92% or better.  Once more awake will perform spontaneous breathing trial and plan to extubate the patient.  5.  Urine output is acceptable.  Monitor renal function.  Monitor electrolytes and replace as needed per ICU protocol.  6.  Postoperative pain control.  7.  Insulin infusion as needed to keep blood glucose 140-180 range.  8.  GI prophylaxis with Protonix.  SCDs for DVT prophylaxis.    Continue rest of the ICU care for now.      I have seen and examined patient, performing a face-to-face diagnostic evaluation with plan of care reviewed and developed with APRN and nursing staff. I have  addended and modified the above history of present illness, conducted physical examination and documented as above, and assessment and plan to reflect my findings and impressions.     Time spent  38 min   (exclusive of procedure time)  including high complexity decision making to assess, manipulate, and support vital organ system failure in this individual who has impairment of one or more vital organ systems such that there is a high probability of imminent or life threatening deterioration in the patient’s condition.      Nik Curry MD, [facility]  Pulmonary and Critical Care Medicine  01/28/25 14:29 EST

## 2025-01-28 NOTE — ANESTHESIA PROCEDURE NOTES
Intra-Op Anesthesia EMRE    Procedure Performed: Intra-Op Anesthesia EMRE       Start Time:        End Time:   1/28/2025 8:37 AM    Preanesthesia Checklist:  Patient identified, IV assessed, risks and benefits discussed, monitors and equipment assessed, procedure being performed at surgeon's request and anesthesia consent obtained.    General Procedure Information  EMRE Placed for monitoring purposes only -- This is not a diagnostic EMRE  Diagnostic Indications for Echo:  assessment of ascending aorta and hemodynamic monitoring  Physician Requesting Echo: Zeferino Del Castillo MD  Location performed:  OR  Intubated  Bite block not placed  Heart visualized  Probe Insertion:  Easy  Probe Type:  Multiplane  Modalities:  2D only, color flow mapping, continuous wave Doppler and pulse wave Doppler    Echocardiographic and Doppler Measurements    Ventricles    Right Ventricle:  Cavity size normal.  Diastolic dimension 3.5 cm.  Hypertrophy not present.  Thrombus not present.  Global function normal.    Left Ventricle:  Cavity size normal.  Diastolic dimension 4.68 cm.  Thrombus not present.  Global Function moderately impaired.  Ejection Fraction 36%.      Ventricular Regional Function:  1- Basal Anteroseptal:  normal  2- Basal Anterior:  normal  3- Basal Anterolateral:  normal  4- Basal Inferolateral:  normal  5- Basal Inferior:  normal  6- Basal Inferoseptal:  normal  7- Mid Anteroseptal:  hypokinetic  8- Mid Anterior:  hypokinetic  9- Mid Anterolateral:  normal  10- Mid Inferolateral:  normal  11- Mid Inferior:  normal  12- Mid Inferoseptal:  normal  13- Apical Anterior:  akinetic  14- Apical Lateral:  dyskinetic  15- Apical Inferior:  akinetic  16- Apical Septal:  akinetic  17- [place]:  akinetic      Valves    Aortic Valve:  Annulus normal.  Stenosis not present.  Regurgitation absent.  Leaflets normal.  Leaflet motions normal.      Mitral Valve:  Annulus normal.  Stenosis not present.  Regurgitation trace.  Leaflets normal.   Leaflet motions normal.      Tricuspid Valve:  Annulus normal.  Stenosis not present.  Regurgitation absent.  Leaflets normal.  Leaflet motions normal.    Pulmonic Valve:  Annulus normal.        Aorta    Ascending Aorta:  Size normal.  Dissection not present.  Plaque thickness less than 3 mm.  Mobile plaque not present.    Aortic Arch:  Size normal.  Dissection not present.  Plaque thickness less than 3 mm.  Mobile plaque not present.    Descending Aorta:  Size normal.  Dissection not present.  Plaque thickness less than 3 mm.  Mobile plaque not present.        Atria    Right Atrium:  Size normal.  Spontaneous echo contrast not present.  Thrombus not present.  Tumor not present.  Device not present.      Left Atrium:  Size normal.  Spontaneous echo contrast not present.  Thrombus not present.  Tumor not present.  Device not present.    Left atrial appendage normal.      Septa    Atrial Septum:  Intra-atrial septal morphology aneurysmal.      Ventricular Septum:  Intra-ventricular septum morphology normal.      Diastolic Function Measurements:  Diastolic Dysfunction Grade= indeterminate  E=  71 ms  A=  69 ms  E/A Ratio=  1  DT=  ms  S/D=   IVRT=    Other Findings  Pericardium:  normal  Pleural Effusion:  none  Pulmonary Arteries:  normal  Pulmonary Venous Flow:  normal    Anesthesia Information      Echocardiogram Comments:       Moderately impaired lv function  EF estimated at 38%  [place] of LV is akinetic/dyskinetic  Nl valvular function  Images recorded of RADHA

## 2025-01-28 NOTE — STS RISK SCORE
Procedure Type: Isolated CABG  Perioperative Outcome Estimate %  Operative Mortality 1.82%  Morbidity & Mortality 5.94%  Stroke 1.44%  Renal Failure 0.723%  Reoperation 2.15%  Prolonged Ventilation 2.86%  Deep Sternal Wound Infection 0.148%  Long Hospital Stay (>14 days) 2.82%  Short Hospital Stay (<6 days)* 46.2%        Clinical Summary  Planned Surgery: Isolated CABG, Elective, First cardiovascular surgery  Demographics: 79 year old, female, 55kg, 158cm, BMI: 22 kg/m²  Lab Values: Creatinine: 1.12 mg/dL, Hematocrit: 43%, WBC Count: 8 10³/?L, Platelet Count: 538249 cells/?L  PreOp Medications: ACE Inhibitors/ARBs <= 48 hrs, Oral diabetes control  Substance Abuse: Never smoker  Risk Factors / Comorbidities: Diabetes Mellitus , Hypertension, Family Hx of CAD  Pulmonary RF: Unknown CLD  Coronary Artery Disease: 3 vessels diseased, Proximal LAD Stenosis >= 70%, Unstable Angina

## 2025-01-28 NOTE — H&P
Office Visit  1/15/2025  [facility] CARDIOTHORACIC SURGERY     Mine Hanson PA-C  Cardiothoracic Surgery Coronary artery disease involving native coronary artery of native heart with angina pectoris  Dx Coronary Artery Disease ; Referred by Eddi Cheatham MD  Reason for Visit     Progress Notes  Mine Hanson PA-C (Physician Assistant)  Cardiothoracic Surgery  Expand All Collapse All                   [facility] Cardiothoracic Surgery Office Follow Up Note      Date of Encounter: 01/15/2025      Name: Abena Washington  : 1945      Referred By: Eddi Cheatham MD  PCP: Eddi Cheatham MD     Chief Complaint:         Chief Complaint   Patient presents with    Coronary Artery Disease       New patient per Dr. Eddi Cheatham for 2nd opinion CABG eval          Subjective      History of Present Illness:    Abena Washington is a 79 y.o. female presents for second opinion of CAD. She had cardiac catheterization on 10/30/24 at [facility] and was found to have severe three vessel disease including 30-40% left main stenosis, 100% chronic total occlusion of LAD, 70% diagonal branch, 70% left circumflex, 70% stenosis OM1, and 95% RCA stenosis.      Patient is having very frequent episodes of chest pressure. This is with activity and at rest, and even exposure to cold air can trigger an episode of chest pressure. This is relieved with belching and episodes resolve quickly. She is having numbness and tingling of the fingers bilaterally.   She has a strong family history of CAD. Dr. Del Castillo performed a CABG on her son about 8 years ago, which is why she felt strongly about getting a second opinion.      She denies previous thoracic surgery or radiation to the chest. She has recently had an excision of a skin lesion found to be melanoma on her right upper scapular region but has received no further treatment as of yet.   Of note, when the cardiac catheterization was performed, patient states they attempted  "access via right radial but were unable and had to use femoral access. Patient is right handed.      Review of Systems:  Review of Systems   Constitutional: Negative for chills, decreased appetite, diaphoresis, fever, malaise/fatigue, night sweats, weight gain and weight loss.   HENT:  Negative for hoarse voice.    Eyes:  Negative for blurred vision, double vision and visual disturbance.   Cardiovascular:  Positive for chest pain (pressure in chest, sometimes slight \"twinge\" of pain). Negative for claudication, dyspnea on exertion, irregular heartbeat, leg swelling, near-syncope, orthopnea, palpitations, paroxysmal nocturnal dyspnea and syncope.   Respiratory:  Negative for cough, hemoptysis, shortness of breath, sputum production and wheezing.    Hematologic/Lymphatic: Negative for adenopathy and bleeding problem. Does not bruise/bleed easily.   Skin:  Negative for color change, nail changes, poor wound healing and rash.   Musculoskeletal:  Negative for back pain, falls and muscle cramps.   Gastrointestinal:  Positive for heartburn (burping with the episodes of chest pressure). Negative for abdominal pain and dysphagia.   Genitourinary:  Negative for flank pain.   Neurological:  Positive for dizziness. Negative for brief paralysis, disturbances in coordination, focal weakness, headaches, light-headedness, loss of balance, numbness, paresthesias, sensory change, vertigo and weakness.   Psychiatric/Behavioral:  Negative for depression and suicidal ideas.    Allergic/Immunologic: Negative for persistent infections.         I have reviewed the following portions of the patient's history: problem list, current medications, allergies, past surgical history, past medical history, past social history, past family history, and ROS and confirm it's accurate.     Allergies:  Allergies   No Known Allergies        Medications:      Current Medications      Current Outpatient Medications:     alendronate (FOSAMAX) 70 MG tablet, " Take 1 tablet by mouth Every 7 (Seven) Days., Disp: , Rfl:     amLODIPine (NORVASC) 10 MG tablet, Take 1 tablet by mouth Daily., Disp: , Rfl:     aspirin 81 MG EC tablet, Take 1 tablet by mouth Daily., Disp: , Rfl:     carvedilol (COREG) 12.5 MG tablet, Take 1 tablet by mouth 2 (Two) Times a Day With Meals., Disp: , Rfl:     empagliflozin (JARDIANCE) 10 MG tablet tablet, Take  by mouth Daily., Disp: , Rfl:     fluticasone (FLONASE) 50 MCG/ACT nasal spray, Administer 2 sprays into the nostril(s) as directed by provider Daily., Disp: , Rfl:     gabapentin (NEURONTIN) 300 MG capsule, Take 1 capsule by mouth 2 (Two) Times a Day As Needed., Disp: , Rfl:     glipizide (GLUCOTROL) 10 MG tablet, Take 2 tablets by mouth 2 (Two) Times a Day Before Meals., Disp: , Rfl:     hydroCHLOROthiazide (HYDRODIURIL) 25 MG tablet, Take 1 tablet by mouth Daily., Disp: , Rfl:     metFORMIN (GLUCOPHAGE) 1000 MG tablet, Take 1 tablet by mouth 2 (Two) Times a Day With Meals., Disp: , Rfl:     naproxen (NAPROSYN) 500 MG tablet, Take 1 tablet by mouth 2 (Two) Times a Day As Needed for Mild Pain., Disp: , Rfl:     pioglitazone (ACTOS) 45 MG tablet, Take 1 tablet by mouth Daily As Needed., Disp: , Rfl:     potassium chloride (K-DUR,KLOR-CON) 20 MEQ CR tablet, Take 1 tablet by mouth 2 (Two) Times a Day., Disp: , Rfl:     potassium chloride (KLOR-CON) 20 MEQ packet, Take 20 mEq by mouth Daily., Disp: , Rfl:     sacubitril-valsartan (Entresto) 49-51 MG tablet, Take 1 tablet by mouth 2 (Two) Times a Day., Disp: , Rfl:     SITagliptin (JANUVIA) 100 MG tablet, Take 1 tablet by mouth Daily., Disp: , Rfl:     difluprednate (DUREZOL) 0.05 % ophthalmic emulsion, Follow Instructions on AVS (Patient not taking: Reported on 1/15/2025), Disp: , Rfl:     difluprednate (DUREZOL) 0.05 % ophthalmic emulsion, Follow Instructions on AVS (Patient not taking: Reported on 1/15/2025), Disp: , Rfl:     enalapril (VASOTEC) 20 MG tablet, Take 1 tablet by mouth 2 (Two)  Times a Day. (Patient not taking: Reported on 1/15/2025), Disp: , Rfl:     lovastatin (MEVACOR) 40 MG tablet, Take 1 tablet by mouth 2 (Two) Times a Day. (Patient not taking: Reported on 1/15/2025), Disp: , Rfl:         History:   Medical History        Past Medical History:   Diagnosis Date    Anemia      Anxiety and depression      Arthritis      Delayed emergence from anesthesia      Diabetes mellitus      Heart attack      Hyperlipidemia      Hypertension      Nausea      Peripheral neuropathy      PONV (postoperative nausea and vomiting)      Skin melanoma      Wears glasses              Surgical History         Past Surgical History:   Procedure Laterality Date    CARDIAC CATHETERIZATION        CATARACT EXTRACTION W/ INTRAOCULAR LENS IMPLANT Left 06/21/2023     Procedure: CATARACT PHACO EXTRACTION WITH INTRAOCULAR LENS IMPLANT LEFT;  Surgeon: Mina Hendrickson MD;  Location: [facility];  Service: Ophthalmology;  Laterality: Left;    CATARACT EXTRACTION W/ INTRAOCULAR LENS IMPLANT Right 07/07/2023     Procedure: CATARACT PHACO EXTRACTION WITH INTRAOCULAR LENS IMPLANT RIGHT;  Surgeon: Mina Hendrickson MD;  Location: [facility];  Service: Ophthalmology;  Laterality: Right;    CHOLECYSTECTOMY        COLONOSCOPY        ENDOSCOPY        HYSTERECTOMY        SKIN CANCER EXCISION        TUBAL ABDOMINAL LIGATION                Social History   Social History           Socioeconomic History    Marital status: Unknown   Tobacco Use    Smoking status: Never    Smokeless tobacco: Never   Vaping Use    Vaping status: Never Used   Substance and Sexual Activity    Alcohol use: Never    Drug use: Never    Sexual activity: Defer                  Family History   Problem Relation Age of Onset    Diabetes Mother      Dementia Mother      Coronary artery disease Father      Stroke Son      Coronary artery disease Son           Objective      Physical Exam:  Vitals       Vitals:     01/15/25 0957   BP: 138/58   BP Location:  "Right arm   Patient Position: Sitting   Pulse: 57   Temp: 97.9 °F (36.6 °C)   SpO2: 99%   Weight: 53.8 kg (118 lb 9.6 oz)   Height: 157.5 cm (62\")         Body mass index is 21.69 kg/m².     Physical Exam  Vitals and nursing note reviewed.   Constitutional:       Appearance: Normal appearance.   Cardiovascular:      Rate and Rhythm: Normal rate and regular rhythm.      Pulses: Normal pulses.           Radial pulses are 2+ on the right side and 2+ on the left side.      Heart sounds: Normal heart sounds.   Pulmonary:      Effort: Pulmonary effort is normal.      Breath sounds: Normal breath sounds.   Musculoskeletal:      Right lower leg: No edema.      Left lower leg: No edema.   Skin:     Capillary Refill: Capillary refill takes less than 2 seconds.   Neurological:      General: No focal deficit present.      Mental Status: She is alert and oriented to person, place, and time.   Psychiatric:         Mood and Affect: Mood normal.         Behavior: Behavior normal.            Imaging/Labs:  Cath images and report reviewed         Assessment / Plan       Assessment / Plan:     Severe three vessel CAD   30-40% left main stenosis, 100% chronic total occlusion of LAD, 70% diagonal branch, 70% left circumflex, 70% stenosis OM1, and 95% RCA stenosis  Patient has diabetes A1c 6.9%, HTN, HLD and is a reasonable candidate for CABG.   Discussed the procedure in great detail and answered all questions. Risks, benefits, and alternatives were presented to the patient and through shared decision making, the patient elects to proceed with procedure.   Episodes of chest pressure several days of the week. Advised patient to present to ED with further episodes of chest pressure  Discussed LEAAPS trial, but patient not a candidate due to sensitivity to nickel jewelry.         Follow Up:   Schedule for CABG as soon as possible   Advised patient that if she has further episodes of chest pressure, she needs to present to the nearest ED   " "     Mine Hanson PA-C   [facility] Cardiothoracic Surgery                            Time Spent: I spent 45 minutes caring for Abena on this date of service. This time includes time spent by me in the following activities: preparing for the visit, reviewing tests, obtaining and/or reviewing a separately obtained history, performing a medically appropriate examination and/or evaluation, counseling and educating the patient/family/caregiver, ordering medications, tests, or procedures, referring and communicating with other health care professionals, documenting information in the medical record, independently interpreting results and communicating that information with the patient/family/caregiver, and care coordination.          Instructions    After Visit Summary (Printed 1/15/2025),     COVID Immunization Verification (Printed 1/15/2025),     COVID Lab Result Verification (Printed 1/15/2025)  Additional Documentation    Vitals: /58 (BP Location: Right arm, Patient Position: Sitting)     Pulse 57     Temp 97.9 °F (36.6 °C)     Ht 157.5 cm (62\")     Wt 53.8 kg (118 lb 9.6 oz)     SpO2 99%     BMI 21.69 kg/m²     BSA 1.53 m²   Encounter Info: Billing Info,     History,     Allergies,     Detailed Report,     ...(8 more)     Communications      [facility] Visit Summary sent to Eddi Cheatham MD  Media  From this encounter  Scan on 1/16/2025 0924 by Julee Tejeda: ENC RW 01/15/25   Scan on 11/15/2024 1149 by Trina Jamison V: correspondence per rom   Scan on 11/15/2024 1146 by Trina Jamison V: correspondence per rom     Encounter Information    Encounter Information   Provider Department Encounter #   1/15/2025 10:30 AM Mine Hanson PA-C [facility] [id number]     Primary Visit Coverage    Payer Plan Sponsor Code Group Number Group Name   HUMANA MEDICARE REPLACEMENT HUMANA MED ADV PPO  [id number]      Primary Visit Coverage Subscriber    Subscriber ID Subscriber Name Subscriber " [facility] Subscriber Address   [id number] MAURICE CORONADO  [address] [place] [id number]     Orders Placed    None  Medication Changes      None  Medication List  Visit Diagnoses      Coronary artery disease involving native coronary artery of native heart with angina pectoris  Problem List   Current Medications    [facility]    Full history and physical note from office is up to date.     /64 (BP Location: Left arm, Patient Position: Lying)   Pulse 66   Temp 98 °F (36.7 °C) (Temporal)   Resp 16   SpO2 98%   Denies allergy to contrast dye or latex  IMM:  Influenza: Yes  Pneumococcal: Yes  Tetanus: Up-to-date  Lungs: Clear to auscultation bilaterally bases  Cardiovascular: S1-S2 without rubs murmurs or gallops  Abdomen: Soft, nontender, bowel sounds present throughout.    LAB Results:  Lab Results   Component Value Date    WBC 8.12 01/27/2025    HGB 13.6 01/27/2025    HCT 42.9 01/27/2025    MCV 91.1 01/27/2025     01/27/2025    NEUTROABS 4.62 01/27/2025    GLUCOSE 102 (H) 01/27/2025    BUN 25 (H) 01/27/2025    CREATININE 1.12 (H) 01/27/2025     01/27/2025    K 4.2 01/27/2025    CL 98 01/27/2025    CO2 27.0 01/27/2025    MG 2.2 01/27/2025    CALCIUM 9.7 01/27/2025    ALBUMIN 4.7 01/27/2025    AST 15 01/27/2025    ALT 14 01/27/2025    BILITOT 0.2 01/27/2025    PTT 21.7 (L) 01/27/2025    INR 0.91 01/27/2025       Cancer Staging (if applicable)  Cancer Patient: __ yes __no __unknown__N/A; If yes, clinical stage T:__ N:__M:__, stage group or __N/A  Impression: Angina pectoris  Multivessel coronary artery disease  Hypertension  Hyperlipidemia  Diabetes mellitus  Anxiety and depression  Renal insufficiency with a BUN of 25 and creatinine 1.12 respectively  Plan: Coronary artery bypass grafting, radial artery harvest, or transesophageal echocardiogram with anesthesia.  SAM Wang   01/28/25   6:32 AM EST

## 2025-01-28 NOTE — OP NOTE
DATE OF PROCEDURE: 1/28/2025     PREOPERATIVE DIAGNOSES:  1. Multivessel coronary artery disease  2. Unstable angina  3. Hypertension  4. Hyperlipidemia  5. Diabetes mellitus (Hgb A1C 8.30)  6. Family history of coronary artery disease     POSTOPERATIVE DIAGNOSES:    1. Multivessel coronary artery disease  2. Unstable angina  3. Hypertension  4. Hyperlipidemia  5. Diabetes mellitus (Hgb A1C 8.30)  6. Family history of coronary artery disease     PROCEDURES PERFORMED:    1. Coronary artery bypass graft x 5  2. Endoscopic left greater saphenous vein harvest       SURGEON: Zeferino Del Castillo MD       ASSISTANTS:  Camilo Aleman MD was responsible for performing the following activities: Retraction and Suction and their skilled assistance was necessary for the success of this case.  Kirk Petty PA was responsible for performing the following activities: Retraction, Suction, Closing, Placing Dressing, and Harvesting of Vessels and their skilled assistance was necessary for the success of this case.    Circulator: Rosana Casper RN; Mckyala Allen, MADDI; Nila Ramos RN  Perfusionist: Tashi Handy  Scrub Person: Mary Barnes; Christophe Green  Nursing Assistant: Heaven Lares Allie    ANESTHESIA: General endotracheal anesthesia with Dr. Shun Fuentes MD     ESTIMATED BLOOD LOSS: 500 mL     CROSSCLAMP TIME: 118 minutes       TOTAL CARDIOPULMONARY BYPASS TIME: 132 minutes      DISTAL BYPASS TARGETS:    1. LIMA to LAD  2. Greater saphenous vein to PDA  3. Greater saphenous vein to OM1  4. Greater saphenous vein to OM3  5. Greater saphenous vein to D1    INDICATIONS: 79-year-old  female with a history of hypertension, hyperlipidemia, poorly controlled diabetes mellitus and family history of coronary artery disease who presented with unstable angina.  She was found to have multivessel coronary artery disease and was felt to warrant surgical revascularization. The risks and benefits of surgery  were discussed with the patient including pain, bleeding, infection, renal failure, stroke and death along with the STS risk score. The patient understood these risks and wished to proceed with surgery.      DESCRIPTION OF PROCEDURE: The patient was taken to the operating room and placed under general endotracheal anesthesia. A central line, radial arterial line, and Mckinney catheter were placed intraoperatively. The patient was prepped and draped in the usual sterile fashion and a timeout was performed verifying the patient's name, procedure, consent, beta blockade administration and antibiotics.    The left radial artery could not be harvested given a failed Barbeau test.  The left greater saphenous vein was harvested from the groin to the ankle using endoscopic technique. Subcutaneous tissues were closed with a running 3-0 Vicryl suture and 4-0 Monocryl subcuticular stitch. Simultaneously, a median sternotomy incision was made and electrocautery was utilized to gain access to the sternum. A midline sternotomy was performed after lung desufflation and hemostasis was achieved with electrocautery.    Attention was turned to the left internal mammary artery, which was taken down using electrocautery and small clips. Dissection was performed proximally to the subclavian vein and distally to the bifurcation. The bifurcation was ligated with 2 large clips to each branch and sharply divided. This revealed excellent flow within the mammary artery which was ligated distally using small clips and irrigated with papaverine solution and placed in a soaked Ray-Vernon sponge in the left pleural space. The pericardium was opened and stay sutures were placed to create a pericardial well. Next, 3-0 Prolene sutures were placed in the ascending aorta and systemic heparin was administered. Additional cannulation sutures were placed in the right atrial appendage, ascending aorta, and right atrium. After verification of satisfactory  activated clotting time, the arterial cannula was placed and connected to the cardiopulmonary bypass circuit after being de-aired. The line was tested and a wrap was performed. The venous cannula was inserted followed by antegrade and retrograde cardioplegia lines. The vein was inspected and found to be of appropriate caliber and quality for bypass grafting. A slit within the pericardial well along the cephalad portion was created for appropriate transition of the mammary pedicle into the mediastinum. Cardiopulmonary bypass was initiated and the patient was allowed to drift in temperature and distal bypass targets were verified. Bypass flow was dropped and the aortic crossclamp was applied. Cardioplegia was administered in an antegrade fashion with immediate cessation of cardiac activity. There was an acceptable septal temperature response. The root vent suction was turned on high and the right coronary artery distribution was thoroughly inspected.    The PDA was diffusely diseased on palpation.  The PDA was 1.5 mm in diameter and an arteriotomy was made on the mid portion of this vessel and extended proximally and distally. An end-to-side anastomosis was performed with running 7-0 Prolene suture and tied down. Cardioplegia was given down the anastomosis via hand injection with no evidence of leak and good blood flow. Verification of vein length was obtained by filling the heart and the vein graft was trimmed to the appropriate length. The third obtuse marginal branch was 1.5 mm in diameter and an arteriotomy was made on the mid portion of this vessel and extended proximally and distally. An end-to-side anastomosis was performed with running 7-0 Prolene suture and tied down. Cardioplegia was given down the anastomosis via hand injection with no evidence of leak and good blood flow. Verification of vein length was obtained by filling the heart and the vein graft was trimmed to the appropriate length. The first obtuse  marginal branch was 1.75 mm in diameter and an arteriotomy was made on the mid portion of this vessel where it traveled intramyocardial.  The vessel had diffuse disease on palpation.  An end-to-side anastomosis was performed with running 7-0 Prolene suture and tied down. Cardioplegia was given down the anastomosis via hand injection with no evidence of leak and good blood flow. Verification of vein length was obtained by filling the heart and the vein graft was trimmed to the appropriate length. The first diagonal branch was 0.5 mm in diameter and an arteriotomy was made on the mid portion of this vessel and extended proximally and distally. An end-to-side anastomosis was performed with running 7-0 Prolene suture and tied down. Cardioplegia was given down the anastomosis via hand injection with no evidence of leak and good blood flow. Verification of vein length was obtained by filling the heart and the vein graft was trimmed to the appropriate length. The LAD was inspected and found to have diffuse disease on palpation and inspection down its entire length. An arteriotomy was made on the distal LAD and extended proximally and distally on this 1.5 mm diameter vessel.  A 1.5 mm probe was passed proximally and immediately met significant disease.  Distal disease was also present within the vessel on probing toward the apex.  The internal mammary artery was then prepared for grafting by ligating the 2 venous branches along the pedicle using small clips. The mammary artery was trimmed proximal to the bifurcation and beveled for grafting. An end-to-side anastomosis with an 8-0 Prolene suture was constructed and tied down. The bulldog clamp was removed and there was excellent flow within the vessel and adequate filling of the LAD. The underlying mammary fascia was secured to the epicardium using two 6-0 Prolene sutures.  Four aortotomies were then made on the proximal ascending aorta and end-to-side proximal anastomoses  were carried out using 6-0 Prolene suture and labeled with a radiopaque washers. A hot shot was given down the ascending aorta after bulldog clamps were applied to the vein grafts. The veins were de-aired and the patient was placed in steep Trendelenburg position. The root vent was turned on high suction and cardiopulmonary bypass flow was turned down with crossclamp subsequently removed. Bypass flows were returned to normal and the bulldog clamps were removed. The heart returned to spontaneous sinus rhythm without fibrillation. The proximal and distal anastomoses were then inspected and found to be hemostatic.   The patient was subsequently weaned from cardiopulmonary bypass and decannulation was successfully carried out. All cannulation sites were reinforced with additional 4-0 Prolene suture and inspected for hemostasis. Doppler examination of bypass grafts revealed excellent flow within the mammary and vein grafts.  Ventricular pacing wires were placed and secured using 0 silk suture. Three chest tubes were then placed within the left and right pleural spaces and mediastinum. These were secured using a 0 Ethibond suture. The sternum was reapproximated with #7 stainless steel wire and the linea alba was closed with a running 0 Vicryl suture. Subcutaneous tissues were closed with a 2-0 Vicryl suture and the inferior aspect of the incision was closed with additional interrupted 2-0 Vicryl sutures in the dermal layer. The skin was reapproximated with a 4-0 Monocryl subcuticular stitch and overlying skin glue was applied to the incision. Gauze and tape were applied to the chest tube sites and the patient was subsequently transported to the cardiac ICU in stable condition, intubated.

## 2025-01-28 NOTE — ANESTHESIA PREPROCEDURE EVALUATION
Anesthesia Evaluation     Patient summary reviewed and Nursing notes reviewed   NPO Solid Status: > 8 hours  NPO Liquid Status: > 8 hours           Airway   Mallampati: I  TM distance: >3 FB  Neck ROM: full  No difficulty expected  Dental - normal exam     Pulmonary - normal exam   Cardiovascular   Exercise tolerance: poor (<4 METS)    Rhythm: regular  Rate: normal        Neuro/Psych  GI/Hepatic/Renal/Endo      Musculoskeletal     Abdominal    Substance History      OB/GYN          Other                    Anesthesia Plan    ASA 4     general     ( A line cvp EMRE  Post op ventilation Return to ICU post op)  intravenous induction   Postoperative Plan: Expected vent after surgery  Anesthetic plan, risks, benefits, and alternatives have been provided, discussed and informed consent has been obtained with: patient.    CODE STATUS:

## 2025-01-28 NOTE — ANESTHESIA PROCEDURE NOTES
Airway  Urgency: elective    Date/Time: 1/28/2025 7:24 AM  Airway not difficult    General Information and Staff    Patient location during procedure: OR  Anesthesiologist: Shun Fuentes MD    Indications and Patient Condition  Indications for airway management: airway protection    Preoxygenated: yes  MILS not maintained throughout  Mask difficulty assessment: 1 - vent by mask    Final Airway Details  Final airway type: endotracheal airway      Successful airway: ETT  Cuffed: yes   Successful intubation technique: direct laryngoscopy  Endotracheal tube insertion site: oral  Blade: Arley  Blade size: 3  ETT size (mm): 7.0  Cormack-Lehane Classification: grade I - full view of glottis  Placement verified by: chest auscultation and capnometry   Measured from: lips  ETT/EBT  to lips (cm): 20  Number of attempts at approach: 1  Assessment: lips, teeth, and gum same as pre-op and atraumatic intubation    Additional Comments  Negative epigastric sounds, Breath sound equal bilaterally with symmetric chest rise and fall

## 2025-01-29 ENCOUNTER — APPOINTMENT (OUTPATIENT)
Dept: GENERAL RADIOLOGY | Facility: HOSPITAL | Age: 80
DRG: 235 | End: 2025-01-29
Payer: MEDICARE

## 2025-01-29 LAB
ACT BLD: 141 SECONDS (ref 82–152)
ACT BLD: 486 SECONDS (ref 82–152)
ACT BLD: 515 SECONDS (ref 82–152)
ACT BLD: 527 SECONDS (ref 82–152)
ACT BLD: 878 SECONDS (ref 82–152)
ALBUMIN SERPL-MCNC: 4.8 G/DL (ref 3.5–5.2)
ALBUMIN/GLOB SERPL: 3.7 G/DL
ALP SERPL-CCNC: 31 U/L (ref 39–117)
ALT SERPL W P-5'-P-CCNC: 23 U/L (ref 1–33)
ANION GAP SERPL CALCULATED.3IONS-SCNC: 13 MMOL/L (ref 5–15)
ARTERIAL PATENCY WRIST A: ABNORMAL
ARTERIAL PATENCY WRIST A: ABNORMAL
AST SERPL-CCNC: 49 U/L (ref 1–32)
ATMOSPHERIC PRESS: ABNORMAL MM[HG]
ATMOSPHERIC PRESS: ABNORMAL MM[HG]
BASE EXCESS BLDA CALC-SCNC: -0.8 MMOL/L (ref 0–2)
BASE EXCESS BLDA CALC-SCNC: -1.4 MMOL/L (ref 0–2)
BASE EXCESS BLDA CALC-SCNC: -5 MMOL/L (ref -5–5)
BASE EXCESS BLDA CALC-SCNC: -6 MMOL/L (ref -5–5)
BASE EXCESS BLDA CALC-SCNC: 0 MMOL/L (ref -5–5)
BASE EXCESS BLDA CALC-SCNC: 2 MMOL/L (ref -5–5)
BASE EXCESS BLDA CALC-SCNC: 3 MMOL/L (ref -5–5)
BASE EXCESS BLDA CALC-SCNC: 5 MMOL/L (ref -5–5)
BASE EXCESS BLDA CALC-SCNC: 8 MMOL/L (ref -5–5)
BASOPHILS # BLD AUTO: 0.03 10*3/MM3 (ref 0–0.2)
BASOPHILS NFR BLD AUTO: 0.3 % (ref 0–1.5)
BDY SITE: ABNORMAL
BDY SITE: ABNORMAL
BILIRUB SERPL-MCNC: 0.4 MG/DL (ref 0–1.2)
BODY TEMPERATURE: 37
BODY TEMPERATURE: 37
BUN SERPL-MCNC: 20 MG/DL (ref 8–23)
BUN/CREAT SERPL: 15.2 (ref 7–25)
CA-I BLDA-SCNC: 0.88 MMOL/L (ref 1.2–1.32)
CA-I BLDA-SCNC: 0.88 MMOL/L (ref 1.2–1.32)
CA-I BLDA-SCNC: 0.93 MMOL/L (ref 1.2–1.32)
CA-I BLDA-SCNC: 0.94 MMOL/L (ref 1.2–1.32)
CA-I BLDA-SCNC: 0.94 MMOL/L (ref 1.2–1.32)
CA-I BLDA-SCNC: 1.03 MMOL/L (ref 1.2–1.32)
CA-I BLDA-SCNC: 1.19 MMOL/L (ref 1.2–1.32)
CA-I BLDA-SCNC: 1.23 MMOL/L (ref 1.2–1.32)
CA-I BLDA-SCNC: 1.31 MMOL/L (ref 1.2–1.32)
CALCIUM SPEC-SCNC: 8.8 MG/DL (ref 8.6–10.5)
CHLORIDE SERPL-SCNC: 106 MMOL/L (ref 98–107)
CO2 BLDA-SCNC: 20 MMOL/L (ref 24–29)
CO2 BLDA-SCNC: 20 MMOL/L (ref 24–29)
CO2 BLDA-SCNC: 25 MMOL/L (ref 24–29)
CO2 BLDA-SCNC: 25 MMOL/L (ref 24–29)
CO2 BLDA-SCNC: 26.6 MMOL/L (ref 22–33)
CO2 BLDA-SCNC: 27 MMOL/L (ref 24–29)
CO2 BLDA-SCNC: 27.9 MMOL/L (ref 22–33)
CO2 BLDA-SCNC: 29 MMOL/L (ref 24–29)
CO2 BLDA-SCNC: 33 MMOL/L (ref 24–29)
CO2 SERPL-SCNC: 26 MMOL/L (ref 22–29)
COHGB MFR BLD: 0.9 % (ref 0–2)
COHGB MFR BLD: 1.1 % (ref 0–2)
CREAT SERPL-MCNC: 1.32 MG/DL (ref 0.57–1)
DEPRECATED RDW RBC AUTO: 47.5 FL (ref 37–54)
EGFRCR SERPLBLD CKD-EPI 2021: 41.7 ML/MIN/1.73
EOSINOPHIL # BLD AUTO: 0.01 10*3/MM3 (ref 0–0.4)
EOSINOPHIL NFR BLD AUTO: 0.1 % (ref 0.3–6.2)
EPAP: 0
ERYTHROCYTE [DISTWIDTH] IN BLOOD BY AUTOMATED COUNT: 14.1 % (ref 12.3–15.4)
GLOBULIN UR ELPH-MCNC: 1.3 GM/DL
GLUCOSE BLDC GLUCOMTR-MCNC: 101 MG/DL (ref 70–130)
GLUCOSE BLDC GLUCOMTR-MCNC: 101 MG/DL (ref 70–130)
GLUCOSE BLDC GLUCOMTR-MCNC: 115 MG/DL (ref 70–130)
GLUCOSE BLDC GLUCOMTR-MCNC: 121 MG/DL (ref 70–130)
GLUCOSE BLDC GLUCOMTR-MCNC: 128 MG/DL (ref 70–130)
GLUCOSE BLDC GLUCOMTR-MCNC: 131 MG/DL (ref 70–130)
GLUCOSE BLDC GLUCOMTR-MCNC: 133 MG/DL (ref 70–130)
GLUCOSE BLDC GLUCOMTR-MCNC: 140 MG/DL (ref 70–130)
GLUCOSE BLDC GLUCOMTR-MCNC: 141 MG/DL (ref 70–130)
GLUCOSE BLDC GLUCOMTR-MCNC: 143 MG/DL (ref 70–130)
GLUCOSE BLDC GLUCOMTR-MCNC: 145 MG/DL (ref 70–130)
GLUCOSE BLDC GLUCOMTR-MCNC: 160 MG/DL (ref 70–130)
GLUCOSE BLDC GLUCOMTR-MCNC: 163 MG/DL (ref 70–130)
GLUCOSE BLDC GLUCOMTR-MCNC: 169 MG/DL (ref 70–130)
GLUCOSE BLDC GLUCOMTR-MCNC: 172 MG/DL (ref 70–130)
GLUCOSE BLDC GLUCOMTR-MCNC: 186 MG/DL (ref 70–130)
GLUCOSE BLDC GLUCOMTR-MCNC: 202 MG/DL (ref 70–130)
GLUCOSE BLDC GLUCOMTR-MCNC: 204 MG/DL (ref 70–130)
GLUCOSE BLDC GLUCOMTR-MCNC: 253 MG/DL (ref 70–130)
GLUCOSE BLDC GLUCOMTR-MCNC: 97 MG/DL (ref 70–130)
GLUCOSE SERPL-MCNC: 191 MG/DL (ref 65–99)
HCO3 BLDA-SCNC: 19.1 MMOL/L (ref 22–26)
HCO3 BLDA-SCNC: 19.1 MMOL/L (ref 22–26)
HCO3 BLDA-SCNC: 23.7 MMOL/L (ref 22–26)
HCO3 BLDA-SCNC: 23.8 MMOL/L (ref 22–26)
HCO3 BLDA-SCNC: 25.2 MMOL/L (ref 20–26)
HCO3 BLDA-SCNC: 25.2 MMOL/L (ref 22–26)
HCO3 BLDA-SCNC: 26 MMOL/L (ref 22–26)
HCO3 BLDA-SCNC: 26.1 MMOL/L (ref 20–26)
HCO3 BLDA-SCNC: 26.4 MMOL/L (ref 22–26)
HCO3 BLDA-SCNC: 28.2 MMOL/L (ref 22–26)
HCO3 BLDA-SCNC: 31.8 MMOL/L (ref 22–26)
HCT VFR BLD AUTO: 26 % (ref 34–46.6)
HCT VFR BLD CALC: 25 % (ref 38–51)
HCT VFR BLD CALC: 26.6 % (ref 38–51)
HCT VFR BLDA CALC: 16 % (ref 38–51)
HCT VFR BLDA CALC: 23 % (ref 38–51)
HCT VFR BLDA CALC: 24 % (ref 38–51)
HCT VFR BLDA CALC: 24 % (ref 38–51)
HCT VFR BLDA CALC: 25 % (ref 38–51)
HCT VFR BLDA CALC: 29 % (ref 38–51)
HCT VFR BLDA CALC: 36 % (ref 38–51)
HGB BLD-MCNC: 8.3 G/DL (ref 12–15.9)
HGB BLDA-MCNC: 12.2 G/DL (ref 12–17)
HGB BLDA-MCNC: 5.4 G/DL (ref 12–17)
HGB BLDA-MCNC: 7.8 G/DL (ref 12–17)
HGB BLDA-MCNC: 8.2 G/DL (ref 12–17)
HGB BLDA-MCNC: 8.2 G/DL (ref 12–17)
HGB BLDA-MCNC: 8.2 G/DL (ref 14–18)
HGB BLDA-MCNC: 8.5 G/DL (ref 12–17)
HGB BLDA-MCNC: 8.7 G/DL (ref 14–18)
HGB BLDA-MCNC: 9.9 G/DL (ref 12–17)
IMM GRANULOCYTES # BLD AUTO: 0.04 10*3/MM3 (ref 0–0.05)
IMM GRANULOCYTES NFR BLD AUTO: 0.4 % (ref 0–0.5)
INHALED O2 CONCENTRATION: 40 %
INHALED O2 CONCENTRATION: 40 %
INR PPP: 1.24 (ref 0.89–1.12)
IPAP: 0
LYMPHOCYTES # BLD AUTO: 0.79 10*3/MM3 (ref 0.7–3.1)
LYMPHOCYTES NFR BLD AUTO: 7.1 % (ref 19.6–45.3)
MAGNESIUM SERPL-MCNC: 2.6 MG/DL (ref 1.6–2.4)
MCH RBC QN AUTO: 29.4 PG (ref 26.6–33)
MCHC RBC AUTO-ENTMCNC: 31.9 G/DL (ref 31.5–35.7)
MCV RBC AUTO: 92.2 FL (ref 79–97)
METHGB BLD QL: 1 % (ref 0–1.5)
METHGB BLD QL: 1.1 % (ref 0–1.5)
MODALITY: ABNORMAL
MODALITY: ABNORMAL
MONOCYTES # BLD AUTO: 0.61 10*3/MM3 (ref 0.1–0.9)
MONOCYTES NFR BLD AUTO: 5.4 % (ref 5–12)
NEUTROPHILS NFR BLD AUTO: 86.7 % (ref 42.7–76)
NEUTROPHILS NFR BLD AUTO: 9.72 10*3/MM3 (ref 1.7–7)
NRBC BLD AUTO-RTO: 0 /100 WBC (ref 0–0.2)
OXYHGB MFR BLDV: 96.1 % (ref 94–99)
OXYHGB MFR BLDV: 97.5 % (ref 94–99)
PAW @ PEAK INSP FLOW SETTING VENT: 0 CMH2O
PCO2 BLDA: 28 MM HG (ref 35–45)
PCO2 BLDA: 29.9 MM HG (ref 35–45)
PCO2 BLDA: 30.8 MM HG (ref 35–45)
PCO2 BLDA: 35 MM HG (ref 35–45)
PCO2 BLDA: 35.4 MM HG (ref 35–45)
PCO2 BLDA: 35.6 MM HG (ref 35–45)
PCO2 BLDA: 36.8 MM HG (ref 35–45)
PCO2 BLDA: 41.7 MM HG (ref 35–45)
PCO2 BLDA: 44.6 MM HG (ref 35–45)
PCO2 BLDA: 47.4 MM HG (ref 35–45)
PCO2 BLDA: 58.8 MM HG (ref 35–45)
PCO2 TEMP ADJ BLD: 47.4 MM HG (ref 35–45)
PCO2 TEMP ADJ BLD: 58.8 MM HG (ref 35–45)
PEEP RESPIRATORY: 5 CM[H2O]
PEEP RESPIRATORY: 5 CM[H2O]
PH BLDA: 7.26 PH UNITS (ref 7.35–7.45)
PH BLDA: 7.33 PH UNITS (ref 7.35–7.45)
PH BLDA: 7.36 PH UNITS (ref 7.35–7.6)
PH BLDA: 7.41 PH UNITS (ref 7.35–7.6)
PH BLDA: 7.44 PH UNITS (ref 7.35–7.6)
PH BLDA: 7.44 PH UNITS (ref 7.35–7.6)
PH BLDA: 7.48 PH UNITS (ref 7.35–7.6)
PH BLDA: 7.48 PH UNITS (ref 7.35–7.6)
PH BLDA: 7.49 PH UNITS (ref 7.35–7.6)
PH BLDA: 7.49 PH UNITS (ref 7.35–7.6)
PH BLDA: 7.5 PH UNITS (ref 7.35–7.6)
PH, TEMP CORRECTED: 7.26 PH UNITS
PH, TEMP CORRECTED: 7.33 PH UNITS
PLATELET # BLD AUTO: 160 10*3/MM3 (ref 140–450)
PMV BLD AUTO: 12.1 FL (ref 6–12)
PO2 BLDA: 109 MM HG (ref 83–108)
PO2 BLDA: 129 MM HG (ref 83–108)
PO2 BLDA: 148 MMHG (ref 80–105)
PO2 BLDA: 360 MMHG (ref 80–105)
PO2 BLDA: 374 MMHG (ref 80–105)
PO2 BLDA: 38 MMHG (ref 80–105)
PO2 BLDA: 395 MMHG (ref 80–105)
PO2 BLDA: 419 MMHG (ref 80–105)
PO2 BLDA: 426 MMHG (ref 80–105)
PO2 BLDA: 427 MMHG (ref 80–105)
PO2 BLDA: 469 MMHG (ref 80–105)
PO2 TEMP ADJ BLD: 109 MM HG (ref 83–108)
PO2 TEMP ADJ BLD: 129 MM HG (ref 83–108)
POTASSIUM BLDA-SCNC: 2.6 MMOL/L (ref 3.5–4.9)
POTASSIUM BLDA-SCNC: 2.9 MMOL/L (ref 3.5–4.9)
POTASSIUM BLDA-SCNC: 3.7 MMOL/L (ref 3.5–4.9)
POTASSIUM BLDA-SCNC: 3.7 MMOL/L (ref 3.5–4.9)
POTASSIUM BLDA-SCNC: 4.4 MMOL/L (ref 3.5–4.9)
POTASSIUM BLDA-SCNC: 4.5 MMOL/L (ref 3.5–4.9)
POTASSIUM BLDA-SCNC: 4.7 MMOL/L (ref 3.5–4.9)
POTASSIUM BLDA-SCNC: 5 MMOL/L (ref 3.5–4.9)
POTASSIUM BLDA-SCNC: 5.3 MMOL/L (ref 3.5–4.9)
POTASSIUM SERPL-SCNC: 4 MMOL/L (ref 3.5–5.2)
PROT SERPL-MCNC: 6.1 G/DL (ref 6–8.5)
PROTHROMBIN TIME: 15.8 SECONDS (ref 12.2–14.5)
PSV: 10 CMH2O
PSV: 10 CMH2O
QT INTERVAL: 390 MS
QTC INTERVAL: 453 MS
RBC # BLD AUTO: 2.82 10*6/MM3 (ref 3.77–5.28)
SAO2 % BLDA: 100 % (ref 95–98)
SAO2 % BLDA: 74 % (ref 95–98)
SAO2 % BLDA: 99 % (ref 95–98)
SET MECH RESP RATE: 0
SODIUM BLD-SCNC: 138 MMOL/L (ref 138–146)
SODIUM BLD-SCNC: 138 MMOL/L (ref 138–146)
SODIUM BLD-SCNC: 139 MMOL/L (ref 138–146)
SODIUM BLD-SCNC: 140 MMOL/L (ref 138–146)
SODIUM BLD-SCNC: 140 MMOL/L (ref 138–146)
SODIUM BLD-SCNC: 141 MMOL/L (ref 138–146)
SODIUM BLD-SCNC: 142 MMOL/L (ref 138–146)
SODIUM BLD-SCNC: 145 MMOL/L (ref 138–146)
SODIUM BLD-SCNC: 145 MMOL/L (ref 138–146)
SODIUM SERPL-SCNC: 145 MMOL/L (ref 136–145)
TOTAL RATE: 0 BREATHS/MINUTE
TOTAL RATE: 8 BREATHS/MINUTE
VENTILATOR MODE: ABNORMAL
VENTILATOR MODE: ABNORMAL
WBC NRBC COR # BLD AUTO: 11.2 10*3/MM3 (ref 3.4–10.8)

## 2025-01-29 PROCEDURE — 25010000002 CEFAZOLIN PER 500 MG: Performed by: PHYSICIAN ASSISTANT

## 2025-01-29 PROCEDURE — P9041 ALBUMIN (HUMAN),5%, 50ML: HCPCS | Performed by: PHYSICIAN ASSISTANT

## 2025-01-29 PROCEDURE — 94799 UNLISTED PULMONARY SVC/PX: CPT

## 2025-01-29 PROCEDURE — 25010000002 ALBUMIN HUMAN 5% PER 50 ML: Performed by: PHYSICIAN ASSISTANT

## 2025-01-29 PROCEDURE — 99232 SBSQ HOSP IP/OBS MODERATE 35: CPT | Performed by: INTERNAL MEDICINE

## 2025-01-29 PROCEDURE — 94002 VENT MGMT INPAT INIT DAY: CPT

## 2025-01-29 PROCEDURE — 82375 ASSAY CARBOXYHB QUANT: CPT

## 2025-01-29 PROCEDURE — 93010 ELECTROCARDIOGRAM REPORT: CPT | Performed by: INTERNAL MEDICINE

## 2025-01-29 PROCEDURE — 82948 REAGENT STRIP/BLOOD GLUCOSE: CPT

## 2025-01-29 PROCEDURE — 83050 HGB METHEMOGLOBIN QUAN: CPT

## 2025-01-29 PROCEDURE — 85610 PROTHROMBIN TIME: CPT | Performed by: PHYSICIAN ASSISTANT

## 2025-01-29 PROCEDURE — 25010000002 ONDANSETRON PER 1 MG: Performed by: PHYSICIAN ASSISTANT

## 2025-01-29 PROCEDURE — 93005 ELECTROCARDIOGRAM TRACING: CPT | Performed by: PHYSICIAN ASSISTANT

## 2025-01-29 PROCEDURE — 94003 VENT MGMT INPAT SUBQ DAY: CPT

## 2025-01-29 PROCEDURE — 83735 ASSAY OF MAGNESIUM: CPT | Performed by: PHYSICIAN ASSISTANT

## 2025-01-29 PROCEDURE — 85025 COMPLETE CBC W/AUTO DIFF WBC: CPT | Performed by: PHYSICIAN ASSISTANT

## 2025-01-29 PROCEDURE — 63710000001 PROCHLORPERAZINE MALEATE PER 10 MG: Performed by: INTERNAL MEDICINE

## 2025-01-29 PROCEDURE — 99233 SBSQ HOSP IP/OBS HIGH 50: CPT | Performed by: INTERNAL MEDICINE

## 2025-01-29 PROCEDURE — 80053 COMPREHEN METABOLIC PANEL: CPT | Performed by: PHYSICIAN ASSISTANT

## 2025-01-29 PROCEDURE — 94761 N-INVAS EAR/PLS OXIMETRY MLT: CPT

## 2025-01-29 PROCEDURE — 71045 X-RAY EXAM CHEST 1 VIEW: CPT

## 2025-01-29 PROCEDURE — 63710000001 INSULIN GLARGINE PER 5 UNITS: Performed by: INTERNAL MEDICINE

## 2025-01-29 PROCEDURE — 82805 BLOOD GASES W/O2 SATURATION: CPT

## 2025-01-29 PROCEDURE — 63710000001 INSULIN REGULAR HUMAN PER 5 UNITS: Performed by: INTERNAL MEDICINE

## 2025-01-29 RX ORDER — ASPIRIN 325 MG
325 TABLET ORAL DAILY
Status: DISCONTINUED | OUTPATIENT
Start: 2025-01-29 | End: 2025-01-31

## 2025-01-29 RX ORDER — PROCHLORPERAZINE MALEATE 10 MG
10 TABLET ORAL EVERY 6 HOURS PRN
Status: DISCONTINUED | OUTPATIENT
Start: 2025-01-29 | End: 2025-01-31

## 2025-01-29 RX ORDER — HYDROCODONE BITARTRATE AND ACETAMINOPHEN 7.5; 325 MG/1; MG/1
1 TABLET ORAL EVERY 4 HOURS PRN
Status: DISCONTINUED | OUTPATIENT
Start: 2025-01-29 | End: 2025-01-30

## 2025-01-29 RX ORDER — ALBUMIN HUMAN 50 G/1000ML
250 SOLUTION INTRAVENOUS
Status: COMPLETED | OUTPATIENT
Start: 2025-01-29 | End: 2025-01-29

## 2025-01-29 RX ORDER — NICOTINE POLACRILEX 4 MG
15 LOZENGE BUCCAL
Status: DISCONTINUED | OUTPATIENT
Start: 2025-01-29 | End: 2025-01-29

## 2025-01-29 RX ORDER — ACETAMINOPHEN 500 MG
1000 TABLET ORAL EVERY 8 HOURS
Status: DISCONTINUED | OUTPATIENT
Start: 2025-01-29 | End: 2025-01-31

## 2025-01-29 RX ORDER — OXYCODONE HYDROCHLORIDE 10 MG/1
10 TABLET ORAL EVERY 4 HOURS PRN
Status: DISCONTINUED | OUTPATIENT
Start: 2025-01-29 | End: 2025-01-30

## 2025-01-29 RX ORDER — IBUPROFEN 600 MG/1
1 TABLET ORAL
Status: DISCONTINUED | OUTPATIENT
Start: 2025-01-29 | End: 2025-02-03

## 2025-01-29 RX ORDER — DEXTROSE MONOHYDRATE 25 G/50ML
25 INJECTION, SOLUTION INTRAVENOUS
Status: DISCONTINUED | OUTPATIENT
Start: 2025-01-29 | End: 2025-02-03

## 2025-01-29 RX ORDER — PROCHLORPERAZINE MALEATE 10 MG
10 TABLET ORAL EVERY 6 HOURS PRN
Status: DISCONTINUED | OUTPATIENT
Start: 2025-01-29 | End: 2025-01-29

## 2025-01-29 RX ORDER — AMOXICILLIN 250 MG
2 CAPSULE ORAL 2 TIMES DAILY
Status: DISCONTINUED | OUTPATIENT
Start: 2025-01-29 | End: 2025-01-31

## 2025-01-29 RX ORDER — SCOPOLAMINE 1 MG/3D
1 PATCH, EXTENDED RELEASE TRANSDERMAL
Status: DISCONTINUED | OUTPATIENT
Start: 2025-01-29 | End: 2025-02-05 | Stop reason: HOSPADM

## 2025-01-29 RX ORDER — INSULIN LISPRO 100 [IU]/ML
2-7 INJECTION, SOLUTION INTRAVENOUS; SUBCUTANEOUS
Status: DISCONTINUED | OUTPATIENT
Start: 2025-01-30 | End: 2025-02-03

## 2025-01-29 RX ORDER — ATORVASTATIN CALCIUM 40 MG/1
40 TABLET, FILM COATED ORAL NIGHTLY
Status: DISCONTINUED | OUTPATIENT
Start: 2025-01-29 | End: 2025-01-31

## 2025-01-29 RX ORDER — GABAPENTIN 100 MG/1
100 CAPSULE ORAL 3 TIMES DAILY
Status: DISCONTINUED | OUTPATIENT
Start: 2025-01-29 | End: 2025-01-31

## 2025-01-29 RX ORDER — PANTOPRAZOLE SODIUM 40 MG/10ML
40 INJECTION, POWDER, LYOPHILIZED, FOR SOLUTION INTRAVENOUS
Status: DISCONTINUED | OUTPATIENT
Start: 2025-01-29 | End: 2025-02-01

## 2025-01-29 RX ORDER — ASPIRIN 325 MG
325 TABLET ORAL DAILY
Status: DISCONTINUED | OUTPATIENT
Start: 2025-01-29 | End: 2025-01-29

## 2025-01-29 RX ADMIN — ONDANSETRON 4 MG: 2 INJECTION INTRAMUSCULAR; INTRAVENOUS at 16:55

## 2025-01-29 RX ADMIN — NOREPINEPHRINE BITARTRATE 0.05 MCG/KG/MIN: 0.03 INJECTION, SOLUTION INTRAVENOUS at 20:25

## 2025-01-29 RX ADMIN — GABAPENTIN 100 MG: 100 CAPSULE ORAL at 20:06

## 2025-01-29 RX ADMIN — PROCHLORPERAZINE MALEATE 10 MG: 10 TABLET ORAL at 13:32

## 2025-01-29 RX ADMIN — SCOPOLAMINE 1 PATCH: 1.5 PATCH, EXTENDED RELEASE TRANSDERMAL at 09:56

## 2025-01-29 RX ADMIN — GABAPENTIN 100 MG: 100 CAPSULE ORAL at 08:57

## 2025-01-29 RX ADMIN — Medication 10 ML: at 20:07

## 2025-01-29 RX ADMIN — ATORVASTATIN CALCIUM 40 MG: 40 TABLET, FILM COATED ORAL at 20:06

## 2025-01-29 RX ADMIN — PANTOPRAZOLE SODIUM 40 MG: 40 INJECTION, POWDER, FOR SOLUTION INTRAVENOUS at 05:43

## 2025-01-29 RX ADMIN — ACETAMINOPHEN 1000 MG: 500 TABLET, FILM COATED ORAL at 20:06

## 2025-01-29 RX ADMIN — ALBUMIN (HUMAN) 250 ML: 12.5 INJECTION, SOLUTION INTRAVENOUS at 01:23

## 2025-01-29 RX ADMIN — SODIUM CHLORIDE 2000 MG: 900 INJECTION INTRAVENOUS at 09:58

## 2025-01-29 RX ADMIN — SODIUM CHLORIDE 2000 MG: 900 INJECTION INTRAVENOUS at 17:00

## 2025-01-29 RX ADMIN — SENNOSIDES AND DOCUSATE SODIUM 2 TABLET: 50; 8.6 TABLET ORAL at 08:54

## 2025-01-29 RX ADMIN — ACETAMINOPHEN 1000 MG: 500 TABLET ORAL at 05:43

## 2025-01-29 RX ADMIN — INSULIN HUMAN 3 UNITS: 100 INJECTION, SOLUTION PARENTERAL at 17:00

## 2025-01-29 RX ADMIN — Medication 10 ML: at 08:58

## 2025-01-29 RX ADMIN — GABAPENTIN 100 MG: 100 CAPSULE ORAL at 16:54

## 2025-01-29 RX ADMIN — INSULIN GLARGINE 10 UNITS: 100 INJECTION, SOLUTION SUBCUTANEOUS at 10:09

## 2025-01-29 RX ADMIN — ACETAMINOPHEN 1000 MG: 500 TABLET, FILM COATED ORAL at 13:08

## 2025-01-29 RX ADMIN — ASPIRIN 325 MG: 325 TABLET ORAL at 09:56

## 2025-01-29 RX ADMIN — SENNOSIDES AND DOCUSATE SODIUM 2 TABLET: 50; 8.6 TABLET ORAL at 20:06

## 2025-01-29 RX ADMIN — ALBUMIN (HUMAN) 250 ML: 12.5 INJECTION, SOLUTION INTRAVENOUS at 08:36

## 2025-01-29 RX ADMIN — MUPIROCIN 1 APPLICATION: 20 OINTMENT TOPICAL at 20:07

## 2025-01-29 RX ADMIN — CHLORHEXIDINE GLUCONATE 15 ML: 1.2 SOLUTION ORAL at 20:07

## 2025-01-29 RX ADMIN — ALBUMIN (HUMAN) 250 ML: 12.5 INJECTION, SOLUTION INTRAVENOUS at 09:57

## 2025-01-29 RX ADMIN — ONDANSETRON 4 MG: 2 INJECTION INTRAMUSCULAR; INTRAVENOUS at 08:41

## 2025-01-29 RX ADMIN — CHLORHEXIDINE GLUCONATE 15 ML: 1.2 SOLUTION ORAL at 08:54

## 2025-01-29 RX ADMIN — SODIUM CHLORIDE 2000 MG: 900 INJECTION INTRAVENOUS at 01:46

## 2025-01-29 RX ADMIN — MUPIROCIN 1 APPLICATION: 20 OINTMENT TOPICAL at 08:58

## 2025-01-29 NOTE — CASE MANAGEMENT/SOCIAL WORK
Discharge Planning Assessment  [facility]     Patient Name: Abena Washington  MRN: [id number]  Today's Date: 1/29/2025    Admit Date: 1/28/2025    Plan: IDP   Discharge Needs Assessment       Row Name 01/29/25 1137       Living Environment    Potentially Unsafe Housing Conditions none    In the past 12 months has the electric, gas, oil, or water company threatened to shut off services in your home? No    Primary Care Provided by self    Provides Primary Care For no one    Family Caregiver if Needed child(jeet), adult    Family Caregiver Names Vero    Quality of Family Relationships unable to assess    Able to Return to Prior Arrangements no       Resource/Environmental Concerns    Resource/Environmental Concerns none    Transportation Concerns none       Transportation Needs    In the past 12 months, has lack of transportation kept you from medical appointments or from getting medications? no    In the past 12 months, has lack of transportation kept you from meetings, work, or from getting things needed for daily living? No       Food Insecurity    Within the past 12 months, you worried that your food would run out before you got the money to buy more. Never true    Within the past 12 months, the food you bought just didn't last and you didn't have money to get more. Never true       Transition Planning    Patient/Family Anticipates Transition to home with family    Patient/Family Anticipated Services at Transition none    Transportation Anticipated family or friend will provide       Discharge Needs Assessment    Readmission Within the Last 30 Days no previous admission in last 30 days    Equipment Currently Used at Home walker, rolling    Concerns to be Addressed no discharge needs identified;denies needs/concerns at this time    Do you want help finding or keeping work or a job? I do not need or want help    Do you want help with school or training? For example, starting or completing job training or getting a  high school diploma, GED or equivalent No    Anticipated Changes Related to Illness none    Equipment Needed After Discharge none                   Discharge Plan       Row Name 01/29/25 1139       Plan    Plan IDP    Patient/Family in Agreement with Plan yes    Plan Comments Spoke with patient at bedside for IDP. Lives in [facility] with daughter. IADL. RW. No HH/OPPT. PCP Eddi Cheatham. [employer] Adv with scripts filled at [facility]. Plan is to return home with daughter to transport. CM will  cont to follow.                  Continued Care and Services - Admitted Since 1/28/2025    No active coordination exists for this encounter.          Demographic Summary       Row Name 01/29/25 1136       General Information    Admission Type inpatient    Arrived From home    Referral Source admission list    Reason for Consult decision-making    Preferred Language English                   Functional Status       Row Name 01/29/25 1137       Functional Status    Usual Activity Tolerance good    Current Activity Tolerance moderate       Physical Activity    On average, how many days per week do you engage in moderate to strenuous exercise (like a brisk walk)? 3 days    On average, how many minutes do you engage in exercise at this level? 30 min    Number of minutes of exercise per week 90                   Psychosocial    No documentation.                  Abuse/Neglect    No documentation.                  Legal    No documentation.                  Substance Abuse    No documentation.                  Patient Forms    No documentation.                     Anna Muñoz RN

## 2025-01-29 NOTE — PLAN OF CARE
Problem: Mechanical Ventilation Invasive  Goal: Mechanical Ventilation Liberation  Outcome: Unable to Meet   Goal Outcome Evaluation:   Unable to wean mechanical ventilation during PM shift. Several failed pressure support trials. Hypercapnic and acidotic. Vent settings currently SIMV/VC+ / FiO2 40%/ Rate 14/ PS 10 and PEEP of 5

## 2025-01-29 NOTE — PROGRESS NOTES
Intensive Care Follow-up     Hospital:  LOS: 1 day   Ms. Abena Washington, 79 y.o. female is followed for:   Coronary artery disease involving native coronary artery of native heart   Postoperative mechanical ventilation, hemodynamic, electrolyte monitoring          History of present illness:   79-year-old female with diabetes, hypertension, coronary disease, dyslipidemia recently seen by cardiology team with complaints of intermittent/exertional chest pressure.  Echo showed EF of 45 to 50%.  Underwent stress testing which was abnormal and underwent subsequently left heart cath which showed multivessel coronary disease.  Patient was referred to CT surgery for surgical revascularization.  Patient underwent preop workup which included bilateral carotid duplex which did not show any significant carotid disease.  Spirometry was done but results are not available.  Patient was deemed surgical candidate so underwent coronary bypass graft surgery x 5 by Dr. Del Castillo on 1/28.  Estimated blood loss was 500 mL.  Cross-clamp time of 118 minutes and bypass time of 132 minutes.  Post procedure patient is transferred to ICU.  No other intraoperative complications.  Patient did not require any blood transfusion intraoperatively.     Subjective   Interval History:  Patient was unable to be extubated overnight and due to marginal blood gases.  Eventually was able to be extubated at 7 AM today morning.  Complaining of significant nausea and apparently has previous history of postoperative nausea.  Transfemoral A-line in place.  Currently on low-dose norepinephrine which we are attempting to wean.  Getting albumin infusion.                 The patient's past medical, surgical and social history were reviewed and updated in Epic as appropriate.       Objective     Infusions:  dexmedetomidine, 0.2-1.5 mcg/kg/hr  DOBUTamine, 2-20 mcg/kg/min  DOPamine, 2-20 mcg/kg/min  EPINEPHrine, 0.02-0.3 mcg/kg/min  EPINEPHrine, 0.02-0.3 mcg/kg/min, Last  "Rate: Stopped (01/28/25 1453)  niCARdipine, 5-15 mg/hr  niCARdipine, 5-15 mg/hr  norepinephrine, 0.02-0.3 mcg/kg/min, Last Rate: 0.04 mcg/kg/min (01/29/25 1400)  phenylephrine, 0.5-3 mcg/kg/min      Medications:  acetaminophen, 1,000 mg, Nasogastric, Q8H  aspirin, 325 mg, Nasogastric, Daily  atorvastatin, 40 mg, Nasogastric, Nightly  ceFAZolin, 2,000 mg, Intravenous, Q8H  chlorhexidine, 15 mL, Mouth/Throat, Q12H  gabapentin, 100 mg, Nasogastric, TID  insulin glargine, 10 Units, Subcutaneous, Daily  insulin regular, 2-7 Units, Subcutaneous, Q6H  metoprolol tartrate, 12.5 mg, Nasogastric, Q12H  mupirocin, 1 Application, Each Nare, BID  pantoprazole, 40 mg, Intravenous, Q AM  pharmacy consult - MTM, , Not Applicable, Daily  protamine, 50 mg, Intravenous, Once  Scopolamine, 1 patch, Transdermal, Q72H  senna-docusate sodium, 2 tablet, Nasogastric, BID  sodium chloride, 10 mL, Intravenous, Q12H        Vital Sign Min/Max for last 24 hours  Temp  Min: 94.6 °F (34.8 °C)  Max: 99.1 °F (37.3 °C)   BP  Min: 80/40  Max: 118/65   Pulse  Min: 65  Max: 92   Resp  Min: 12  Max: 28   SpO2  Min: 87 %  Max: 100 %   Flow (L/min) (Oxygen Therapy)  Min: 2  Max: 4       Input/Output for last 24 hour shift  01/28 0701 - 01/29 0700  In: 2675 [I.V.:1341]  Out: 4050 [Urine:2320]   Mode: PS  FiO2 (%):  [40 %] 40 %  S RR:  [14] 14  S VT:  [400 mL] 400 mL  PEEP/CPAP (cm H2O):  [5 cm H20] 5 cm H20  DC SUP:  [0 cm H20-10 cm H20] 0 cm H20  MAP (cm H2O):  [5.9-9.7] 5.9  Objective:  Vital signs: (most recent): Blood pressure 93/46, pulse 90, temperature 98.4 °F (36.9 °C), temperature source Axillary, resp. rate 12, height 157.5 cm (62\"), weight 54.7 kg (120 lb 9.5 oz), SpO2 94%.            General Appearance: Awake, alert, in no acute distress   Lungs:   B/L Breath sounds present with decreased breath sounds on bases, no wheezing heard, occ crackles.  Chest tube in place and draining serosanguineous fluid  Heart: S1 and S2 present, no murmur  Abdomen: " Soft, nontender, no guarding or rigidity, bowel sounds positive.  Extremities:  no edema, warm to touch.  Neurologic:  Moving all four extremities. Good strength bilaterally.   Psychological: Normal affect, Cooperative      Results from last 7 days   Lab Units 01/29/25  0314 01/28/25  1755 01/28/25  1321   WBC 10*3/mm3 11.20* 7.82 5.12   HEMOGLOBIN g/dL 8.3* 8.0* 8.7*   PLATELETS 10*3/mm3 160 134* 117*     Results from last 7 days   Lab Units 01/29/25  0314 01/28/25  1755 01/28/25  1321   SODIUM mmol/L 145 146* 146*   POTASSIUM mmol/L 4.0 4.3 4.0   CO2 mmol/L 26.0 28.0 28.0   BUN mg/dL 20 18 18   CREATININE mg/dL 1.32* 1.18* 1.06*   MAGNESIUM mg/dL 2.6* 2.7* 3.1*   PHOSPHORUS mg/dL  --  4.1 3.2   GLUCOSE mg/dL 191* 197* 127*     Estimated Creatinine Clearance: 30.8 mL/min (A) (by C-G formula based on SCr of 1.32 mg/dL (H)).    Results from last 7 days   Lab Units 01/29/25  0652   PH, ARTERIAL pH units 7.334*   PCO2, ARTERIAL mm Hg 47.4*   PO2 ART mm Hg 129.0*       Images:   Chest x-ray reviewed and showed ET tube data/clavicles.  Right IJ catheter in good position.  Patient s/p median sternotomy.  Chest tubes in good position.  No obvious pneumothorax noted.    I reviewed the patient's results and images.     Assessment & Plan   Impression        CAD s/p CABG x 5 1/28/2025    CAD (coronary artery disease)       Plan        1.  Patient status post coronary bypass graft surgery.  CT surgery and cardiology following postop course.  Continue aspirin, statin and beta-blockers as tolerated hemodynamically.  Currently hemodynamically unstable requiring norepinephrine.  Getting albumin bolus.  Will monitor hemodynamics and wean pressors as tolerated.  2.  Continue chest tube drainage.  No excessive bleeding noted.  Hemoglobin stable overnight.  3.  Urine output marginal and creatinine slightly worsened overnight.  Continue to monitor urine output.  Again getting albumin infusion.  Will adjust fluids based on urine output  and lab data.  4.  Blood sugars are acceptable with current insulin infusion.  We will go ahead and start patient on 10 units of Lantus and sliding scale coverage for now.  5.  Patient has femoral arterial line difficult to mobilize.  Once patient's hemodynamics improved   then can discontinue femoral line and mobilize patient.  6.  GI prophylaxis.  SCDs for DVT prophylaxis.  7.  Significant nausea and vomiting.  Getting Zofran.  Added scopolamine patch.  Will use Compazine as needed.  Patient previously has history of postoperative nausea and vomiting as well.  8.  Judicious pain control.    Continue close monitoring in ICU.      Plan of care and goals reviewed with multidisciplinary/antibiotic stewardship team during rounds.   I discussed the patient's findings and my recommendations with patient and nursing staff       Time spent  35 min  (exclusive of procedure time)  including high complexity decision making to assess, manipulate, and support vital organ system failure in this individual who has impairment of one or more vital organ systems such that there is a high probability of imminent or life threatening deterioration in the patient’s condition.  Above documentation reviewed and reflect accurate information as of 1/29/2025 including copied elements of the note.       Nik Curry MD, [facility]  Pulmonary, Critical care and Sleep Medicine

## 2025-01-29 NOTE — PROGRESS NOTES
CTS Progress Note      POD # 1 s/p CABG x 5     LOS: 1 day   Extubated this morning. Remains on Levophed at 0.06.      Objective    Vital Signs  Temp:  [94.6 °F (34.8 °C)-99.1 °F (37.3 °C)] 98.2 °F (36.8 °C)  Heart Rate:  [63-81] 77  Resp:  [14-28] 14  BP: ()/(40-76) 95/44  Arterial Line BP: ()/(34-66) 118/52  FiO2 (%):  [40 %-100 %] 40 %    Physical Exam:   General Appearance: alert, appears stated age and cooperative   Lungs: Decreased lung sounds francisco   Heart: regular rhythm & normal rate, normal S1, S2 and no murmur, no gallop, no rub   Chest: sternum stable   Skin: Incision c/d/I   CT: #3-320 cc /24 hrs         #1 and #2-310 cc/ 24 hrs     Results     Results from last 7 days   Lab Units 01/29/25  0314   WBC 10*3/mm3 11.20*   HEMOGLOBIN g/dL 8.3*   HEMATOCRIT % 26.0*   PLATELETS 10*3/mm3 160     Results from last 7 days   Lab Units 01/29/25  0314   SODIUM mmol/L 145   POTASSIUM mmol/L 4.0   CHLORIDE mmol/L 106   CO2 mmol/L 26.0   BUN mg/dL 20   CREATININE mg/dL 1.32*   GLUCOSE mg/dL 191*   CALCIUM mg/dL 8.8       Imaging Results (Last 24 Hours)       Procedure Component Value Units Date/Time    XR Chest 1 View [[id number]] Collected: 01/29/25 0753     Updated: 01/29/25 0758    Narrative:      XR CHEST 1 VW    Date of Exam: 1/29/2025 12:34 AM EST    Indication: Post-Op Heart Surgery    Comparison: January 28, 2025    Findings:  ET tube tip is at the level of clavicles. Right IJ line tip is within the superior vena cava. There are bilateral thoracotomy tubes and mediastinal chest tube. There is a nasogastric tube noted with its tip in the area of the stomach. Sternotomy wires   are present. The lungs seem relatively clear. It is equivocal whether there may be a tiny left apical pneumothorax. The heart appears enlarged.      Impression:      Impression:  1.Lines and tubes as noted.  2.Questionable tiny left apical pneumothorax.        Electronically Signed: Edenilson Grossman MD    1/29/2025 7:54 AM EST     Workstation ID: [id number]    XR Chest 1 View [[id number]] Collected: 01/28/25 1403     Updated: 01/28/25 1409    Narrative:      XR CHEST 1 VW, XR CHEST PA AND LATERAL    Date of Exam: 1/28/2025 1:02 PM EST    Indication: Post-Op Check Line & Tube Placement    Comparison: None    Findings:  Chest radiograph 1/27/2025: Heart size normal. Negative for lobar consolidation, edema, effusion or pneumothorax. Degenerative related osseous change. Cholecystectomy clips.    Chest radiograph 1/28/2025: Median sternotomy and CABG. ET tube measures 5.8 cm above solomon terminating over upper third thoracic trachea. Antegrade oriented gastric tube terminates along the proximal greater curvature of stomach. Side port just below   GE junction. Right IJ central catheter terminates over low SVC. Thoracotomy tubes. Heart size normal. Negative for lobar consolidation, large effusion or significant pneumothorax. Mild prominence of the interstitium slightly more conspicuous from prior.      Impression:      Impression:  Chest radiograph 1/27/2025  1. No active pulmonary process.    Chest radiograph 1/28/2025  1. Postoperative changes of CABG with support devices as detailed above. No significant pneumothorax.  2. Slight increasing interstitial prominence suggesting vascular congestion versus developing interstitial edema.      Electronically Signed: Ryan Barragan MD    1/28/2025 2:06 PM EST    Workstation ID: [id number]            Assessment  POD # 1 s/p CABG x 5    CAD s/p CABG x 5 1/28/2025    CAD (coronary artery disease)    Plan   Continue chest tubes to suction  Wean levophed as tolerated  Up to chair  Ambulate  Pulmonary toilet    Zeferino Del Castillo MD  01/29/25  07:58 EST

## 2025-01-29 NOTE — NURSING NOTE
Pt. Referred for Phase II Cardiac Rehab. Staff discussed benefits of exercise, program protocol, and educational material provided. Teach back verified.    Patient's family is at bedside and [facility], the closest Phase II cardiac rehab, is over one hour drive one way from their home and this would be difficult to commit to participating.  Exercise guidelines are left with patient and it is explained to them that Ms. Washington has up to one year from the date of her surgery to qualify for insurance coverage for Phase II, if their situation changes at all.  Patient and family are receptive to teaching and state an understanding.

## 2025-01-29 NOTE — PLAN OF CARE
Goal Outcome Evaluation:           Progress: improving     Pt s/p CABG w/ Dr. Del Castillo 1/28/25    Neuro: Pt able to follow commands and ROJAS=; opens eys spontaneously; unable to assess orientation; + /dorsiflexion  Resp: Pt remains intubated; spontaneous breathing trials attempted x3, all unsuccessful d/t poor ABGs; Vent settings: SIMV, 40% FiO2, PEEP 5, 400mL TV; CT outputs: 1/2: 110mL 3: 160mL  Cardiac: NSR; SBP >130; +PP; S1,S2 w/ rub; foot pumps in place  GI/: 300mL of bile removed via NGT, +BS, -BM; UOP: 560  T Max: 37.3*C    Pain managed w/ PRNs  Family updated overnight    Current gtts: Levo, Insulin

## 2025-01-29 NOTE — PLAN OF CARE
Goal Outcome Evaluation:      1. Neuro-  Ms Felix is alert and oriented, follows commands and moves all extremities equally.  Bedrest d/t femoral art line.    2. Respiratory-  2 LT NC kept oxygen saturation greater than 95%.  Chest tube drainage-  CT 1 & 2= 200 ml    CT 3= 110     3. Cardiac-  Sins Rhythm (HR 75-85 bpm) with SBP  mmHg requiring Levophed 0.06 mcg/kg/min.  Cardiac Index > 2.7    4.  GI-  N/V throughout shift.  Zofran, Scopolamine patch and Compazine administered.  Pt requesting sips of water and ice chips.    5. -  450 ml urine output.

## 2025-01-29 NOTE — PROGRESS NOTES
[facility]  INPATIENT PROGRESS NOTE         [facility]    1/29/2025      PATIENT IDENTIFICATION:   Name:  Abena Washington      MRN:  [id number]     79 y.o.  female             Reason for visit: Chronic systolic heart failure, CAD, hypertension, hyperlipidemia      SUBJECTIVE:   Family at bedside, patient is quite sore today, overall fatigued, on norepinephrine for blood pressure support.  Elevated creatinine today.     OBJECTIVE:  Vitals:    01/29/25 1245 01/29/25 1300 01/29/25 1315 01/29/25 1330   BP:  105/49  91/44   BP Location:       Patient Position:       Pulse: 89 88 92    Resp:       Temp:       TempSrc:       SpO2: 93% 93% 90%    Weight:       Height:         FiO2 (%): 40 %     Body mass index is 22.06 kg/m².    Intake/Output Summary (Last 24 hours) at 1/29/2025 1350  Last data filed at 1/29/2025 1200  Gross per 24 hour   Intake 3446 ml   Output 2510 ml   Net 936 ml       Telemetry: Personally reviewed, normal sinus rhythm, no arrhythmia     Exam:  General Appearance:   Frail, chronically ill-appearing  Neck:  thyroid not enlarged  supple  Respiratory:  no respiratory distress  normal breath sounds  no rales  Cardiovascular:  no jugular venous distention  regular rhythm  apical impulse normal  S1 normal, S2 normal  no S3, no S4   no murmur  no rub, no thrill  carotid pulses normal; no bruit  pedal pulses normal  lower extremity edema: Trace  Skin:   warm, dry      No Known Allergies  Scheduled meds:       acetaminophen, 1,000 mg, Nasogastric, Q8H  aspirin, 325 mg, Nasogastric, Daily  atorvastatin, 40 mg, Nasogastric, Nightly  ceFAZolin, 2,000 mg, Intravenous, Q8H  chlorhexidine, 15 mL, Mouth/Throat, Q12H  gabapentin, 100 mg, Nasogastric, TID  insulin glargine, 10 Units, Subcutaneous, Daily  insulin regular, 2-7 Units, Subcutaneous, Q6H  metoprolol tartrate, 12.5 mg, Nasogastric, Q12H  mupirocin, 1 Application, Each Nare, BID  pantoprazole, 40 mg,  "Intravenous, Q AM  pharmacy consult - MTM, , Not Applicable, Daily  protamine, 50 mg, Intravenous, Once  Scopolamine, 1 patch, Transdermal, Q72H  senna-docusate sodium, 2 tablet, Nasogastric, BID  sodium chloride, 10 mL, Intravenous, Q12H      IV meds:                      dexmedetomidine, 0.2-1.5 mcg/kg/hr  DOBUTamine, 2-20 mcg/kg/min  DOPamine, 2-20 mcg/kg/min  EPINEPHrine, 0.02-0.3 mcg/kg/min  EPINEPHrine, 0.02-0.3 mcg/kg/min, Last Rate: Stopped (01/28/25 1453)  niCARdipine, 5-15 mg/hr  niCARdipine, 5-15 mg/hr  norepinephrine, 0.02-0.3 mcg/kg/min, Last Rate: 0.04 mcg/kg/min (01/29/25 1300)  phenylephrine, 0.5-3 mcg/kg/min      Data Review:  Results from last 7 days   Lab Units 01/29/25 0314 01/28/25  1755 01/28/25  1321 01/27/25  1310   SODIUM mmol/L 145 146* 146* 139   BUN mg/dL 20 18 18 25*   CREATININE mg/dL 1.32* 1.18* 1.06* 1.12*   GLUCOSE mg/dL 191* 197* 127* 102*     Results from last 7 days   Lab Units 01/29/25 0314 01/28/25  1755 01/28/25  1321 01/28/25  1215 01/28/25  1202 01/28/25  0724 01/27/25  1310   WBC 10*3/mm3 11.20* 7.82 5.12  --   --   --  8.12   HEMOGLOBIN g/dL 8.3* 8.0* 8.7*  --   --   --  13.6   HEMOGLOBIN, POC g/dL  --   --   --  7.8* 5.4*   < >  --     < > = values in this interval not displayed.     Results from last 7 days   Lab Units 01/29/25 0314 01/28/25  1321 01/27/25  1310   INR  1.24* 1.43* 0.91     Results from last 7 days   Lab Units 01/29/25 0314 01/27/25  1310   ALT (SGPT) U/L 23 14   AST (SGOT) U/L 49* 15     No results found for: \"DIGOXIN\"   No results found for: \"TSH\"        Invalid input(s): \"LDLCALC\"    Estimated Creatinine Clearance: 30.8 mL/min (A) (by C-G formula based on SCr of 1.32 mg/dL (H)).        Imaging (last 24 hr):   I personally reviewed the most recent chest x-ray and other pertinent imaging studies.  Results for orders placed during the hospital encounter of 01/28/25    XR Chest 1 View    Narrative  XR CHEST 1 VW    Date of Exam: 1/29/2025 12:34 AM " EST    Indication: Post-Op Heart Surgery    Comparison: January 28, 2025    Findings:  ET tube tip is at the level of clavicles. Right IJ line tip is within the superior vena cava. There are bilateral thoracotomy tubes and mediastinal chest tube. There is a nasogastric tube noted with its tip in the area of the stomach. Sternotomy wires  are present. The lungs seem relatively clear. It is equivocal whether there may be a tiny left apical pneumothorax. The heart appears enlarged.    Impression  Impression:  1.Lines and tubes as noted.  2.Questionable tiny left apical pneumothorax.        Electronically Signed: Edenilson Grossman MD  1/29/2025 7:54 AM EST  Workstation ID: [id number]        Last ECHO:  Results for orders placed in visit on 01/28/25    Intra-Op Anesthesia EMRE  1/28/2025    Echocardiogram Comments:  Moderately impaired lv function  EF estimated at 38%  Parker of LV is akinetic/dyskinetic  Nl valvular function  Images recorded of RADHA        PROBLEM LIST:     CAD s/p CABG x 5 1/28/2025    CAD (coronary artery disease)        Initial cardiac assessment: 79-year-old female found to have multivessel CAD at cath in October [facility] went to Dr. Del Castillo for second opinion, status post 5V CABG 1/28/2025, postop EF 38%.    ASSESSMENT/PLAN:  1.  CAD:  Status post 5V CABG  Continue aspirin and statin  Holding metoprolol for low blood pressure currently    2.  Acute on chronic systolic heart failure:  Fairly euvolemic currently, EF on EMRE immediately postop 38%  Unknown what her prior EF was.  Will slowly initiate GDMT, however hypotension and EDGARDO will impede progress.  Transthoracic echo prior to discharge will be ordered.    3.  Acute on CKD:  Maintain adequate perfusing pressure    Avoid nephrotoxic agents   Hold metoprolol for now    4.  Hyperlipidemia:  Goal LDL less than 70, continue atorvastatin 40 mg daily    Discussed with patient's nurse, and patient's family      Sravan Arboleda MD  1/29/2025    13:50 EST

## 2025-01-30 ENCOUNTER — APPOINTMENT (OUTPATIENT)
Dept: GENERAL RADIOLOGY | Facility: HOSPITAL | Age: 80
DRG: 235 | End: 2025-01-30
Payer: MEDICARE

## 2025-01-30 LAB
ANION GAP SERPL CALCULATED.3IONS-SCNC: 14 MMOL/L (ref 5–15)
BUN SERPL-MCNC: 18 MG/DL (ref 8–23)
BUN/CREAT SERPL: 14.3 (ref 7–25)
CALCIUM SPEC-SCNC: 7.9 MG/DL (ref 8.6–10.5)
CHLORIDE SERPL-SCNC: 106 MMOL/L (ref 98–107)
CO2 SERPL-SCNC: 22 MMOL/L (ref 22–29)
CREAT SERPL-MCNC: 1.26 MG/DL (ref 0.57–1)
CYTO UR: NORMAL
DEPRECATED RDW RBC AUTO: 50.4 FL (ref 37–54)
EGFRCR SERPLBLD CKD-EPI 2021: 44.1 ML/MIN/1.73
ERYTHROCYTE [DISTWIDTH] IN BLOOD BY AUTOMATED COUNT: 14.6 % (ref 12.3–15.4)
GLUCOSE BLDC GLUCOMTR-MCNC: 159 MG/DL (ref 70–130)
GLUCOSE BLDC GLUCOMTR-MCNC: 160 MG/DL (ref 70–130)
GLUCOSE BLDC GLUCOMTR-MCNC: 163 MG/DL (ref 70–130)
GLUCOSE BLDC GLUCOMTR-MCNC: 170 MG/DL (ref 70–130)
GLUCOSE SERPL-MCNC: 156 MG/DL (ref 65–99)
HCT VFR BLD AUTO: 24.9 % (ref 34–46.6)
HCT VFR BLD AUTO: 27.2 % (ref 34–46.6)
HGB BLD-MCNC: 7.7 G/DL (ref 12–15.9)
HGB BLD-MCNC: 8.2 G/DL (ref 12–15.9)
LAB AP CASE REPORT: NORMAL
LAB AP CLINICAL INFORMATION: NORMAL
MCH RBC QN AUTO: 29.3 PG (ref 26.6–33)
MCHC RBC AUTO-ENTMCNC: 30.9 G/DL (ref 31.5–35.7)
MCV RBC AUTO: 94.7 FL (ref 79–97)
PATH REPORT.FINAL DX SPEC: NORMAL
PATH REPORT.GROSS SPEC: NORMAL
PLATELET # BLD AUTO: 128 10*3/MM3 (ref 140–450)
PMV BLD AUTO: 11.9 FL (ref 6–12)
POTASSIUM SERPL-SCNC: 3.9 MMOL/L (ref 3.5–5.2)
POTASSIUM SERPL-SCNC: 4.5 MMOL/L (ref 3.5–5.2)
QT INTERVAL: 402 MS
QTC INTERVAL: 460 MS
RBC # BLD AUTO: 2.63 10*6/MM3 (ref 3.77–5.28)
SODIUM SERPL-SCNC: 142 MMOL/L (ref 136–145)
WBC NRBC COR # BLD AUTO: 11.28 10*3/MM3 (ref 3.4–10.8)

## 2025-01-30 PROCEDURE — 63710000001 INSULIN GLARGINE PER 5 UNITS: Performed by: INTERNAL MEDICINE

## 2025-01-30 PROCEDURE — 85018 HEMOGLOBIN: CPT | Performed by: PHYSICIAN ASSISTANT

## 2025-01-30 PROCEDURE — 80048 BASIC METABOLIC PNL TOTAL CA: CPT | Performed by: PHYSICIAN ASSISTANT

## 2025-01-30 PROCEDURE — 25010000002 ONDANSETRON PER 1 MG: Performed by: PHYSICIAN ASSISTANT

## 2025-01-30 PROCEDURE — 85014 HEMATOCRIT: CPT | Performed by: PHYSICIAN ASSISTANT

## 2025-01-30 PROCEDURE — 94799 UNLISTED PULMONARY SVC/PX: CPT

## 2025-01-30 PROCEDURE — 85027 COMPLETE CBC AUTOMATED: CPT | Performed by: PHYSICIAN ASSISTANT

## 2025-01-30 PROCEDURE — 82948 REAGENT STRIP/BLOOD GLUCOSE: CPT

## 2025-01-30 PROCEDURE — 94761 N-INVAS EAR/PLS OXIMETRY MLT: CPT

## 2025-01-30 PROCEDURE — 71045 X-RAY EXAM CHEST 1 VIEW: CPT

## 2025-01-30 PROCEDURE — 25010000002 POTASSIUM CHLORIDE PER 2 MEQ: Performed by: THORACIC SURGERY (CARDIOTHORACIC VASCULAR SURGERY)

## 2025-01-30 PROCEDURE — 84132 ASSAY OF SERUM POTASSIUM: CPT | Performed by: THORACIC SURGERY (CARDIOTHORACIC VASCULAR SURGERY)

## 2025-01-30 PROCEDURE — 93010 ELECTROCARDIOGRAM REPORT: CPT | Performed by: INTERNAL MEDICINE

## 2025-01-30 PROCEDURE — 97162 PT EVAL MOD COMPLEX 30 MIN: CPT

## 2025-01-30 PROCEDURE — 25010000002 CEFAZOLIN PER 500 MG: Performed by: PHYSICIAN ASSISTANT

## 2025-01-30 PROCEDURE — 99233 SBSQ HOSP IP/OBS HIGH 50: CPT | Performed by: INTERNAL MEDICINE

## 2025-01-30 PROCEDURE — 97530 THERAPEUTIC ACTIVITIES: CPT

## 2025-01-30 PROCEDURE — 93005 ELECTROCARDIOGRAM TRACING: CPT | Performed by: PHYSICIAN ASSISTANT

## 2025-01-30 PROCEDURE — 63710000001 INSULIN LISPRO (HUMAN) PER 5 UNITS: Performed by: PHYSICIAN ASSISTANT

## 2025-01-30 PROCEDURE — 99232 SBSQ HOSP IP/OBS MODERATE 35: CPT | Performed by: INTERNAL MEDICINE

## 2025-01-30 RX ORDER — MORPHINE SULFATE 2 MG/ML
2 INJECTION, SOLUTION INTRAMUSCULAR; INTRAVENOUS
Status: DISCONTINUED | OUTPATIENT
Start: 2025-01-30 | End: 2025-02-05 | Stop reason: HOSPADM

## 2025-01-30 RX ORDER — POTASSIUM CHLORIDE 29.8 MG/ML
20 INJECTION INTRAVENOUS ONCE
Status: COMPLETED | OUTPATIENT
Start: 2025-01-30 | End: 2025-01-30

## 2025-01-30 RX ORDER — OXYCODONE HYDROCHLORIDE 5 MG/1
5 TABLET ORAL EVERY 6 HOURS PRN
Status: DISCONTINUED | OUTPATIENT
Start: 2025-01-30 | End: 2025-01-31

## 2025-01-30 RX ORDER — HYDROCODONE BITARTRATE AND ACETAMINOPHEN 7.5; 325 MG/1; MG/1
1 TABLET ORAL EVERY 6 HOURS PRN
Status: DISCONTINUED | OUTPATIENT
Start: 2025-01-30 | End: 2025-01-31

## 2025-01-30 RX ADMIN — ACETAMINOPHEN 1000 MG: 500 TABLET, FILM COATED ORAL at 06:21

## 2025-01-30 RX ADMIN — ACETAMINOPHEN 1000 MG: 500 TABLET, FILM COATED ORAL at 20:19

## 2025-01-30 RX ADMIN — ACETAMINOPHEN 1000 MG: 500 TABLET, FILM COATED ORAL at 15:19

## 2025-01-30 RX ADMIN — OXYCODONE HYDROCHLORIDE 10 MG: 10 TABLET ORAL at 02:12

## 2025-01-30 RX ADMIN — ASPIRIN 325 MG: 325 TABLET ORAL at 08:28

## 2025-01-30 RX ADMIN — GABAPENTIN 100 MG: 100 CAPSULE ORAL at 20:19

## 2025-01-30 RX ADMIN — SENNOSIDES AND DOCUSATE SODIUM 2 TABLET: 50; 8.6 TABLET ORAL at 20:19

## 2025-01-30 RX ADMIN — POTASSIUM CHLORIDE 20 MEQ: 400 INJECTION, SOLUTION INTRAVENOUS at 06:21

## 2025-01-30 RX ADMIN — HYDROCODONE BITARTRATE AND ACETAMINOPHEN 1 TABLET: 7.5; 325 TABLET ORAL at 04:00

## 2025-01-30 RX ADMIN — GABAPENTIN 100 MG: 100 CAPSULE ORAL at 08:28

## 2025-01-30 RX ADMIN — MUPIROCIN 1 APPLICATION: 20 OINTMENT TOPICAL at 08:28

## 2025-01-30 RX ADMIN — INSULIN LISPRO 2 UNITS: 100 INJECTION, SOLUTION INTRAVENOUS; SUBCUTANEOUS at 12:24

## 2025-01-30 RX ADMIN — ONDANSETRON 4 MG: 2 INJECTION INTRAMUSCULAR; INTRAVENOUS at 12:44

## 2025-01-30 RX ADMIN — PANTOPRAZOLE SODIUM 40 MG: 40 INJECTION, POWDER, FOR SOLUTION INTRAVENOUS at 06:21

## 2025-01-30 RX ADMIN — SENNOSIDES AND DOCUSATE SODIUM 2 TABLET: 50; 8.6 TABLET ORAL at 08:28

## 2025-01-30 RX ADMIN — GABAPENTIN 100 MG: 100 CAPSULE ORAL at 15:19

## 2025-01-30 RX ADMIN — INSULIN LISPRO 2 UNITS: 100 INJECTION, SOLUTION INTRAVENOUS; SUBCUTANEOUS at 17:14

## 2025-01-30 RX ADMIN — INSULIN LISPRO 3 UNITS: 100 INJECTION, SOLUTION INTRAVENOUS; SUBCUTANEOUS at 20:19

## 2025-01-30 RX ADMIN — ATORVASTATIN CALCIUM 40 MG: 40 TABLET, FILM COATED ORAL at 20:19

## 2025-01-30 RX ADMIN — CHLORHEXIDINE GLUCONATE 15 ML: 1.2 SOLUTION ORAL at 08:29

## 2025-01-30 RX ADMIN — MUPIROCIN 1 APPLICATION: 20 OINTMENT TOPICAL at 20:19

## 2025-01-30 RX ADMIN — Medication 10 ML: at 20:19

## 2025-01-30 RX ADMIN — SODIUM CHLORIDE 2000 MG: 900 INJECTION INTRAVENOUS at 02:09

## 2025-01-30 RX ADMIN — OXYCODONE HYDROCHLORIDE 10 MG: 10 TABLET ORAL at 08:28

## 2025-01-30 RX ADMIN — Medication 10 ML: at 08:22

## 2025-01-30 RX ADMIN — INSULIN GLARGINE 10 UNITS: 100 INJECTION, SOLUTION SUBCUTANEOUS at 08:28

## 2025-01-30 NOTE — THERAPY EVALUATION
Patient Name: Abena Washington  : 1945    MRN: [id number]                              Today's Date: 2025       Admit Date: 2025    Visit Dx:     ICD-10-CM ICD-9-CM   1. Coronary artery disease involving native coronary artery of native heart with angina pectoris  I25.119 414.01     413.9   2. Coronary artery disease involving native coronary artery of native heart, unspecified whether angina present  I25.10 414.01     Patient Active Problem List   Diagnosis    Nuclear sclerotic cataract of left eye    CAD s/p CABG x 5 2025    CAD (coronary artery disease)     Past Medical History:   Diagnosis Date    Anemia     Anxiety and depression     Arthritis     Cataracts, bilateral     Coronary artery disease     Delayed emergence from anesthesia     Diabetes mellitus     Heart attack     Hyperlipidemia     Hypertension     Nausea     Peripheral neuropathy     PONV (postoperative nausea and vomiting)     Skin melanoma     Wears glasses      Past Surgical History:   Procedure Laterality Date    CARDIAC CATHETERIZATION      CATARACT EXTRACTION W/ INTRAOCULAR LENS IMPLANT Left 2023    Procedure: CATARACT PHACO EXTRACTION WITH INTRAOCULAR LENS IMPLANT LEFT;  Surgeon: Mina Hendrickson MD;  Location: [facility] OR;  Service: Ophthalmology;  Laterality: Left;    CATARACT EXTRACTION W/ INTRAOCULAR LENS IMPLANT Right 2023    Procedure: CATARACT PHACO EXTRACTION WITH INTRAOCULAR LENS IMPLANT RIGHT;  Surgeon: Mina Hendrickson MD;  Location: [facility] OR;  Service: Ophthalmology;  Laterality: Right;    CHOLECYSTECTOMY      COLONOSCOPY      CORONARY ARTERY BYPASS GRAFT N/A 2025    Procedure: MEDIAN STERNOTOMY, CORONARY ARTERY BYPASS GRAFTING X 5, UTILIZING THE LEFT INTERNAL MAMMARY ARTERY, ENDOSCOPIC VEIN HARVESTING OF THE LEFT SAPHENOUS VEIN, TRANSESOPHAGEAL ECHOCARDIOGRAM WITH ANESTHESIA;  Surgeon: Zeferino Del Castillo MD;  Location: [facility] OR;  Service: Cardiothoracic;  Laterality: N/A;    ENDOSCOPY       HYSTERECTOMY      SKIN CANCER EXCISION      TUBAL ABDOMINAL LIGATION        General Information       Row Name 01/30/25 1535          Physical Therapy Time and Intention    Document Type evaluation  -KG     Mode of Treatment physical therapy  -KG       Row Name 01/30/25 1535          General Information    Patient Profile Reviewed yes  -KG     Prior Level of Function --  pt is limited historian; TBA later  -KG     Existing Precautions/Restrictions cardiac;fall;oxygen therapy device and L/min;sternal;other (see comments)  HFNC; non-rebreather for mobility  -KG     Barriers to Rehab medically complex;cognitive status  -KG       Row Name 01/30/25 1535          Living Environment    People in Home --  pt is limited historian; TBA later  -KG       Row Name 01/30/25 1535          Cognition    Orientation Status (Cognition) oriented to;person;place  -KG       Row Name 01/30/25 1535          Safety Issues/Impairments Affecting Functional Mobility    Safety Issues Affecting Function (Mobility) awareness of need for assistance;insight into deficits/self-awareness;safety precaution awareness;safety precautions follow-through/compliance  -KG     Impairments Affecting Function (Mobility) balance;cognition;coordination;endurance/activity tolerance;postural/trunk control;pain;shortness of breath;strength  -KG     Cognitive Impairments, Mobility Safety/Performance awareness, need for assistance;insight into deficits/self-awareness;safety precaution awareness;safety precaution follow-through;sequencing abilities  -KG               User Key  (r) = Recorded By, (t) = Taken By, (c) = Cosigned By      Initials Name Provider Type    KG Patrica Rodriguez, PT Physical Therapist                   Mobility       Row Name 01/30/25 1539          Bed Mobility    Comment, (Bed Mobility) UIC  -KG       Row Name 01/30/25 1539          Transfers    Comment, (Transfers) VC's for sequencing and safe hand placement to maintain sternal  precautions. Pt with francisco knee buckling upon initial stand.  -KG       Row Name 01/30/25 1539          Sit-Stand Transfer    Sit-Stand Lewis (Transfers) moderate assist (50% patient effort);2 person assist;verbal cues  -KG       Row Name 01/30/25 1539          Gait/Stairs (Locomotion)    Lewis Level (Gait) moderate assist (50% patient effort);2 person assist;1 person to manage equipment;verbal cues  chair follow  -KG     Assistive Device (Gait) other (see comments)  B UE support on tripod monitor  -KG     Distance in Feet (Gait) 40  -KG     Deviations/Abnormal Patterns (Gait) bilateral deviations;base of support, narrow;jad decreased;festinating/shuffling;stride length decreased  -KG     Bilateral Gait Deviations forward flexed posture;heel strike decreased;knee buckling bilaterally  -KG     Comment, (Gait/Stairs) Pt demonstrated step to gait pattern with very slow jad and short, shuffled steps. Pt required max cueing for upright posture with forward gaze and to keep eyes open. Pt with multiple episodes of francisco knee buckling. Very unsteady. Required encouragement for continued forward ambulation. Returned to room in chair.  -KG               User Key  (r) = Recorded By, (t) = Taken By, (c) = Cosigned By      Initials Name Provider Type    KG Patrica Rodriguez, PT Physical Therapist                   Obj/Interventions       Row Name 01/30/25 1542          Range of Motion Comprehensive    General Range of Motion no range of motion deficits identified  -KG     Comment, General Range of Motion B LE WFL  -KG       Row Name 01/30/25 1549          Strength Comprehensive (MMT)    Comment, General Manual Muscle Testing (MMT) Assessment B LE grossly 3+/5  -KG       Row Name 01/30/25 1544          Balance    Balance Assessment sitting static balance;standing static balance;standing dynamic balance  -KG     Static Sitting Balance minimal assist  -KG     Position, Sitting Balance supported;sitting in  chair  -KG     Static Standing Balance minimal assist;2-person assist  -KG     Dynamic Standing Balance moderate assist;2-person assist  -KG     Position/Device Used, Standing Balance supported  -KG       Row Name 01/30/25 7503          Sensory Assessment (Somatosensory)    Sensory Assessment (Somatosensory) LE sensation intact  -KG               User Key  (r) = Recorded By, (t) = Taken By, (c) = Cosigned By      Initials Name Provider Type    KG Patrica Rodriguez, PT Physical Therapist                   Goals/Plan       Row Name 01/30/25 8571          Bed Mobility Goal 1 (PT)    Activity/Assistive Device (Bed Mobility Goal 1, PT) sit to supine;supine to sit  -KG     Vernon Level/Cues Needed (Bed Mobility Goal 1, PT) moderate assist (50-74% patient effort)  -KG     Time Frame (Bed Mobility Goal 1, PT) short term goal (STG);5 days  -KG     Progress/Outcomes (Bed Mobility Goal 1, PT) goal ongoing  -KG       Row Name 01/30/25 7531          Transfer Goal 1 (PT)    Activity/Assistive Device (Transfer Goal 1, PT) sit-to-stand/stand-to-sit;bed-to-chair/chair-to-bed  -KG     Vernon Level/Cues Needed (Transfer Goal 1, PT) minimum assist (75% or more patient effort)  -KG     Time Frame (Transfer Goal 1, PT) long term goal (LTG);10 days  -KG     Progress/Outcome (Transfer Goal 1, PT) goal ongoing  -KG       Row Name 01/30/25 2001          Gait Training Goal 1 (PT)    Activity/Assistive Device (Gait Training Goal 1, PT) gait (walking locomotion);assistive device use;walker, rolling  -KG     Vernon Level (Gait Training Goal 1, PT) minimum assist (75% or more patient effort)  -KG     Distance (Gait Training Goal 1, PT) 100 feet  -KG     Time Frame (Gait Training Goal 1, PT) long term goal (LTG);10 days  -KG     Progress/Outcome (Gait Training Goal 1, PT) goal ongoing  -KG       Row Name 01/30/25 3971          Therapy Assessment/Plan (PT)    Planned Therapy Interventions (PT) balance training;bed mobility  training;gait training;strengthening;transfer training  -KG               User Key  (r) = Recorded By, (t) = Taken By, (c) = Cosigned By      Initials Name Provider Type    KG Patrica Rodriguez, PT Physical Therapist                   Clinical Impression       Row Name 01/30/25 1548          Pain    Additional Documentation Pain Scale: FACES Pre/Post-Treatment (Group)  -KG       Row Name 01/30/25 1548          Pain Scale: FACES Pre/Post-Treatment    Pain: FACES Scale, Pretreatment 4-->hurts little more  -KG     Posttreatment Pain Rating 4-->hurts little more  -KG       [facility] Name 01/30/25 1548          Plan of Care Review    Plan of Care Reviewed With patient  -KG     Outcome Evaluation PT initial evaluation completed for pt s/p CABG x5 presenting with generalized weakness, impaired balance and coordination, increased SOA, lethargy, and decreased functional mobility. Pt ambulated 40ft with modA x2 +1 and B UE support on tripod monitor. Chair follow for safety. Pt's decreased independence warrants PT skilled care. Recommend D/C to  rehab facility.  -KG       Row Name 01/30/25 1548          Therapy Assessment/Plan (PT)    Patient/Family Therapy Goals Statement (PT) return to PLOF  -KG     Rehab Potential (PT) good  -KG     Criteria for Skilled Interventions Met (PT) yes;skilled treatment is necessary  -KG     Therapy Frequency (PT) daily  -KG     Predicted Duration of Therapy Intervention (PT) 10 days  -KG       Row Name 01/30/25 1548          Vital Signs    Pre Systolic BP Rehab 114  -KG     Pre Treatment Diastolic BP 49  -KG     Post Systolic BP Rehab 134  -KG     Post Treatment Diastolic BP 82  -KG     Pretreatment Heart Rate (beats/min) 71  -KG     Posttreatment Heart Rate (beats/min) 74  -KG     Pre SpO2 (%) 95  -KG     O2 Delivery Pre Treatment hi-flow  -KG     Intra SpO2 (%) 97  -KG     O2 Delivery Intra Treatment non-rebreather  -KG     Post SpO2 (%) 96  -KG     O2 Delivery Post Treatment hi-flow  -KG      Pre Patient Position Sitting  -KG     Intra Patient Position Standing  -KG     Post Patient Position Sitting  -KG       Row Name 01/30/25 1548          Positioning and Restraints    Pre-Treatment Position sitting in chair/recliner  -KG     Post Treatment Position chair  -KG     In Chair reclined;call light within reach;encouraged to call for assist;with family/caregiver;with nsg;RUE elevated;LUE elevated;legs elevated  -KG               User Key  (r) = Recorded By, (t) = Taken By, (c) = Cosigned By      Initials Name Provider Type    Patrica Piña, PT Physical Therapist                   Outcome Measures       Row Name 01/30/25 1552          How much help from another person do you currently need...    Turning from your back to your side while in flat bed without using bedrails? 2  -KG     Moving from lying on back to sitting on the side of a flat bed without bedrails? 2  -KG     Moving to and from a bed to a chair (including a wheelchair)? 2  -KG     Standing up from a chair using your arms (e.g., wheelchair, bedside chair)? 2  -KG     Climbing 3-5 steps with a railing? 1  -KG     To walk in hospital room? 2  -KG     AM-PAC 6 Clicks Score (PT) 11  -KG     Highest Level of Mobility Goal 4 --> Transfer to chair/commode  -KG       Row Name 01/30/25 1552          Functional Assessment    Outcome Measure Options AM-PAC 6 Clicks Basic Mobility (PT)  -KG               User Key  (r) = Recorded By, (t) = Taken By, (c) = Cosigned By      Initials Name Provider Type    Patrica Piña PT Physical Therapist                                 Physical Therapy Education       Title: PT OT SLP Therapies (In Progress)       Topic: Physical Therapy (In Progress)       Point: Mobility training (In Progress)       Learning Progress Summary            Patient Acceptance, E, NR by KG at 1/30/2025 6892                      Point: Home exercise program (Not Started)       Learner Progress:  Not documented in this visit.               Point: Body mechanics (In Progress)       Learning Progress Summary            Patient Acceptance, E, NR by KG at 1/30/2025 1449                      Point: Precautions (In Progress)       Learning Progress Summary            Patient Acceptance, E, NR by KG at 1/30/2025 1449                                      User Key       Initials Effective Dates Name Provider Type Discipline    KG 05/22/20 -  Patrica Rodriguez, PT Physical Therapist PT                  PT Recommendation and Plan  Planned Therapy Interventions (PT): balance training, bed mobility training, gait training, strengthening, transfer training  Outcome Evaluation: PT initial evaluation completed for pt s/p CABG x5 presenting with generalized weakness, impaired balance and coordination, increased SOA, lethargy, and decreased functional mobility. Pt ambulated 40ft with modA x2 +1 and B UE support on tripod monitor. Chair follow for safety. Pt's decreased independence warrants PT skilled care. Recommend D/C to IP rehab facility.     Time Calculation:   PT Evaluation Complexity  History, PT Evaluation Complexity: 3 or more personal factors and/or comorbidities  Examination of Body Systems (PT Eval Complexity): total of 3 or more elements  Clinical Presentation (PT Evaluation Complexity): evolving  Clinical Decision Making (PT Evaluation Complexity): moderate complexity  Overall Complexity (PT Evaluation Complexity): moderate complexity     PT Charges       Row Name 01/30/25 1449             Time Calculation    Start Time 1449  -KG      PT Received On 01/30/25  -KG      PT Goal Re-Cert Due Date 02/09/25  -KG         Time Calculation- PT    Total Timed Code Minutes- PT 10 minute(s)  -KG         Timed Charges    63887 - PT Therapeutic Activity Minutes 10  -KG         Untimed Charges    PT Eval/Re-eval Minutes 46  -KG         Total Minutes    Timed Charges Total Minutes 10  -KG      Untimed Charges Total Minutes 46  -KG       Total Minutes 56   -KG                User Key  (r) = Recorded By, (t) = Taken By, (c) = Cosigned By      Initials Name Provider Type    Patrica Piña, PT Physical Therapist                  Therapy Charges for Today       Code Description Service Date Service Provider Modifiers Qty    50270693942  PT THERAPEUTIC ACT EA 15 MIN 1/30/2025 Patrica Rodriguez, PT GP 1    05401346319 HC PT THER SUPP EA 15 MIN 1/30/2025 Patrica Rodriguez, PT GP 3    [id number]  PT EVAL MOD COMPLEXITY 4 1/30/2025 Patrica Rodriguez, PT GP 1            PT G-Codes  Outcome Measure Options: AM-PAC 6 Clicks Basic Mobility (PT)  AM-PAC 6 Clicks Score (PT): 11  PT Discharge Summary  Anticipated Discharge Disposition (PT): inpatient rehabilitation facility    Erica Rodriguez, PT  1/30/2025

## 2025-01-30 NOTE — PROGRESS NOTES
Intensive Care Follow-up     Hospital:  LOS: 2 days   Ms. Abena Washington, 79 y.o. female is followed for:   Coronary artery disease involving native coronary artery of native heart   Postoperative mechanical ventilation, hemodynamic, electrolyte monitoring          History of present illness:   79-year-old female with diabetes, hypertension, coronary disease, dyslipidemia recently seen by cardiology team with complaints of intermittent/exertional chest pressure.  Echo showed EF of 45 to 50%.  Underwent stress testing which was abnormal and underwent subsequently left heart cath which showed multivessel coronary disease.  Patient was referred to CT surgery for surgical revascularization.  Patient underwent preop workup which included bilateral carotid duplex which did not show any significant carotid disease.  Spirometry was done but results are not available.  Patient was deemed surgical candidate so underwent coronary bypass graft surgery x 5 by Dr. Del Castillo on 1/28.  Estimated blood loss was 500 mL.  Cross-clamp time of 118 minutes and bypass time of 132 minutes.  Post procedure patient is transferred to ICU.  No other intraoperative complications.  Patient did not require any blood transfusion intraoperatively.     Subjective   Interval History:  Patient was extubated yesterday.  Today morning patient requiring nonrebreather mask.  Otherwise patient is awake and alert and following commands.  Remains on low-dose norepinephrine.  Given dose of Bumex today per CT surgery.                 The patient's past medical, surgical and social history were reviewed and updated in Epic as appropriate.       Objective     Infusions:  niCARdipine, 5-15 mg/hr  norepinephrine, 0.02-0.3 mcg/kg/min, Last Rate: 0.05 mcg/kg/min (01/30/25 1000)      Medications:  acetaminophen, 1,000 mg, Nasogastric, Q8H  aspirin, 325 mg, Nasogastric, Daily  atorvastatin, 40 mg, Nasogastric, Nightly  atropine sulfate, , ,   gabapentin, 100 mg,  "Nasogastric, TID  insulin glargine, 10 Units, Subcutaneous, Daily  insulin lispro, 2-7 Units, Subcutaneous, 4x Daily AC & at Bedtime  metoprolol tartrate, 12.5 mg, Nasogastric, Q12H  mupirocin, 1 Application, Each Nare, BID  pantoprazole, 40 mg, Intravenous, Q AM  pharmacy consult - MTM, , Not Applicable, Daily  Scopolamine, 1 patch, Transdermal, Q72H  senna-docusate sodium, 2 tablet, Nasogastric, BID  sodium chloride, 10 mL, Intravenous, Q12H        Vital Sign Min/Max for last 24 hours  Temp  Min: 98.4 °F (36.9 °C)  Max: 99.1 °F (37.3 °C)   BP  Min: 91/44  Max: 120/60   Pulse  Min: 68  Max: 93   Resp  Min: 10  Max: 18   SpO2  Min: 86 %  Max: 99 %   Flow (L/min) (Oxygen Therapy)  Min: 2  Max: 6       Input/Output for last 24 hour shift  01/29 0701 - 01/30 0700  In: 2160 [P.O.:120; I.V.:1340]  Out: 1915 [Urine:1005]      Objective:  Vital signs: (most recent): Blood pressure 101/58, pulse 68, temperature 98.4 °F (36.9 °C), temperature source Oral, resp. rate 12, height 157.5 cm (62\"), weight 54.7 kg (120 lb 9.5 oz), SpO2 94%.            General Appearance: Awake, alert, in no acute distress   Lungs:   B/L Breath sounds present with decreased breath sounds on bases, no wheezing heard, occ crackles.  Chest tube in place and draining serosanguineous fluid  Heart: S1 and S2 present, no murmur  Abdomen: Soft, nontender, no guarding or rigidity, bowel sounds positive.  Extremities:  + edema, warm to touch.  Neurologic:  Moving all four extremities. Good strength bilaterally.   Psychological: Normal affect, Cooperative      Results from last 7 days   Lab Units 01/30/25  0334 01/29/25  0314 01/28/25  1755   WBC 10*3/mm3 11.28* 11.20* 7.82   HEMOGLOBIN g/dL 7.7* 8.3* 8.0*   PLATELETS 10*3/mm3 128* 160 134*     Results from last 7 days   Lab Units 01/30/25  0334 01/29/25  0314 01/28/25  1755 01/28/25  1321   SODIUM mmol/L 142 145 146* 146*   POTASSIUM mmol/L 3.9 4.0 4.3 4.0   CO2 mmol/L 22.0 26.0 28.0 28.0   BUN mg/dL 18 20 18 " 18   CREATININE mg/dL 1.26* 1.32* 1.18* 1.06*   MAGNESIUM mg/dL  --  2.6* 2.7* 3.1*   PHOSPHORUS mg/dL  --   --  4.1 3.2   GLUCOSE mg/dL 156* 191* 197* 127*     Estimated Creatinine Clearance: 32.3 mL/min (A) (by C-G formula based on SCr of 1.26 mg/dL (H)).    Results from last 7 days   Lab Units 01/29/25  0652   PH, ARTERIAL pH units 7.334*   PCO2, ARTERIAL mm Hg 47.4*   PO2 ART mm Hg 129.0*       Images:   Chest x-ray reviewed and patient s/p median sternotomy.  Patient has cardiomegaly with no significant pulmonary vascular congestion.  Small pleural effusion.  Chest tubes in place and no obvious pneumothorax noted.  Right IJ catheter in good position.    I reviewed the patient's results and images.     Assessment & Plan   Impression        CAD s/p CABG x 5 1/28/2025    CAD (coronary artery disease)       Plan        1.  Patient status post coronary bypass graft surgery.  CT surgery and cardiology following postop course.  Continue aspirin, statin and beta-blockers as tolerated hemodynamically.  Currently hemodynamically unstable requiring norepinephrine.  Patient was given fluid boluses yesterday.  Remains on low-dose norepinephrine.  Continue to wean as tolerated.  2.  Continue chest tube drainage.  No excessive bleeding noted.  Hemoglobin stable overnight.  3.  Urine output is better.  Renal function slightly improved.  Has been ordered dose of Bumex per CT surgery.  Will follow closely.  Decrease IV fluids to KVO 20 mL/h.  4.  Worsening hypoxemic respiratory failure requiring nonrebreather mask.  Patient will be switched over to high flow nasal cannula and will be monitored closely.  Continue with deep breathing exercises with incentive spirometry.  5.  Out of bed and mobilize as tolerated.  6.  Hemoglobin slowly trending down.  Recheck hemoglobin at noon today.  Platelet count dropped we will trend that for now as well.  7.  Discontinue femoral arterial line.  8.  Continue current insulin regimen.  Blood  sugars under okay control.  9.  GI prophylaxis.  SCDs for DVT prophylaxis.  10.  Judicious pain control.    Continue close monitoring in ICU.  Remains at risk of decline from cardiac pulmonary standpoint.  Discussed with CT surgery.      Plan of care and goals reviewed with multidisciplinary/antibiotic stewardship team during rounds.   I discussed the patient's findings and my recommendations with patient and nursing staff       Time spent  38 min  (exclusive of procedure time)  including high complexity decision making to assess, manipulate, and support vital organ system failure in this individual who has impairment of one or more vital organ systems such that there is a high probability of imminent or life threatening deterioration in the patient’s condition.  Above documentation reviewed and reflect accurate information as of 1/30/2025 including copied elements of the note.       Nik Curry MD, [facility]  Pulmonary, Critical care and Sleep Medicine

## 2025-01-30 NOTE — PROGRESS NOTES
CTS Progress Note    POD # 2 s/p CABG x 5     LOS: 2 days   Remains on Levophed 0.05. VSS on 4L NC. Lying in bed with no complaints.    Objective    Vital Signs  Temp:  [98.3 °F (36.8 °C)-99.1 °F (37.3 °C)] 98.6 °F (37 °C)  Heart Rate:  [71-93] 82  Resp:  [10-18] 10  BP: ()/(38-79) 115/58  Arterial Line BP: ()/(34-48) 105/39    Physical Exam:   General Appearance: alert, appears stated age and cooperative.  Voice stronger today   Lungs: Decreased lung sounds francisco   Heart: regular rhythm & normal rate, normal S1, S2 and no murmur, no gallop, no rub   Chest: sternum stable   Skin: Incision c/d/I   CT: #3-150 cc /24 hrs         #1 and #2-410 cc/ 24 hrs     Results     Results from last 7 days   Lab Units 01/30/25  0334   WBC 10*3/mm3 11.28*   HEMOGLOBIN g/dL 7.7*   HEMATOCRIT % 24.9*   PLATELETS 10*3/mm3 128*     Results from last 7 days   Lab Units 01/30/25  0334   SODIUM mmol/L 142   POTASSIUM mmol/L 3.9   CHLORIDE mmol/L 106   CO2 mmol/L 22.0   BUN mg/dL 18   CREATININE mg/dL 1.26*   GLUCOSE mg/dL 156*   CALCIUM mg/dL 7.9*       Imaging Results (Last 24 Hours)       Procedure Component Value Units Date/Time    XR Chest 1 View [[id number]] Resulted: 01/30/25 0203     Updated: 01/30/25 0514    XR Chest 1 View [[id number]] Collected: 01/29/25 0753     Updated: 01/29/25 0758    Narrative:      XR CHEST 1 VW    Date of Exam: 1/29/2025 12:34 AM EST    Indication: Post-Op Heart Surgery    Comparison: January 28, 2025    Findings:  ET tube tip is at the level of clavicles. Right IJ line tip is within the superior vena cava. There are bilateral thoracotomy tubes and mediastinal chest tube. There is a nasogastric tube noted with its tip in the area of the stomach. Sternotomy wires   are present. The lungs seem relatively clear. It is equivocal whether there may be a tiny left apical pneumothorax. The heart appears enlarged.      Impression:      Impression:  1.Lines and tubes as noted.  2.Questionable tiny left  apical pneumothorax.        Electronically Signed: Edenilson Grossman MD    1/29/2025 7:54 AM EST    Workstation ID: [id number]            Assessment  POD # 2 s/p CABG x 5    CAD s/p CABG x 5 1/28/2025    CAD (coronary artery disease)    Plan   D/C mediastinal chest tube. Continue pleural tubes to suction until output decreases.  D/C pacing wires  D/C femoral arterial line and up to chair in 4 hours  Wean oxygen as tolerated  Wean levophed   Monitor H&H-repeat at noon. Decrease is likely dilutional.  Monitor renal function.  Hold nephrotoxic agents. Creatinine improved from yesterday.  Ambulate  Pulmonary toilet    Zeferino Del Castillo MD  01/30/25  07:25 EST

## 2025-01-30 NOTE — PROGRESS NOTES
"[facility] at [facility]  INPATIENT PROGRESS NOTE         [facility]    1/30/2025      PATIENT IDENTIFICATION:   Name:  Abena Washington      MRN:  [id number]     79 y.o.  female             Reason for visit: Chronic systolic heart failure, CAD, hypertension, hyperlipidemia      SUBJECTIVE:   Planning to remove femoral arterial line today, which will outpatient to set up and hopefully get out of bed if able.  States she feels \"okay\", no arrhythmia overnight, still on Levophed, weaned slightly to 0.05     OBJECTIVE:  Vitals:    01/30/25 0600 01/30/25 0615 01/30/25 0630 01/30/25 0710   BP: 114/57  115/58    BP Location: Left arm      Patient Position: Lying      Pulse: 84 82 86 82   Resp: 10      Temp:       TempSrc:       SpO2: 92% 91% 91% 90%   Weight:       Height:         FiO2 (%): 40 %     Body mass index is 22.06 kg/m².    Intake/Output Summary (Last 24 hours) at 1/30/2025 0849  Last data filed at 1/30/2025 0600  Gross per 24 hour   Intake 2160 ml   Output 1670 ml   Net 490 ml       Telemetry: Personally reviewed, normal sinus rhythm, no arrhythmia     Exam:  General Appearance:   · well developed  · well nourished, somewhat frail-appearing  Neck:  · thyroid not enlarged  · supple  Respiratory:  · no respiratory distress  · normal breath sounds  · no rales  Cardiovascular:  · no jugular venous distention  · regular rhythm  · apical impulse normal  · S1 normal, S2 normal  · no S3, no S4   · no murmur  · Positive rub, no thrill  · carotid pulses normal; no bruit  · pedal pulses normal  · lower extremity edema: Trace  Skin:   warm, dry        No Known Allergies  Scheduled meds:       atropine sulfate, , ,   acetaminophen, 1,000 mg, Nasogastric, Q8H  aspirin, 325 mg, Nasogastric, Daily  atorvastatin, 40 mg, Nasogastric, Nightly  chlorhexidine, 15 mL, Mouth/Throat, Q12H  gabapentin, 100 mg, Nasogastric, TID  insulin glargine, 10 Units, Subcutaneous, Daily  insulin lispro, 2-7 " "Units, Subcutaneous, 4x Daily AC & at Bedtime  metoprolol tartrate, 12.5 mg, Nasogastric, Q12H  mupirocin, 1 Application, Each Nare, BID  pantoprazole, 40 mg, Intravenous, Q AM  pharmacy consult - MTM, , Not Applicable, Daily  protamine, 50 mg, Intravenous, Once  Scopolamine, 1 patch, Transdermal, Q72H  senna-docusate sodium, 2 tablet, Nasogastric, BID  sodium chloride, 10 mL, Intravenous, Q12H      IV meds:                      dexmedetomidine, 0.2-1.5 mcg/kg/hr  DOBUTamine, 2-20 mcg/kg/min  DOPamine, 2-20 mcg/kg/min  EPINEPHrine, 0.02-0.3 mcg/kg/min  EPINEPHrine, 0.02-0.3 mcg/kg/min, Last Rate: Stopped (01/28/25 1453)  niCARdipine, 5-15 mg/hr  niCARdipine, 5-15 mg/hr  norepinephrine, 0.02-0.3 mcg/kg/min, Last Rate: 0.05 mcg/kg/min (01/30/25 0628)  phenylephrine, 0.5-3 mcg/kg/min      Data Review:  Results from last 7 days   Lab Units 01/30/25  0334 01/29/25  0314 01/28/25  1755 01/28/25  1321   SODIUM mmol/L 142 145 146* 146*   BUN mg/dL 18 20 18 18   CREATININE mg/dL 1.26* 1.32* 1.18* 1.06*   GLUCOSE mg/dL 156* 191* 197* 127*     Results from last 7 days   Lab Units 01/30/25  0334 01/29/25  0314 01/28/25  1755 01/28/25  1321 01/28/25  1215 01/28/25  0724 01/27/25  1310   WBC 10*3/mm3 11.28* 11.20* 7.82 5.12  --   --  8.12   HEMOGLOBIN g/dL 7.7* 8.3* 8.0* 8.7*  --   --  13.6   HEMOGLOBIN, POC g/dL  --   --   --   --  7.8*   < >  --     < > = values in this interval not displayed.     Results from last 7 days   Lab Units 01/29/25 0314 01/28/25  1321 01/27/25  1310   INR  1.24* 1.43* 0.91     Results from last 7 days   Lab Units 01/29/25  0314 01/27/25  1310   ALT (SGPT) U/L 23 14   AST (SGOT) U/L 49* 15     No results found for: \"DIGOXIN\"   No results found for: \"TSH\"        Invalid input(s): \"LDLCALC\"    Estimated Creatinine Clearance: 32.3 mL/min (A) (by C-G formula based on SCr of 1.26 mg/dL (H)).        Imaging (last 24 hr):   I personally reviewed the most recent chest x-ray and other pertinent imaging " studies.  Results for orders placed during the hospital encounter of 01/28/25    XR Chest 1 View    Narrative  XR CHEST 1 VW    Date of Exam: 1/30/2025 2:03 AM EST    Indication: Post-Op Heart Surgery    Comparison: 1/29/2025    Findings:  Interval removal of endotracheal and gastric tubes. Right IJ introducer remains in place with pulmonary artery catheter positioned at the cavoatrial junction. Mediastinal drain and bilateral chest tubes remain in place. No pneumothorax. Heart size and  pulmonary vasculature appear stable. No gross pulmonary edema or sizable pleural effusion. Atelectasis present in the lung bases    Impression  Impression:    1. Bibasilar atelectasis.  2. No pneumothorax or gross pulmonary edema      Electronically Signed: Evan Hughes  1/30/2025 8:18 AM EST  Workstation ID: [id number]        Last ECHO:  Results for orders placed in visit on 01/28/25    Intra-Op Anesthesia EMRE  1/28/2025    Echocardiogram Comments:  Moderately impaired lv function  EF estimated at 38%  [place] of LV is akinetic/dyskinetic  Nl valvular function  Images recorded of RADHA        PROBLEM LIST:     CAD s/p CABG x 5 1/28/2025    CAD (coronary artery disease)        Initial cardiac assessment: 79-year-old female found to have multivessel CAD at cath in October [facility] went to Dr. Del Castillo for second opinion, status post 5V CABG 1/28/2025, postop EF 38%.    ASSESSMENT/PLAN:  1.  CAD:  Status post 5V CABG  Continue aspirin and statin  Continue holding metoprolol until weaned off Levophed  She may need midodrine for low blood pressure    Encouraged ambulation with assistance.    2.  Acute on chronic systolic heart failure:  Fairly euvolemic currently, EF on EMRE immediately postop 38%  Unknown what her prior EF was.  Will slowly initiate GDMT, however hypotension and EDGARDO will impede progress.  Transthoracic echo on 1/31/2025 will be ordered  If renal function returns to baseline, SGLT2 inhibitor would be a good choice prior to  discharge.    3.  Acute on CKD:  Maintain adequate perfusing pressure    Improving 1/3/2025 with albumin given on Wednesday and blood pressure support.    4.  Hyperlipidemia:  Goal LDL less than 70, continue atorvastatin 40 mg daily    Discussed with patient's nurse      Sravan Arboleda MD  1/30/2025    08:49 EST

## 2025-01-30 NOTE — PLAN OF CARE
Goal Outcome Evaluation:  Plan of Care Reviewed With: patient           Outcome Evaluation: PT initial evaluation completed for pt s/p CABG x5 presenting with generalized weakness, impaired balance and coordination, increased SOA, lethargy, and decreased functional mobility. Pt ambulated 40ft with modA x2 +1 and B UE support on tripod monitor. Chair follow for safety. Pt's decreased independence warrants PT skilled care. Recommend D/C to IP rehab facility.    Anticipated Discharge Disposition (PT): inpatient rehabilitation facility

## 2025-01-30 NOTE — PLAN OF CARE
Goal Outcome Evaluation:      1. Neuro-  Ms Felix was oriented but drowsy for most of shift.  She was able to ambulate hallway 40 ft.    2. Respiratory-  Hi Ignacio Nasal Cannula required to keep oxygen saturation greater than 92%.  MT d/c'd.  Pleural tubes remain.  Drainage 280 ml    3. Cardiac-  Sinus rhythm ( HR 70-80 bpm) with SBP  mmHg.  Levophed drip @ 0.04 mcg/kg/min.    4.  GI-  Ate 50-75% of meals.    5. -  Urine output 330 ml

## 2025-01-31 ENCOUNTER — APPOINTMENT (OUTPATIENT)
Dept: CARDIOLOGY | Facility: HOSPITAL | Age: 80
DRG: 235 | End: 2025-01-31
Payer: MEDICARE

## 2025-01-31 ENCOUNTER — APPOINTMENT (OUTPATIENT)
Dept: GENERAL RADIOLOGY | Facility: HOSPITAL | Age: 80
DRG: 235 | End: 2025-01-31
Payer: MEDICARE

## 2025-01-31 LAB
ABO GROUP BLD: NORMAL
ANION GAP SERPL CALCULATED.3IONS-SCNC: 9 MMOL/L (ref 5–15)
AV MEAN PRESS GRAD SYS DOP V1V2: 3 MMHG
AV VMAX SYS DOP: 118 CM/SEC
BASOPHILS # BLD AUTO: 0.04 10*3/MM3 (ref 0–0.2)
BASOPHILS NFR BLD AUTO: 0.4 % (ref 0–1.5)
BH BB BLOOD EXPIRATION DATE: NORMAL
BH BB BLOOD EXPIRATION DATE: NORMAL
BH BB BLOOD TYPE BARCODE: 6200
BH BB BLOOD TYPE BARCODE: 6200
BH BB DISPENSE STATUS: NORMAL
BH BB DISPENSE STATUS: NORMAL
BH BB PRODUCT CODE: NORMAL
BH BB PRODUCT CODE: NORMAL
BH BB UNIT NUMBER: NORMAL
BH BB UNIT NUMBER: NORMAL
BH CV ECHO MEAS - AO MAX PG: 5.6 MMHG
BH CV ECHO MEAS - AO ROOT DIAM: 3 CM
BH CV ECHO MEAS - AO V2 VTI: 23.1 CM
BH CV ECHO MEAS - AVA(I,D): 2.24 CM2
BH CV ECHO MEAS - EDV(CUBED): 91.1 ML
BH CV ECHO MEAS - EDV(MOD-SP2): 114 ML
BH CV ECHO MEAS - EDV(MOD-SP4): 88.6 ML
BH CV ECHO MEAS - EF(MOD-SP2): 49.2 %
BH CV ECHO MEAS - EF(MOD-SP4): 36.2 %
BH CV ECHO MEAS - ESV(CUBED): 29.8 ML
BH CV ECHO MEAS - ESV(MOD-SP2): 57.9 ML
BH CV ECHO MEAS - ESV(MOD-SP4): 56.5 ML
BH CV ECHO MEAS - FS: 31.1 %
BH CV ECHO MEAS - IVS/LVPW: 1 CM
BH CV ECHO MEAS - IVSD: 1 CM
BH CV ECHO MEAS - LA DIMENSION: 3.7 CM
BH CV ECHO MEAS - LAT PEAK E' VEL: 9 CM/SEC
BH CV ECHO MEAS - LV MASS(C)D: 153.3 GRAMS
BH CV ECHO MEAS - LV MAX PG: 2.7 MMHG
BH CV ECHO MEAS - LV MEAN PG: 1 MMHG
BH CV ECHO MEAS - LV V1 MAX: 81.8 CM/SEC
BH CV ECHO MEAS - LV V1 VTI: 16.5 CM
BH CV ECHO MEAS - LVIDD: 4.5 CM
BH CV ECHO MEAS - LVIDS: 3.1 CM
BH CV ECHO MEAS - LVOT AREA: 3.1 CM2
BH CV ECHO MEAS - LVOT DIAM: 2 CM
BH CV ECHO MEAS - LVPWD: 1 CM
BH CV ECHO MEAS - MED PEAK E' VEL: 5.6 CM/SEC
BH CV ECHO MEAS - MV A MAX VEL: 98 CM/SEC
BH CV ECHO MEAS - MV DEC TIME: 0.22 SEC
BH CV ECHO MEAS - MV E MAX VEL: 98 CM/SEC
BH CV ECHO MEAS - MV E/A: 1
BH CV ECHO MEAS - MV MAX PG: 6.6 MMHG
BH CV ECHO MEAS - MV MEAN PG: 3 MMHG
BH CV ECHO MEAS - MV V2 VTI: 28.9 CM
BH CV ECHO MEAS - MVA(VTI): 1.79 CM2
BH CV ECHO MEAS - RAP SYSTOLE: 3 MMHG
BH CV ECHO MEAS - RVSP: 22 MMHG
BH CV ECHO MEAS - SV(LVOT): 51.7 ML
BH CV ECHO MEAS - SV(MOD-SP2): 56.1 ML
BH CV ECHO MEAS - SV(MOD-SP4): 32.1 ML
BH CV ECHO MEAS - TAPSE (>1.6): 1.23 CM
BH CV ECHO MEAS - TR MAX PG: 18.9 MMHG
BH CV ECHO MEAS - TR MAX VEL: 216.6 CM/SEC
BH CV ECHO MEASUREMENTS AVERAGE E/E' RATIO: 13.42
BH CV XLRA - RV BASE: 3.3 CM
BH CV XLRA - RV LENGTH: 5.7 CM
BH CV XLRA - RV MID: 2.5 CM
BH CV XLRA - TDI S': 9.6 CM/SEC
BLD GP AB SCN SERPL QL: NEGATIVE
BUN SERPL-MCNC: 22 MG/DL (ref 8–23)
BUN/CREAT SERPL: 21.8 (ref 7–25)
CALCIUM SPEC-SCNC: 8 MG/DL (ref 8.6–10.5)
CHLORIDE SERPL-SCNC: 106 MMOL/L (ref 98–107)
CO2 SERPL-SCNC: 24 MMOL/L (ref 22–29)
CREAT SERPL-MCNC: 1.01 MG/DL (ref 0.57–1)
CROSSMATCH INTERPRETATION: NORMAL
CROSSMATCH INTERPRETATION: NORMAL
DEPRECATED RDW RBC AUTO: 50.5 FL (ref 37–54)
EGFRCR SERPLBLD CKD-EPI 2021: 57.5 ML/MIN/1.73
EOSINOPHIL # BLD AUTO: 0.04 10*3/MM3 (ref 0–0.4)
EOSINOPHIL NFR BLD AUTO: 0.4 % (ref 0.3–6.2)
ERYTHROCYTE [DISTWIDTH] IN BLOOD BY AUTOMATED COUNT: 14.6 % (ref 12.3–15.4)
GLUCOSE BLDC GLUCOMTR-MCNC: 162 MG/DL (ref 70–130)
GLUCOSE BLDC GLUCOMTR-MCNC: 220 MG/DL (ref 70–130)
GLUCOSE BLDC GLUCOMTR-MCNC: 229 MG/DL (ref 70–130)
GLUCOSE BLDC GLUCOMTR-MCNC: 240 MG/DL (ref 70–130)
GLUCOSE BLDC GLUCOMTR-MCNC: 263 MG/DL (ref 70–130)
GLUCOSE SERPL-MCNC: 149 MG/DL (ref 65–99)
HCT VFR BLD AUTO: 24.5 % (ref 34–46.6)
HGB BLD-MCNC: 7.6 G/DL (ref 12–15.9)
IMM GRANULOCYTES # BLD AUTO: 0.05 10*3/MM3 (ref 0–0.05)
IMM GRANULOCYTES NFR BLD AUTO: 0.4 % (ref 0–0.5)
LEFT ATRIUM VOLUME INDEX: 29.2 ML/M2
LV EF BIPLANE MOD: 43.7 %
LYMPHOCYTES # BLD AUTO: 1.47 10*3/MM3 (ref 0.7–3.1)
LYMPHOCYTES NFR BLD AUTO: 13.2 % (ref 19.6–45.3)
MAGNESIUM SERPL-MCNC: 2.2 MG/DL (ref 1.6–2.4)
MCH RBC QN AUTO: 29.3 PG (ref 26.6–33)
MCHC RBC AUTO-ENTMCNC: 31 G/DL (ref 31.5–35.7)
MCV RBC AUTO: 94.6 FL (ref 79–97)
MONOCYTES # BLD AUTO: 0.73 10*3/MM3 (ref 0.1–0.9)
MONOCYTES NFR BLD AUTO: 6.6 % (ref 5–12)
NEUTROPHILS NFR BLD AUTO: 79 % (ref 42.7–76)
NEUTROPHILS NFR BLD AUTO: 8.79 10*3/MM3 (ref 1.7–7)
NRBC BLD AUTO-RTO: 0 /100 WBC (ref 0–0.2)
PHOSPHATE SERPL-MCNC: 2.3 MG/DL (ref 2.5–4.5)
PLATELET # BLD AUTO: 134 10*3/MM3 (ref 140–450)
PMV BLD AUTO: 11.8 FL (ref 6–12)
POTASSIUM SERPL-SCNC: 3.9 MMOL/L (ref 3.5–5.2)
POTASSIUM SERPL-SCNC: 4.2 MMOL/L (ref 3.5–5.2)
RBC # BLD AUTO: 2.59 10*6/MM3 (ref 3.77–5.28)
RH BLD: POSITIVE
SODIUM SERPL-SCNC: 139 MMOL/L (ref 136–145)
T&S EXPIRATION DATE: NORMAL
UNIT  ABO: NORMAL
UNIT  ABO: NORMAL
UNIT  RH: NORMAL
UNIT  RH: NORMAL
WBC NRBC COR # BLD AUTO: 11.12 10*3/MM3 (ref 3.4–10.8)

## 2025-01-31 PROCEDURE — 99232 SBSQ HOSP IP/OBS MODERATE 35: CPT | Performed by: INTERNAL MEDICINE

## 2025-01-31 PROCEDURE — 94799 UNLISTED PULMONARY SVC/PX: CPT

## 2025-01-31 PROCEDURE — 86900 BLOOD TYPING SEROLOGIC ABO: CPT | Performed by: THORACIC SURGERY (CARDIOTHORACIC VASCULAR SURGERY)

## 2025-01-31 PROCEDURE — 84132 ASSAY OF SERUM POTASSIUM: CPT | Performed by: THORACIC SURGERY (CARDIOTHORACIC VASCULAR SURGERY)

## 2025-01-31 PROCEDURE — 94761 N-INVAS EAR/PLS OXIMETRY MLT: CPT

## 2025-01-31 PROCEDURE — 86901 BLOOD TYPING SEROLOGIC RH(D): CPT | Performed by: THORACIC SURGERY (CARDIOTHORACIC VASCULAR SURGERY)

## 2025-01-31 PROCEDURE — 25010000002 SULFUR HEXAFLUORIDE MICROSPH 60.7-25 MG RECONSTITUTED SUSPENSION: Performed by: INTERNAL MEDICINE

## 2025-01-31 PROCEDURE — 83735 ASSAY OF MAGNESIUM: CPT | Performed by: INTERNAL MEDICINE

## 2025-01-31 PROCEDURE — 63710000001 INSULIN LISPRO (HUMAN) PER 5 UNITS: Performed by: PHYSICIAN ASSISTANT

## 2025-01-31 PROCEDURE — 63710000001 INSULIN GLARGINE PER 5 UNITS: Performed by: INTERNAL MEDICINE

## 2025-01-31 PROCEDURE — 97165 OT EVAL LOW COMPLEX 30 MIN: CPT

## 2025-01-31 PROCEDURE — 25010000002 POTASSIUM CHLORIDE PER 2 MEQ: Performed by: THORACIC SURGERY (CARDIOTHORACIC VASCULAR SURGERY)

## 2025-01-31 PROCEDURE — 71045 X-RAY EXAM CHEST 1 VIEW: CPT

## 2025-01-31 PROCEDURE — 25010000002 BUMETANIDE PER 0.5 MG: Performed by: THORACIC SURGERY (CARDIOTHORACIC VASCULAR SURGERY)

## 2025-01-31 PROCEDURE — 93306 TTE W/DOPPLER COMPLETE: CPT

## 2025-01-31 PROCEDURE — 84100 ASSAY OF PHOSPHORUS: CPT | Performed by: INTERNAL MEDICINE

## 2025-01-31 PROCEDURE — 97530 THERAPEUTIC ACTIVITIES: CPT

## 2025-01-31 PROCEDURE — 82948 REAGENT STRIP/BLOOD GLUCOSE: CPT

## 2025-01-31 PROCEDURE — 86850 RBC ANTIBODY SCREEN: CPT | Performed by: THORACIC SURGERY (CARDIOTHORACIC VASCULAR SURGERY)

## 2025-01-31 PROCEDURE — 25010000002 ONDANSETRON PER 1 MG: Performed by: PHYSICIAN ASSISTANT

## 2025-01-31 PROCEDURE — 80048 BASIC METABOLIC PNL TOTAL CA: CPT | Performed by: PHYSICIAN ASSISTANT

## 2025-01-31 PROCEDURE — 85025 COMPLETE CBC W/AUTO DIFF WBC: CPT | Performed by: INTERNAL MEDICINE

## 2025-01-31 PROCEDURE — 93306 TTE W/DOPPLER COMPLETE: CPT | Performed by: INTERNAL MEDICINE

## 2025-01-31 RX ORDER — PROCHLORPERAZINE MALEATE 10 MG
10 TABLET ORAL EVERY 6 HOURS PRN
Status: DISCONTINUED | OUTPATIENT
Start: 2025-01-31 | End: 2025-02-05 | Stop reason: HOSPADM

## 2025-01-31 RX ORDER — BISACODYL 5 MG/1
5 TABLET, DELAYED RELEASE ORAL DAILY PRN
Status: DISCONTINUED | OUTPATIENT
Start: 2025-01-31 | End: 2025-02-05 | Stop reason: HOSPADM

## 2025-01-31 RX ORDER — POLYETHYLENE GLYCOL 3350 17 G/17G
17 POWDER, FOR SOLUTION ORAL DAILY PRN
Status: DISCONTINUED | OUTPATIENT
Start: 2025-01-31 | End: 2025-02-05 | Stop reason: HOSPADM

## 2025-01-31 RX ORDER — ACETAMINOPHEN 500 MG
1000 TABLET ORAL EVERY 8 HOURS
Status: DISCONTINUED | OUTPATIENT
Start: 2025-01-31 | End: 2025-02-05 | Stop reason: HOSPADM

## 2025-01-31 RX ORDER — ATORVASTATIN CALCIUM 40 MG/1
40 TABLET, FILM COATED ORAL NIGHTLY
Status: DISCONTINUED | OUTPATIENT
Start: 2025-01-31 | End: 2025-02-05 | Stop reason: HOSPADM

## 2025-01-31 RX ORDER — HYDROCODONE BITARTRATE AND ACETAMINOPHEN 7.5; 325 MG/1; MG/1
1 TABLET ORAL EVERY 6 HOURS PRN
Status: DISCONTINUED | OUTPATIENT
Start: 2025-01-31 | End: 2025-02-05 | Stop reason: HOSPADM

## 2025-01-31 RX ORDER — AMOXICILLIN 250 MG
2 CAPSULE ORAL 2 TIMES DAILY
Status: DISCONTINUED | OUTPATIENT
Start: 2025-01-31 | End: 2025-02-05 | Stop reason: HOSPADM

## 2025-01-31 RX ORDER — BISACODYL 10 MG
10 SUPPOSITORY, RECTAL RECTAL DAILY PRN
Status: DISCONTINUED | OUTPATIENT
Start: 2025-01-31 | End: 2025-02-05 | Stop reason: HOSPADM

## 2025-01-31 RX ORDER — OXYCODONE HYDROCHLORIDE 5 MG/1
5 TABLET ORAL EVERY 6 HOURS PRN
Status: ACTIVE | OUTPATIENT
Start: 2025-01-31 | End: 2025-02-02

## 2025-01-31 RX ORDER — GABAPENTIN 100 MG/1
100 CAPSULE ORAL 3 TIMES DAILY
Status: DISCONTINUED | OUTPATIENT
Start: 2025-01-31 | End: 2025-02-05 | Stop reason: HOSPADM

## 2025-01-31 RX ORDER — BUMETANIDE 0.25 MG/ML
2 INJECTION, SOLUTION INTRAMUSCULAR; INTRAVENOUS ONCE
Status: COMPLETED | OUTPATIENT
Start: 2025-01-31 | End: 2025-01-31

## 2025-01-31 RX ORDER — ASPIRIN 325 MG
325 TABLET ORAL DAILY
Status: DISCONTINUED | OUTPATIENT
Start: 2025-02-01 | End: 2025-02-05 | Stop reason: HOSPADM

## 2025-01-31 RX ORDER — POTASSIUM CHLORIDE 29.8 MG/ML
20 INJECTION INTRAVENOUS ONCE
Status: COMPLETED | OUTPATIENT
Start: 2025-01-31 | End: 2025-01-31

## 2025-01-31 RX ADMIN — POTASSIUM CHLORIDE 20 MEQ: 400 INJECTION, SOLUTION INTRAVENOUS at 07:53

## 2025-01-31 RX ADMIN — PANTOPRAZOLE SODIUM 40 MG: 40 INJECTION, POWDER, FOR SOLUTION INTRAVENOUS at 06:32

## 2025-01-31 RX ADMIN — Medication 10 ML: at 08:02

## 2025-01-31 RX ADMIN — ONDANSETRON 4 MG: 2 INJECTION INTRAMUSCULAR; INTRAVENOUS at 09:03

## 2025-01-31 RX ADMIN — INSULIN LISPRO 3 UNITS: 100 INJECTION, SOLUTION INTRAVENOUS; SUBCUTANEOUS at 20:28

## 2025-01-31 RX ADMIN — Medication 10 ML: at 20:29

## 2025-01-31 RX ADMIN — INSULIN GLARGINE 10 UNITS: 100 INJECTION, SOLUTION SUBCUTANEOUS at 08:02

## 2025-01-31 RX ADMIN — ACETAMINOPHEN 1000 MG: 500 TABLET, FILM COATED ORAL at 06:32

## 2025-01-31 RX ADMIN — GABAPENTIN 100 MG: 100 CAPSULE ORAL at 20:28

## 2025-01-31 RX ADMIN — MUPIROCIN 1 APPLICATION: 20 OINTMENT TOPICAL at 08:01

## 2025-01-31 RX ADMIN — GABAPENTIN 100 MG: 100 CAPSULE ORAL at 08:02

## 2025-01-31 RX ADMIN — BUMETANIDE 2 MG: 0.25 INJECTION INTRAMUSCULAR; INTRAVENOUS at 07:53

## 2025-01-31 RX ADMIN — ACETAMINOPHEN 1000 MG: 500 TABLET, FILM COATED ORAL at 20:28

## 2025-01-31 RX ADMIN — SULFUR HEXAFLUORIDE 3 ML: KIT at 16:41

## 2025-01-31 RX ADMIN — INSULIN LISPRO 4 UNITS: 100 INJECTION, SOLUTION INTRAVENOUS; SUBCUTANEOUS at 13:33

## 2025-01-31 RX ADMIN — INSULIN LISPRO 2 UNITS: 100 INJECTION, SOLUTION INTRAVENOUS; SUBCUTANEOUS at 07:58

## 2025-01-31 RX ADMIN — MUPIROCIN 1 APPLICATION: 20 OINTMENT TOPICAL at 20:28

## 2025-01-31 RX ADMIN — ACETAMINOPHEN 1000 MG: 500 TABLET, FILM COATED ORAL at 13:34

## 2025-01-31 RX ADMIN — SENNOSIDES AND DOCUSATE SODIUM 2 TABLET: 50; 8.6 TABLET ORAL at 08:01

## 2025-01-31 RX ADMIN — ASPIRIN 325 MG: 325 TABLET ORAL at 08:01

## 2025-01-31 RX ADMIN — ATORVASTATIN CALCIUM 40 MG: 40 TABLET, FILM COATED ORAL at 20:54

## 2025-01-31 RX ADMIN — Medication 12.5 MG: at 20:28

## 2025-01-31 RX ADMIN — INSULIN LISPRO 3 UNITS: 100 INJECTION, SOLUTION INTRAVENOUS; SUBCUTANEOUS at 18:02

## 2025-01-31 NOTE — PLAN OF CARE
Goal Outcome Evaluation:  Plan of Care Reviewed With: patient        Progress: improving  Outcome Evaluation: OT eval complete. Pt presents below baseline function with decreased strength, endurance, ADL performance, and balance deficits.VC to maintain sternal precautions. Pt feeling nauseous this session. Mod A progressing min A with face washing. Recommend IPOT POC and home with HH at D/C.    Anticipated Discharge Disposition (OT): home with home health

## 2025-01-31 NOTE — CASE MANAGEMENT/SOCIAL WORK
Continued Stay Note  [facility]     Patient Name: Abena Washington  MRN: [id number]  Today's Date: 1/31/2025    Admit Date: 1/28/2025    Plan: TBD   Discharge Plan       Row Name 01/31/25 1404       Plan    Plan TBD    Patient/Family in Agreement with Plan yes    Plan Comments Spoke with patient at bedside with no family present about therapy recs for HH with patient agreeable. Referrals faxed to 2 facilities in pt area with address given for daughters house per pt request as she states that is where she will be going. CM will cont to follow.                   Discharge Codes    No documentation.                       Anna Muñoz RN

## 2025-01-31 NOTE — PROGRESS NOTES
[facility] at [facility]  INPATIENT PROGRESS NOTE         [facility]    1/31/2025      PATIENT IDENTIFICATION:   Name:  Abena Washington      MRN:  [id number]     79 y.o.  female             Reason for visit: Chronic systolic heart failure, CAD, hypertension, hyperlipidemia      SUBJECTIVE:   She is doing somewhat better today, up in chair, family at bedside, no new complaints.     OBJECTIVE:  Vitals:    01/31/25 0830 01/31/25 0900 01/31/25 1000 01/31/25 1100   BP: 147/71 132/57 108/54 110/51   BP Location:   Left arm    Patient Position:   Sitting    Pulse: 85  89 86   Resp:   18    Temp:       TempSrc:       SpO2: 94%  94% 95%   Weight:       Height:         FiO2 (%): 40 %     Body mass index is 22.06 kg/m².    Intake/Output Summary (Last 24 hours) at 1/31/2025 1225  Last data filed at 1/31/2025 1000  Gross per 24 hour   Intake 999 ml   Output 1475 ml   Net -476 ml       Telemetry: Personally reviewed, normal sinus rhythm, no arrhythmia     Exam:  General Appearance:   · well developed  · well nourished  Neck:  · thyroid not enlarged  · supple  Respiratory:  · no respiratory distress  · normal breath sounds  · no rales  Cardiovascular:  · no jugular venous distention  · regular rhythm  · apical impulse normal  · S1 normal, S2 normal  · no S3, no S4   · no murmur  · no rub, no thrill  · carotid pulses normal; no bruit  · pedal pulses normal  · lower extremity edema: Trace to 1+  Skin:   warm, dry          No Known Allergies  Scheduled meds:       acetaminophen, 1,000 mg, Nasogastric, Q8H  aspirin, 325 mg, Nasogastric, Daily  atorvastatin, 40 mg, Nasogastric, Nightly  gabapentin, 100 mg, Nasogastric, TID  insulin glargine, 10 Units, Subcutaneous, Daily  insulin lispro, 2-7 Units, Subcutaneous, 4x Daily AC & at Bedtime  metoprolol tartrate, 12.5 mg, Nasogastric, Q12H  mupirocin, 1 Application, Each Nare, BID  pantoprazole, 40 mg, Intravenous, Q AM  pharmacy consult - MTM, ,  "Not Applicable, Daily  Scopolamine, 1 patch, Transdermal, Q72H  senna-docusate sodium, 2 tablet, Nasogastric, BID  sodium chloride, 10 mL, Intravenous, Q12H      IV meds:                      niCARdipine, 5-15 mg/hr  norepinephrine, 0.02-0.3 mcg/kg/min, Last Rate: Stopped (01/31/25 0630)      Data Review:  Results from last 7 days   Lab Units 01/31/25  0600 01/30/25  0334 01/29/25  0314 01/28/25  1755   SODIUM mmol/L 139 142 145 146*   BUN mg/dL 22 18 20 18   CREATININE mg/dL 1.01* 1.26* 1.32* 1.18*   GLUCOSE mg/dL 149* 156* 191* 197*     Results from last 7 days   Lab Units 01/31/25  0600 01/30/25  1224 01/30/25  0334 01/29/25  0314 01/28/25  1755 01/28/25  1321   WBC 10*3/mm3 11.12*  --  11.28* 11.20* 7.82 5.12   HEMOGLOBIN g/dL 7.6* 8.2* 7.7* 8.3* 8.0* 8.7*     Results from last 7 days   Lab Units 01/29/25  0314 01/28/25  1321 01/27/25  1310   INR  1.24* 1.43* 0.91     Results from last 7 days   Lab Units 01/29/25  0314 01/27/25  1310   ALT (SGPT) U/L 23 14   AST (SGOT) U/L 49* 15     No results found for: \"DIGOXIN\"   No results found for: \"TSH\"        Invalid input(s): \"LDLCALC\"    Estimated Creatinine Clearance: 40.3 mL/min (A) (by C-G formula based on SCr of 1.01 mg/dL (H)).        Imaging (last 24 hr):   I personally reviewed the most recent chest x-ray and other pertinent imaging studies.  Results for orders placed during the hospital encounter of 01/28/25    XR Chest 1 View    Narrative  XR CHEST 1 VW    Date of Exam: 1/31/2025 4:53 AM EST    Indication: postop    Comparison: 1 day prior.    Findings:  Support hardware projects unchanged. There are persistent left greater than right basilar opacities, likely areas of pleural effusion and volume loss, likely minimally increased from comparison. There is no distinct pneumothorax. Unchanged mild cardiac  enlargement. Sternotomy wires appear intact.    Impression  Impression:  Support hardware projects unchanged. There are persistent left greater than right " basilar opacities, likely areas of pleural effusion and volume loss, likely minimally increased from comparison. There is no distinct pneumothorax. Unchanged mild cardiac  enlargement. Sternotomy wires appear intact.      Electronically Signed: Teja Garrison MD  1/31/2025 8:01 AM EST  Workstation ID: [id number]        Last ECHO:  Results for orders placed in visit on 01/28/25    Intra-Op Anesthesia EMRE  1/28/2025    Echocardiogram Comments:  Moderately impaired lv function  EF estimated at 38%  [place] of LV is akinetic/dyskinetic  Nl valvular function  Images recorded of RADHA        PROBLEM LIST:     CAD s/p CABG x 5 1/28/2025    CAD (coronary artery disease)        Initial cardiac assessment: 79-year-old female found to have multivessel CAD at cath in October [facility] went to Dr. Del Castillo for second opinion, status post 5V CABG 1/28/2025, postop EF 38%.    ASSESSMENT/PLAN:  1.  CAD:  Status post 5V CABG  Continue aspirin and statin  Continue metoprolol low-dose  Blood pressure stable for now does not need midodrine    Encouraged ambulation with assistance.    Agree with Bumex    2.  Acute on chronic systolic heart failure:  Fairly euvolemic currently, EF on EMRE immediately postop 38%  Will order echo for postop evaluation of EF and valvular function.  Will slowly initiate GDMT, however hypotension and EDGARDO will impede progress.  Will consider starting Jardiance 10 mg daily on 2/1/2025    3.  Acute on CKD:  Maintain adequate perfusing pressure  Renal function back to baseline      4.  Hyperlipidemia:  Goal LDL less than 70, continue atorvastatin 40 mg daily    Discussed with patient's family at bedside      Sravan Arboleda MD  1/31/2025    12:25 EST

## 2025-01-31 NOTE — PROGRESS NOTES
CTS Progress Note    POD # 3 s/p CABG x 5     LOS: 3 days   Subjective  Sitting in bedside chair.  Typical incisional pain. Levophed off. On 4L NC.    Objective    Vital Signs  Temp:  [98 °F (36.7 °C)-99.1 °F (37.3 °C)] 98.3 °F (36.8 °C)  Heart Rate:  [64-96] 77  Resp:  [10-18] 16  BP: ()/(49-87) 118/62  Arterial Line BP: ()/(30-51) 125/51    Physical Exam:   General Appearance: alert, appears stated age and cooperative.   Lungs: Decreased lung sounds francisco   Heart: regular rhythm & normal rate, normal S1, S2 and no murmur, no gallop, no rub   Chest: sternum stable   Skin: Incision c/d/I   CT:       #1 and #2-490 cc/ 24 hrs     Results     Results from last 7 days   Lab Units 01/31/25  0600   WBC 10*3/mm3 11.12*   HEMOGLOBIN g/dL 7.6*   HEMATOCRIT % 24.5*   PLATELETS 10*3/mm3 134*     Results from last 7 days   Lab Units 01/31/25  0600   SODIUM mmol/L 139   POTASSIUM mmol/L 3.9   CHLORIDE mmol/L 106   CO2 mmol/L 24.0   BUN mg/dL 22   CREATININE mg/dL 1.01*   GLUCOSE mg/dL 149*   CALCIUM mg/dL 8.0*       Imaging Results (Last 24 Hours)       Procedure Component Value Units Date/Time    XR Chest 1 View [[id number]] Resulted: 01/31/25 0454     Updated: 01/31/25 0506    XR Chest 1 View [[id number]] Collected: 01/30/25 0810     Updated: 01/30/25 0821    Narrative:      XR CHEST 1 VW    Date of Exam: 1/30/2025 2:03 AM EST    Indication: Post-Op Heart Surgery    Comparison: 1/29/2025    Findings:  Interval removal of endotracheal and gastric tubes. Right IJ introducer remains in place with pulmonary artery catheter positioned at the cavoatrial junction. Mediastinal drain and bilateral chest tubes remain in place. No pneumothorax. Heart size and   pulmonary vasculature appear stable. No gross pulmonary edema or sizable pleural effusion. Atelectasis present in the lung bases      Impression:      Impression:    1. Bibasilar atelectasis.  2. No pneumothorax or gross pulmonary edema      Electronically Signed: Evan  Saul    1/30/2025 8:18 AM EST    Workstation ID: [id number]            Assessment  POD # 3 s/p CABG x 5    CAD s/p CABG x 5 1/28/2025    CAD (coronary artery disease)    Plan   Continue pleural tubes to suction until output decreases  Bumex 2mg IV  D/C davis  D/C central line  Wean oxygen as tolerated  Monitor H&H  Monitor renal function. Creatinine at baseline.  Ambulate TID  Pulmonary toilet    Zeferino Del Castillo MD  01/31/25  07:37 EST

## 2025-01-31 NOTE — THERAPY EVALUATION
Patient Name: Abena Washington  : 1945    MRN: [id number]                              Today's Date: 2025       Admit Date: 2025    Visit Dx:     ICD-10-CM ICD-9-CM   1. Coronary artery disease involving native coronary artery of native heart with angina pectoris  I25.119 414.01     413.9   2. Coronary artery disease involving native coronary artery of native heart, unspecified whether angina present  I25.10 414.01     Patient Active Problem List   Diagnosis    Nuclear sclerotic cataract of left eye    CAD s/p CABG x 5 2025    CAD (coronary artery disease)     Past Medical History:   Diagnosis Date    Anemia     Anxiety and depression     Arthritis     Cataracts, bilateral     Coronary artery disease     Delayed emergence from anesthesia     Diabetes mellitus     Heart attack     Hyperlipidemia     Hypertension     Nausea     Peripheral neuropathy     PONV (postoperative nausea and vomiting)     Skin melanoma     Wears glasses      Past Surgical History:   Procedure Laterality Date    CARDIAC CATHETERIZATION      CATARACT EXTRACTION W/ INTRAOCULAR LENS IMPLANT Left 2023    Procedure: CATARACT PHACO EXTRACTION WITH INTRAOCULAR LENS IMPLANT LEFT;  Surgeon: Mina Hendrickson MD;  Location: [facility] OR;  Service: Ophthalmology;  Laterality: Left;    CATARACT EXTRACTION W/ INTRAOCULAR LENS IMPLANT Right 2023    Procedure: CATARACT PHACO EXTRACTION WITH INTRAOCULAR LENS IMPLANT RIGHT;  Surgeon: Mina Hendrickson MD;  Location: [facility] OR;  Service: Ophthalmology;  Laterality: Right;    CHOLECYSTECTOMY      COLONOSCOPY      CORONARY ARTERY BYPASS GRAFT N/A 2025    Procedure: MEDIAN STERNOTOMY, CORONARY ARTERY BYPASS GRAFTING X 5, UTILIZING THE LEFT INTERNAL MAMMARY ARTERY, ENDOSCOPIC VEIN HARVESTING OF THE LEFT SAPHENOUS VEIN, TRANSESOPHAGEAL ECHOCARDIOGRAM WITH ANESTHESIA;  Surgeon: Zeferino Del Castillo MD;  Location: [facility] OR;  Service: Cardiothoracic;  Laterality: N/A;    ENDOSCOPY       HYSTERECTOMY      SKIN CANCER EXCISION      TUBAL ABDOMINAL LIGATION        General Information       Row Name 01/31/25 1533          OT Time and Intention    Document Type evaluation  -LR     Mode of Treatment occupational therapy  -LR       Row Name 01/31/25 1533          General Information    Patient Profile Reviewed yes  -LR     Prior Level of Function independent:;bed mobility;transfer;ADL's;all household mobility  -LR     Existing Precautions/Restrictions cardiac;fall;oxygen therapy device and L/min;sternal  -LR     Barriers to Rehab medically complex  -LR       Row Name 01/31/25 1533          Living Environment    People in Home alone;other (see comments)  dtr lives across street  -LR       Row Name 01/31/25 1533          Home Main Entrance    Number of Stairs, Main Entrance none  -LR       Row Name 01/31/25 1533          Stairs Within Home, Primary    Number of Stairs, Within Home, Primary none  -LR       Row Name 01/31/25 1533          Cognition    Orientation Status (Cognition) oriented x 3  -LR       Row Name 01/31/25 1533          Safety Issues/Impairments Affecting Functional Mobility    Safety Issues Affecting Function (Mobility) safety precaution awareness;awareness of need for assistance;safety precautions follow-through/compliance;insight into deficits/self-awareness;sequencing abilities  -LR     Impairments Affecting Function (Mobility) balance;coordination;endurance/activity tolerance;postural/trunk control;pain;strength;shortness of breath  -LR               User Key  (r) = Recorded By, (t) = Taken By, (c) = Cosigned By      Initials Name Provider Type    LR Jossie Borrero, OT Occupational Therapist                     Mobility/ADL's       Row Name 01/31/25 1534          Bed Mobility    Comment, (Bed Mobility) UIC  -LR       Row Name 01/31/25 1534          Transfers    Transfers sit-stand transfer;stand-sit transfer  -LR     Comment, (Transfers) VC for precautions  -LR       Row Name  01/31/25 1534          Sit-Stand Transfer    Sit-Stand Jennings (Transfers) minimum assist (75% patient effort);2 person assist;verbal cues  -LR     Assistive Device (Sit-Stand Transfers) other (see comments)  -LR       Row Name 01/31/25 1534          Stand-Sit Transfer    Stand-Sit Jennings (Transfers) minimum assist (75% patient effort);2 person assist;verbal cues  -LR       Row Name 01/31/25 1534          Functional Mobility    Functional Mobility- Ind. Level minimum assist (75% patient effort);2 person assist required;verbal cues required  -LR     Functional Mobility- Device other (see comments)  tele  -LR     Functional Mobility- Comment defer to PT  -LR     Patient was able to Ambulate yes  -LR       Row Name 01/31/25 1534          Activities of Daily Living    BADL Assessment/Intervention grooming  -LR       Row Name 01/31/25 1534          Grooming Assessment/Training    Jennings Level (Grooming) wash face, hands;moderate assist (50% patient effort)  -LR     Position (Grooming) supported sitting  -LR               User Key  (r) = Recorded By, (t) = Taken By, (c) = Cosigned By      Initials Name Provider Type    LR Jossie Borrero, OT Occupational Therapist                   Obj/Interventions       Row Name 01/31/25 1536          Sensory Assessment (Somatosensory)    Sensory Assessment (Somatosensory) UE sensation intact  -LR       Row Name 01/31/25 1536          Vision Assessment/Intervention    Visual Impairment/Limitations WFL  -LR       Row Name 01/31/25 1536          Range of Motion Comprehensive    General Range of Motion bilateral upper extremity ROM WFL  -LR       Row Name 01/31/25 1536          Strength Comprehensive (MMT)    General Manual Muscle Testing (MMT) Assessment upper extremity strength deficits identified  -LR     Comment, General Manual Muscle Testing (MMT) Assessment BUE grossly 4-/5 MMT  -LR       Row Name 01/31/25 1536          Balance    Balance Assessment sitting static  balance;sitting dynamic balance;standing static balance;standing dynamic balance  -LR     Static Sitting Balance contact guard  -LR     Dynamic Sitting Balance minimal assist;contact guard  -LR     Position, Sitting Balance unsupported;supported;sitting in chair  -LR     Static Standing Balance minimal assist;2-person assist  -LR     Dynamic Standing Balance minimal assist;2-person assist  -LR     Position/Device Used, Standing Balance supported  -LR     Balance Interventions sitting;standing;sit to stand;supported;static;dynamic;occupation based/functional task  -LR               User Key  (r) = Recorded By, (t) = Taken By, (c) = Cosigned By      Initials Name Provider Type    LR Jossie Borrero, OT Occupational Therapist                   Goals/Plan       Row Name 01/31/25 1541          Transfer Goal 1 (OT)    Activity/Assistive Device (Transfer Goal 1, OT) sit-to-stand/stand-to-sit;bed-to-chair/chair-to-bed;toilet  -LR     Buckley Level/Cues Needed (Transfer Goal 1, OT) contact guard required  -LR     Time Frame (Transfer Goal 1, OT) long term goal (LTG);1 week  -LR     Progress/Outcome (Transfer Goal 1, OT) goal ongoing;new goal  -LR       Row Name 01/31/25 1541          Grooming Goal 1 (OT)    Activity/Device (Grooming Goal 1, OT) wash face, hands;oral care;hair care  -LR     Buckley (Grooming Goal 1, OT) standby assist  -LR     Time Frame (Grooming Goal 1, OT) short term goal (STG);4 days  -LR     Strategies/Barriers (Grooming Goal 1, OT) sink side  -LR     Progress/Outcome (Grooming Goal 1, OT) goal ongoing;new goal  -LR       Row Name 01/31/25 1541          Therapy Assessment/Plan (OT)    Planned Therapy Interventions (OT) activity tolerance training;adaptive equipment training;BADL retraining;occupation/activity based interventions;strengthening exercise;ROM/therapeutic exercise;functional balance retraining;IADL retraining;passive ROM/stretching;transfer/mobility retraining;patient/caregiver  education/training  -LR               User Key  (r) = Recorded By, (t) = Taken By, (c) = Cosigned By      Initials Name Provider Type    LR Jossie Borrero, OT Occupational Therapist                   Clinical Impression       Row Name 01/31/25 1538          Pain Assessment    Pain Location chest  -LR     Pain Side/Orientation generalized  -LR     Pain Management Interventions exercise or physical activity utilized;nursing notified;activity modification encouraged  -LR     Response to Pain Interventions activity participation with tolerable pain  -LR       Row Name 01/31/25 1538          Pain Scale: FACES Pre/Post-Treatment    Pain: FACES Scale, Pretreatment 4-->hurts little more  -LR     Posttreatment Pain Rating 4-->hurts little more  -LR       Row Name 01/31/25 1538          Plan of Care Review    Plan of Care Reviewed With patient  -LR     Progress improving  -LR     Outcome Evaluation OT eval complete. Pt presents below baseline function with decreased strength, endurance, ADL performance, and balance deficits.VC to maintain sternal precautions. Pt feeling nauseous this session. Mod A progressing min A with face washing. Recommend IPOT POC and home with HH at D/C.  -LR       Row Name 01/31/25 1538          Therapy Assessment/Plan (OT)    Patient/Family Therapy Goal Statement (OT) PLOF  -LR     Rehab Potential (OT) good  -LR     Criteria for Skilled Therapeutic Interventions Met (OT) yes;skilled treatment is necessary  -LR     Therapy Frequency (OT) daily  -LR     Predicted Duration of Therapy Intervention (OT) 7 days  -LR       Row Name 01/31/25 1538          Therapy Plan Review/Discharge Plan (OT)    Anticipated Discharge Disposition (OT) home with home health  -LR       Row Name 01/31/25 1538          Vital Signs    Pre Systolic BP Rehab 147  -LR     Pre Treatment Diastolic BP 71  -LR     Post Systolic BP Rehab 132  -LR     Post Treatment Diastolic BP 57  -LR     Pretreatment Heart Rate (beats/min) 83  -LR      Posttreatment Heart Rate (beats/min) 82  -LR     Pre SpO2 (%) 97  -LR     O2 Delivery Pre Treatment nasal cannula  -LR     O2 Delivery Intra Treatment nasal cannula  -LR     Post SpO2 (%) 96  -LR     O2 Delivery Post Treatment nasal cannula  -LR     Pre Patient Position Sitting  -LR     Intra Patient Position Standing  -LR     Post Patient Position Sitting  -LR       Row Name 01/31/25 1538          Positioning and Restraints    Pre-Treatment Position sitting in chair/recliner  -LR     Post Treatment Position chair  -LR     In Chair notified nsg;reclined;call light within reach;encouraged to call for assist;exit alarm on;waffle cushion;legs elevated  -LR               User Key  (r) = Recorded By, (t) = Taken By, (c) = Cosigned By      Initials Name Provider Type    LR Jossie Borrero, OT Occupational Therapist                   Outcome Measures       Row Name 01/31/25 1541          How much help from another is currently needed...    Putting on and taking off regular lower body clothing? 2  -LR     Bathing (including washing, rinsing, and drying) 2  -LR     Toileting (which includes using toilet bed pan or urinal) 2  -LR     Putting on and taking off regular upper body clothing 3  -LR     Taking care of personal grooming (such as brushing teeth) 3  -LR     Eating meals 3  -LR     AM-PAC 6 Clicks Score (OT) 15  -LR       Row Name 01/31/25 1308          How much help from another person do you currently need...    Turning from your back to your side while in flat bed without using bedrails? 2  -KG     Moving from lying on back to sitting on the side of a flat bed without bedrails? 2  -KG     Moving to and from a bed to a chair (including a wheelchair)? 3  -KG     Standing up from a chair using your arms (e.g., wheelchair, bedside chair)? 3  -KG     Climbing 3-5 steps with a railing? 2  -KG     To walk in hospital room? 2  -KG     AM-PAC 6 Clicks Score (PT) 14  -KG     Highest Level of Mobility Goal 4 --> Transfer  to chair/commode  -KG       Row Name 01/31/25 1541 01/31/25 1314       Functional Assessment    Outcome Measure Options AM-PAC 6 Clicks Daily Activity (OT)  -LR AM-PAC 6 Clicks Basic Mobility (PT)  -KG      Row Name 01/31/25 1308          Functional Assessment    Outcome Measure Options AM-PAC 6 Clicks Basic Mobility (PT)  -KG               User Key  (r) = Recorded By, (t) = Taken By, (c) = Cosigned By      Initials Name Provider Type    KG Patrica Rodriguez, PT Physical Therapist    LR Jossie Borrero, OT Occupational Therapist                    Occupational Therapy Education       Title: PT OT SLP Therapies (In Progress)       Topic: Occupational Therapy (In Progress)       Point: ADL training (In Progress)       Description:   Instruct learner(s) on proper safety adaptation and remediation techniques during self care or transfers.   Instruct in proper use of assistive devices.                  Learning Progress Summary            Patient Acceptance, E, NR by LR at 1/31/2025 0905                      Point: Home exercise program (In Progress)       Description:   Instruct learner(s) on appropriate technique for monitoring, assisting and/or progressing therapeutic exercises/activities.                  Learning Progress Summary            Patient Acceptance, E, NR by LR at 1/31/2025 0905                      Point: Precautions (In Progress)       Description:   Instruct learner(s) on prescribed precautions during self-care and functional transfers.                  Learning Progress Summary            Patient Acceptance, E, NR by LR at 1/31/2025 0905                      Point: Body mechanics (In Progress)       Description:   Instruct learner(s) on proper positioning and spine alignment during self-care, functional mobility activities and/or exercises.                  Learning Progress Summary            Patient Acceptance, E, NR by LR at 1/31/2025 0905                                      User Key        Initials Effective Dates Name Provider Type Discipline    LR 10/09/24 -  Jossie Borrero OT Occupational Therapist OT                  OT Recommendation and Plan  Planned Therapy Interventions (OT): activity tolerance training, adaptive equipment training, BADL retraining, occupation/activity based interventions, strengthening exercise, ROM/therapeutic exercise, functional balance retraining, IADL retraining, passive ROM/stretching, transfer/mobility retraining, patient/caregiver education/training  Therapy Frequency (OT): daily  Plan of Care Review  Plan of Care Reviewed With: patient  Progress: improving  Outcome Evaluation: OT eval complete. Pt presents below baseline function with decreased strength, endurance, ADL performance, and balance deficits.VC to maintain sternal precautions. Pt feeling nauseous this session. Mod A progressing min A with face washing. Recommend IPOT POC and home with HH at D/C.     Time Calculation:   Evaluation Complexity (OT)  Review Occupational Profile/Medical/Therapy History Complexity: brief/low complexity  Assessment, Occupational Performance/Identification of Deficit Complexity: 1-3 performance deficits  Clinical Decision Making Complexity (OT): problem focused assessment/low complexity  Overall Complexity of Evaluation (OT): low complexity     Time Calculation- OT       Row Name 01/31/25 1542             Time Calculation- OT    OT Start Time 0905  -LR      OT Received On 01/31/25  -LR      OT Goal Re-Cert Due Date 02/10/25  -LR         Untimed Charges    OT Eval/Re-eval Minutes 47  -LR         Total Minutes    Untimed Charges Total Minutes 47  -LR       Total Minutes 47  -LR                User Key  (r) = Recorded By, (t) = Taken By, (c) = Cosigned By      Initials Name Provider Type    LR Jossie Borrero OT Occupational Therapist                  Therapy Charges for Today       Code Description Service Date Service Provider Modifiers Qty    07397669107 HC OT EVAL LOW  COMPLEXITY 4 1/31/2025 Jossie Borrero, OT GO 1                 Jossie Borrero, OT  1/31/2025

## 2025-01-31 NOTE — CASE MANAGEMENT/SOCIAL WORK
Continued Stay Note  [facility]     Patient Name: Abena Washington  MRN: [id number]  Today's Date: 1/31/2025    Admit Date: 1/28/2025    Plan: Home HH   Discharge Plan       Row Name 01/31/25 1417       Plan    Plan Home HH    Plan Comments 2 Discount cards given to pt daughter per her request. Attempt made to get in contact with [facility] but they are already gone for the day. CM will follow up on Monday.    Final Discharge Disposition Code 06 - home with home health care      Row Name 01/31/25 1404       Plan    Plan TBD    Patient/Family in Agreement with Plan yes    Plan Comments Spoke with patient at bedside with no family present about therapy recs for HH with patient agreeable. Referrals faxed to 2 facilities in pt area with address given for daughters house per pt request as she states that is where she will be going. CM will cont to follow.                   Discharge Codes    No documentation.                       Anna Muñoz RN

## 2025-01-31 NOTE — DISCHARGE PLACEMENT REQUEST
"Maurice Washington (79 y.o. Female)     Anna [phone]      Date of Birth   1945    Social Security Number       Address   [address] [place] [id number]    Home Phone   [phone]    MRN   [id number]       [facility]    Marital Status                               Admission Date   1/28/25    Admission Type   Elective    Admitting Provider   Zeferino Del Castillo MD    Attending Provider   Zeferino Del Castillo MD    Department, Room/Bed   [facility]       Discharge Date       Discharge Disposition       Discharge Destination                                 Attending Provider: Zeferino Del Castillo MD    Allergies: No Known Allergies    Isolation: None   Infection: None   Code Status: CPR    Ht: 157.5 cm (62\")   Wt: 54.7 kg (120 lb 9.5 oz)    Admission Cmt: None   Principal Problem: CAD s/p CABG x 5 1/28/2025 [I25.10]                   Active Insurance as of 1/28/2025       Primary Coverage       Payor Plan Insurance Group Employer/Plan Group    HUMANA MEDICARE REPLACEMENT HUMANA MED ADV PPO [id number]       Payor Plan Address Payor Plan Phone Number Payor Plan Fax Number Effective Dates    [address] [address] [phone]  1/1/2023 - None Entered    [facility] [address]         Subscriber Name Subscriber Birth Date Member ID       MAURICE WASHINGTON 1945 [id number]                     Emergency Contacts        (Rel.) Home Phone Work Phone Mobile Phone    Vero Buck (Daughter) [phone] -- [phone]    Radha Frederick (Daughter) -- -- [phone]                 History & Physical        Shun Madrid PA at 01/28/25 0631       Attestation signed by Zeferino Del Castillo MD at 01/28/25 0806    I have reviewed this documentation and agree.  Plan for CABG.                       Office Visit  1/15/2025  [facility] CARDIOTHORACIC SURGERY     Mine Hanson PA-C  Cardiothoracic Surgery Coronary artery disease involving native coronary artery " of native heart with angina pectoris  Dx Coronary Artery Disease ; Referred by Eddi Cheatham MD  Reason for Visit     Progress Notes  Mine Hanson PA-C (Physician Assistant)  Cardiothoracic Surgery  Expand All Collapse All                   [facility] Cardiothoracic Surgery Office Follow Up Note      Date of Encounter: 01/15/2025      Name: Abena Washington  : 1945      Referred By: Eddi Cheatham MD  PCP: Eddi Cheatham MD     Chief Complaint:         Chief Complaint   Patient presents with    Coronary Artery Disease       New patient per Dr. Eddi Cheatham for 2nd opinion CABG eval          Subjective      History of Present Illness:    Abena Washington is a 79 y.o. female presents for second opinion of CAD. She had cardiac catheterization on 10/30/24 at [facility] and was found to have severe three vessel disease including 30-40% left main stenosis, 100% chronic total occlusion of LAD, 70% diagonal branch, 70% left circumflex, 70% stenosis OM1, and 95% RCA stenosis.      Patient is having very frequent episodes of chest pressure. This is with activity and at rest, and even exposure to cold air can trigger an episode of chest pressure. This is relieved with belching and episodes resolve quickly. She is having numbness and tingling of the fingers bilaterally.   She has a strong family history of CAD. Dr. Del Castillo performed a CABG on her son about 8 years ago, which is why she felt strongly about getting a second opinion.      She denies previous thoracic surgery or radiation to the chest. She has recently had an excision of a skin lesion found to be melanoma on her right upper scapular region but has received no further treatment as of yet.   Of note, when the cardiac catheterization was performed, patient states they attempted access via right radial but were unable and had to use femoral access. Patient is right handed.      Review of Systems:  Review of Systems   Constitutional: Negative for chills, decreased  "appetite, diaphoresis, fever, malaise/fatigue, night sweats, weight gain and weight loss.   HENT:  Negative for hoarse voice.    Eyes:  Negative for blurred vision, double vision and visual disturbance.   Cardiovascular:  Positive for chest pain (pressure in chest, sometimes slight \"twinge\" of pain). Negative for claudication, dyspnea on exertion, irregular heartbeat, leg swelling, near-syncope, orthopnea, palpitations, paroxysmal nocturnal dyspnea and syncope.   Respiratory:  Negative for cough, hemoptysis, shortness of breath, sputum production and wheezing.    Hematologic/Lymphatic: Negative for adenopathy and bleeding problem. Does not bruise/bleed easily.   Skin:  Negative for color change, nail changes, poor wound healing and rash.   Musculoskeletal:  Negative for back pain, falls and muscle cramps.   Gastrointestinal:  Positive for heartburn (burping with the episodes of chest pressure). Negative for abdominal pain and dysphagia.   Genitourinary:  Negative for flank pain.   Neurological:  Positive for dizziness. Negative for brief paralysis, disturbances in coordination, focal weakness, headaches, light-headedness, loss of balance, numbness, paresthesias, sensory change, vertigo and weakness.   Psychiatric/Behavioral:  Negative for depression and suicidal ideas.    Allergic/Immunologic: Negative for persistent infections.         I have reviewed the following portions of the patient's history: problem list, current medications, allergies, past surgical history, past medical history, past social history, past family history, and ROS and confirm it's accurate.     Allergies:  Allergies   No Known Allergies        Medications:      Current Medications      Current Outpatient Medications:     alendronate (FOSAMAX) 70 MG tablet, Take 1 tablet by mouth Every 7 (Seven) Days., Disp: , Rfl:     amLODIPine (NORVASC) 10 MG tablet, Take 1 tablet by mouth Daily., Disp: , Rfl:     aspirin 81 MG EC tablet, Take 1 tablet by " mouth Daily., Disp: , Rfl:     carvedilol (COREG) 12.5 MG tablet, Take 1 tablet by mouth 2 (Two) Times a Day With Meals., Disp: , Rfl:     empagliflozin (JARDIANCE) 10 MG tablet tablet, Take  by mouth Daily., Disp: , Rfl:     fluticasone (FLONASE) 50 MCG/ACT nasal spray, Administer 2 sprays into the nostril(s) as directed by provider Daily., Disp: , Rfl:     gabapentin (NEURONTIN) 300 MG capsule, Take 1 capsule by mouth 2 (Two) Times a Day As Needed., Disp: , Rfl:     glipizide (GLUCOTROL) 10 MG tablet, Take 2 tablets by mouth 2 (Two) Times a Day Before Meals., Disp: , Rfl:     hydroCHLOROthiazide (HYDRODIURIL) 25 MG tablet, Take 1 tablet by mouth Daily., Disp: , Rfl:     metFORMIN (GLUCOPHAGE) 1000 MG tablet, Take 1 tablet by mouth 2 (Two) Times a Day With Meals., Disp: , Rfl:     naproxen (NAPROSYN) 500 MG tablet, Take 1 tablet by mouth 2 (Two) Times a Day As Needed for Mild Pain., Disp: , Rfl:     pioglitazone (ACTOS) 45 MG tablet, Take 1 tablet by mouth Daily As Needed., Disp: , Rfl:     potassium chloride (K-DUR,KLOR-CON) 20 MEQ CR tablet, Take 1 tablet by mouth 2 (Two) Times a Day., Disp: , Rfl:     potassium chloride (KLOR-CON) 20 MEQ packet, Take 20 mEq by mouth Daily., Disp: , Rfl:     sacubitril-valsartan (Entresto) 49-51 MG tablet, Take 1 tablet by mouth 2 (Two) Times a Day., Disp: , Rfl:     SITagliptin (JANUVIA) 100 MG tablet, Take 1 tablet by mouth Daily., Disp: , Rfl:     difluprednate (DUREZOL) 0.05 % ophthalmic emulsion, Follow Instructions on AVS (Patient not taking: Reported on 1/15/2025), Disp: , Rfl:     difluprednate (DUREZOL) 0.05 % ophthalmic emulsion, Follow Instructions on AVS (Patient not taking: Reported on 1/15/2025), Disp: , Rfl:     enalapril (VASOTEC) 20 MG tablet, Take 1 tablet by mouth 2 (Two) Times a Day. (Patient not taking: Reported on 1/15/2025), Disp: , Rfl:     lovastatin (MEVACOR) 40 MG tablet, Take 1 tablet by mouth 2 (Two) Times a Day. (Patient not taking: Reported on  "1/15/2025), Disp: , Rfl:         History:   Medical History        Past Medical History:   Diagnosis Date    Anemia      Anxiety and depression      Arthritis      Delayed emergence from anesthesia      Diabetes mellitus      Heart attack      Hyperlipidemia      Hypertension      Nausea      Peripheral neuropathy      PONV (postoperative nausea and vomiting)      Skin melanoma      Wears glasses              Surgical History         Past Surgical History:   Procedure Laterality Date    CARDIAC CATHETERIZATION        CATARACT EXTRACTION W/ INTRAOCULAR LENS IMPLANT Left 06/21/2023     Procedure: CATARACT PHACO EXTRACTION WITH INTRAOCULAR LENS IMPLANT LEFT;  Surgeon: Mina Hendrickson MD;  Location: [facility];  Service: Ophthalmology;  Laterality: Left;    CATARACT EXTRACTION W/ INTRAOCULAR LENS IMPLANT Right 07/07/2023     Procedure: CATARACT PHACO EXTRACTION WITH INTRAOCULAR LENS IMPLANT RIGHT;  Surgeon: Mina Hendrickson MD;  Location: [facility];  Service: Ophthalmology;  Laterality: Right;    CHOLECYSTECTOMY        COLONOSCOPY        ENDOSCOPY        HYSTERECTOMY        SKIN CANCER EXCISION        TUBAL ABDOMINAL LIGATION                Social History   Social History           Socioeconomic History    Marital status: Unknown   Tobacco Use    Smoking status: Never    Smokeless tobacco: Never   Vaping Use    Vaping status: Never Used   Substance and Sexual Activity    Alcohol use: Never    Drug use: Never    Sexual activity: Defer                  Family History   Problem Relation Age of Onset    Diabetes Mother      Dementia Mother      Coronary artery disease Father      Stroke Son      Coronary artery disease Son           Objective      Physical Exam:  Vitals       Vitals:     01/15/25 0957   BP: 138/58   BP Location: Right arm   Patient Position: Sitting   Pulse: 57   Temp: 97.9 °F (36.6 °C)   SpO2: 99%   Weight: 53.8 kg (118 lb 9.6 oz)   Height: 157.5 cm (62\")         Body mass index is 21.69 kg/m².   "   Physical Exam  Vitals and nursing note reviewed.   Constitutional:       Appearance: Normal appearance.   Cardiovascular:      Rate and Rhythm: Normal rate and regular rhythm.      Pulses: Normal pulses.           Radial pulses are 2+ on the right side and 2+ on the left side.      Heart sounds: Normal heart sounds.   Pulmonary:      Effort: Pulmonary effort is normal.      Breath sounds: Normal breath sounds.   Musculoskeletal:      Right lower leg: No edema.      Left lower leg: No edema.   Skin:     Capillary Refill: Capillary refill takes less than 2 seconds.   Neurological:      General: No focal deficit present.      Mental Status: She is alert and oriented to person, place, and time.   Psychiatric:         Mood and Affect: Mood normal.         Behavior: Behavior normal.            Imaging/Labs:  Cath images and report reviewed         Assessment / Plan       Assessment / Plan:     Severe three vessel CAD   30-40% left main stenosis, 100% chronic total occlusion of LAD, 70% diagonal branch, 70% left circumflex, 70% stenosis OM1, and 95% RCA stenosis  Patient has diabetes A1c 6.9%, HTN, HLD and is a reasonable candidate for CABG.   Discussed the procedure in great detail and answered all questions. Risks, benefits, and alternatives were presented to the patient and through shared decision making, the patient elects to proceed with procedure.   Episodes of chest pressure several days of the week. Advised patient to present to ED with further episodes of chest pressure  Discussed LEAAPS trial, but patient not a candidate due to sensitivity to nickel jewelry.         Follow Up:   Schedule for CABG as soon as possible   Advised patient that if she has further episodes of chest pressure, she needs to present to the nearest ED        Mine Hanson PA-C   [facility] Cardiothoracic Surgery                            Time Spent: I spent 45 minutes caring for Abena on this date of service. This time includes time  "spent by me in the following activities: preparing for the visit, reviewing tests, obtaining and/or reviewing a separately obtained history, performing a medically appropriate examination and/or evaluation, counseling and educating the patient/family/caregiver, ordering medications, tests, or procedures, referring and communicating with other health care professionals, documenting information in the medical record, independently interpreting results and communicating that information with the patient/family/caregiver, and care coordination.          Instructions    After Visit Summary (Printed 1/15/2025),     COVID Immunization Verification (Printed 1/15/2025),     COVID Lab Result Verification (Printed 1/15/2025)  Additional Documentation    Vitals: /58 (BP Location: Right arm, Patient Position: Sitting)     Pulse 57     Temp 97.9 °F (36.6 °C)     Ht 157.5 cm (62\")     Wt 53.8 kg (118 lb 9.6 oz)     SpO2 99%     BMI 21.69 kg/m²     BSA 1.53 m²   Encounter Info: Billing Info,     History,     Allergies,     Detailed Report,     ...(8 more)     Communications      Jainism Preferred Visit Summary sent to Eddi Cheatham MD  Media  From this encounter  Scan on 1/16/2025 0924 by Julee Tejeda: ENC RW 01/15/25   Scan on 11/15/2024 1149 by Trina Jamison V: correspondence per rom   Scan on 11/15/2024 1146 by Trina Jamison V: correspondence per rom     Encounter Information    Encounter Information   Provider Department Encounter #   1/15/2025 10:30 AM Mine Hanson PA-C [facility] [id number]     Primary Visit Coverage    Payer Plan Sponsor Code Group Number Group Name   HUMANA MEDICARE REPLACEMENT HUMANA MED ADV PPO  [id number]      Primary Visit Coverage Subscriber    Subscriber ID Subscriber Name Subscriber SSN Subscriber Address   [id number] MAURICE CORONADO  [address] [address]     Orders Placed    None  Medication Changes      None  Medication List  Visit " Diagnoses      Coronary artery disease involving native coronary artery of native heart with angina pectoris  Problem List   Current Medications    [facility] Pre-op    Full history and physical note from office is up to date.     /64 (BP Location: Left arm, Patient Position: Lying)   Pulse 66   Temp 98 °F (36.7 °C) (Temporal)   Resp 16   SpO2 98%   Denies allergy to contrast dye or latex  IMM:  Influenza: Yes  Pneumococcal: Yes  Tetanus: Up-to-date  Lungs: Clear to auscultation bilaterally bases  Cardiovascular: S1-S2 without rubs murmurs or gallops  Abdomen: Soft, nontender, bowel sounds present throughout.    LAB Results:  Lab Results   Component Value Date    WBC 8.12 01/27/2025    HGB 13.6 01/27/2025    HCT 42.9 01/27/2025    MCV 91.1 01/27/2025     01/27/2025    NEUTROABS 4.62 01/27/2025    GLUCOSE 102 (H) 01/27/2025    BUN 25 (H) 01/27/2025    CREATININE 1.12 (H) 01/27/2025     01/27/2025    K 4.2 01/27/2025    CL 98 01/27/2025    CO2 27.0 01/27/2025    MG 2.2 01/27/2025    CALCIUM 9.7 01/27/2025    ALBUMIN 4.7 01/27/2025    AST 15 01/27/2025    ALT 14 01/27/2025    BILITOT 0.2 01/27/2025    PTT 21.7 (L) 01/27/2025    INR 0.91 01/27/2025       Cancer Staging (if applicable)  Cancer Patient: __ yes __no __unknown__N/A; If yes, clinical stage T:__ N:__M:__, stage group or __N/A  Impression: Angina pectoris  Multivessel coronary artery disease  Hypertension  Hyperlipidemia  Diabetes mellitus  Anxiety and depression  Renal insufficiency with a BUN of 25 and creatinine 1.12 respectively  Plan: Coronary artery bypass grafting, radial artery harvest, or transesophageal echocardiogram with anesthesia.  SAM Wang   01/28/25   6:32 AM EST       Electronically signed by Zeferino Del Castillo MD at 01/28/25 0806        [facility]  [address] [facility] [place] [address]  Phone:  [phone]  Fax:  [phone] Date: Jan 31, 2025      Ambulatory  Referral to Home Health     Patient:  Abena Washington MRN:  [id number]   [id number]   [facility] [place] [address] :  1945  SSN:    Phone: [phone] Sex:  F      INSURANCE PAYOR PLAN GROUP # SUBSCRIBER ID   Primary:    HUMANA MEDICARE REPLACEMENT [id number]      Referring Provider Information:  JUDSON BREWSTER Phone: [phone] Fax: [phone]       Referral Information:   # Visits:  999 Referral Type: Home Health [42]   Urgency:  Routine Referral Reason: Specialty Services Required   Start Date: 2025 End Date:  To be determined by Insurer   Diagnosis: Coronary artery disease involving native coronary artery of native heart, unspecified whether angina present (I25.10 [ICD-10-CM] 414.01 [ICD-9-CM])      Refer to Dept:   Refer to Provider:   Refer to Provider Phone:   Refer to Facility:       Face to Face Visit Date: 2025  Follow-up provider for Plan of Care? I treated the patient in an acute care facility and will not continue treatment after discharge.  Follow-up provider: COLTEN CALDWELL [[id number]]  Reason/Clinical Findings: CABG  Describe mobility limitations that make leaving home difficult: impaired functional mobility, gait, balance, and endurance  Nursing/Therapeutic Services Requested: Skilled Nursing  Nursing/Therapeutic Services Requested: Physical Therapy  Skilled nursing orders: Post CABG care  Skilled nursing orders: Cardiopulmonary assessments  Skilled nursing orders: Neurovascular assessments  PT orders: Home safety assessment  PT orders: Strengthening  Frequency: 1 Week 1     This document serves as a request of services and does not constitute Insurance authorization or approval of services.  To determine eligibility, please contact the members Insurance carrier to verify and review coverage.     If you have medical questions regarding this request for services. Please contact [address] at [phone] during normal business hours.        Verbal  Order Mode: Telephone with readback   Authorizing Provider: Zeferino Del Castillo MD  Authorizing Provider's NPI: [id number]     Order Entered By: Anna Muñoz RN 1/31/2025 12:19 PM     Electronically signed by:  Zeferino Del Castillo MD

## 2025-01-31 NOTE — PROGRESS NOTES
Intensive Care Follow-up     Hospital:  LOS: 3 days   Ms. Abena Washington, 79 y.o. female is followed for:   Coronary artery disease involving native coronary artery of native heart   Postoperative mechanical ventilation, hemodynamic, electrolyte monitoring          History of present illness:   79-year-old female with diabetes, hypertension, coronary disease, dyslipidemia recently seen by cardiology team with complaints of intermittent/exertional chest pressure.  Echo showed EF of 45 to 50%.  Underwent stress testing which was abnormal and underwent subsequently left heart cath which showed multivessel coronary disease.  Patient was referred to CT surgery for surgical revascularization.  Patient underwent preop workup which included bilateral carotid duplex which did not show any significant carotid disease.  Spirometry was done but results are not available.  Patient was deemed surgical candidate so underwent coronary bypass graft surgery x 5 by Dr. Del Castillo on 1/28.  Estimated blood loss was 500 mL.  Cross-clamp time of 118 minutes and bypass time of 132 minutes.  Post procedure patient is transferred to ICU.  No other intraoperative complications.  Patient did not require any blood transfusion intraoperatively.       Subjective   Interval History:  Patient feeling much better today.  Down to nasal cannula oxygen from high flow nasal cannula.  Getting Bumex 2 mg IV.  Renal function is better.  Mediastinal tubes have been discontinued but pleural tubes are remaining in.  No bowel movement yet.  Starting to eat better.                 The patient's past medical, surgical and social history were reviewed and updated in Epic as appropriate.       Objective     Infusions:  niCARdipine, 5-15 mg/hr  norepinephrine, 0.02-0.3 mcg/kg/min, Last Rate: Stopped (01/31/25 0630)      Medications:  acetaminophen, 1,000 mg, Nasogastric, Q8H  aspirin, 325 mg, Nasogastric, Daily  atorvastatin, 40 mg, Nasogastric, Nightly  gabapentin,  "100 mg, Nasogastric, TID  insulin glargine, 10 Units, Subcutaneous, Daily  insulin lispro, 2-7 Units, Subcutaneous, 4x Daily AC & at Bedtime  metoprolol tartrate, 12.5 mg, Nasogastric, Q12H  mupirocin, 1 Application, Each Nare, BID  pantoprazole, 40 mg, Intravenous, Q AM  pharmacy consult - MTM, , Not Applicable, Daily  Scopolamine, 1 patch, Transdermal, Q72H  senna-docusate sodium, 2 tablet, Nasogastric, BID  sodium chloride, 10 mL, Intravenous, Q12H        Vital Sign Min/Max for last 24 hours  Temp  Min: 98 °F (36.7 °C)  Max: 99.1 °F (37.3 °C)   BP  Min: 100/53  Max: 147/71   Pulse  Min: 64  Max: 96   Resp  Min: 10  Max: 18   SpO2  Min: 91 %  Max: 98 %   Flow (L/min) (Oxygen Therapy)  Min: 2  Max: 50       Input/Output for last 24 hour shift  01/30 0701 - 01/31 0700  In: 979 [P.O.:540; I.V.:439]  Out: 1335 [Urine:845]      Objective:  Vital signs: (most recent): Blood pressure 132/57, pulse 85, temperature 98.3 °F (36.8 °C), temperature source Oral, resp. rate 16, height 157.5 cm (62\"), weight 54.7 kg (120 lb 9.5 oz), SpO2 94%.            General Appearance: Awake, alert, in no acute distress on nasal cannula oxygen   Lungs:   B/L Breath sounds present with decreased breath sounds on bases, no wheezing heard, occ crackles.  Chest tube in place and draining serosanguineous fluid  Heart: S1 and S2 present, no murmur  Abdomen: Soft, nontender, no guarding or rigidity, bowel sounds positive.  Extremities: Trace edema, warm to touch.  Neurologic:  Moving all four extremities. Good strength bilaterally.   Psychological: Normal affect, Cooperative      Results from last 7 days   Lab Units 01/31/25  0600 01/30/25  1224 01/30/25  0334 01/29/25  0314   WBC 10*3/mm3 11.12*  --  11.28* 11.20*   HEMOGLOBIN g/dL 7.6* 8.2* 7.7* 8.3*   PLATELETS 10*3/mm3 134*  --  128* 160     Results from last 7 days   Lab Units 01/31/25  0600 01/30/25  1224 01/30/25  0334 01/29/25  0314 01/28/25  1755 01/28/25  1321   SODIUM mmol/L 139  --  142 " 145 146* 146*   POTASSIUM mmol/L 3.9 4.5 3.9 4.0 4.3 4.0   CO2 mmol/L 24.0  --  22.0 26.0 28.0 28.0   BUN mg/dL 22  --  18 20 18 18   CREATININE mg/dL 1.01*  --  1.26* 1.32* 1.18* 1.06*   MAGNESIUM mg/dL 2.2  --   --  2.6* 2.7* 3.1*   PHOSPHORUS mg/dL 2.3*  --   --   --  4.1 3.2   GLUCOSE mg/dL 149*  --  156* 191* 197* 127*     Estimated Creatinine Clearance: 40.3 mL/min (A) (by C-G formula based on SCr of 1.01 mg/dL (H)).    Results from last 7 days   Lab Units 01/29/25  0652   PH, ARTERIAL pH units 7.334*   PCO2, ARTERIAL mm Hg 47.4*   PO2 ART mm Hg 129.0*       Images:   Chest x-ray reviewed and patient s/p median sternotomy.  Patient has cardiomegaly with no significant pulmonary vascular congestion.  Right IJ catheter in good position.  Right base atelectasis noted.    I reviewed the patient's results and images.     Assessment & Plan   Impression        CAD s/p CABG x 5 1/28/2025    CAD (coronary artery disease)       Plan        1.  Patient status post coronary bypass graft surgery.  CT surgery and cardiology following postop course.  Continue aspirin, statin and beta-blockers as tolerated hemodynamically.  Patient is doing better hemodynamically and off pressors.  2.  Continue chest tube drainage.  Plan is to keep pleural tubes in but mediastinal tubes have been discontinued.  3.  Getting diuresis today with Bumex 2 mg IV.  Renal function looks better compared to yesterday.    4.  Respiratory wise doing better and down to nasal cannula oxygen.  Will continue working with incentive spirometry.  Add flutter valve to prevent further atelectasis.  5.  Out of bed and mobilize as tolerated.  6.  Hemoglobin slowly trending down.  No acute bleed noted.  Will recheck again in the morning or earlier if any hemodynamic instability.  7.  Discontinue Cordis.  Discontinue Mckinney catheter.  8.  Continue current insulin regimen.  Blood sugars under okay control.  9.  GI prophylaxis.  SCDs for DVT prophylaxis.  10.  Judicious  pain control.    Continue close monitoring in ICU. Okay    Plan of care and goals reviewed with multidisciplinary/antibiotic stewardship team during rounds.   I discussed the patient's findings and my recommendations with patient and nursing staff     Above documentation reviewed and reflect accurate information as of 1/31/2025 including copied elements of the note.       Nik Curry MD, [facility]  Pulmonary, Critical care and Sleep Medicine

## 2025-01-31 NOTE — THERAPY TREATMENT NOTE
Patient Name: Abena Washington  : 1945    MRN: [id number]                              Today's Date: 2025       Admit Date: 2025    Visit Dx:     ICD-10-CM ICD-9-CM   1. Coronary artery disease involving native coronary artery of native heart with angina pectoris  I25.119 414.01     413.9   2. Coronary artery disease involving native coronary artery of native heart, unspecified whether angina present  I25.10 414.01     Patient Active Problem List   Diagnosis    Nuclear sclerotic cataract of left eye    CAD s/p CABG x 5 2025    CAD (coronary artery disease)     Past Medical History:   Diagnosis Date    Anemia     Anxiety and depression     Arthritis     Cataracts, bilateral     Coronary artery disease     Delayed emergence from anesthesia     Diabetes mellitus     Heart attack     Hyperlipidemia     Hypertension     Nausea     Peripheral neuropathy     PONV (postoperative nausea and vomiting)     Skin melanoma     Wears glasses      Past Surgical History:   Procedure Laterality Date    CARDIAC CATHETERIZATION      CATARACT EXTRACTION W/ INTRAOCULAR LENS IMPLANT Left 2023    Procedure: CATARACT PHACO EXTRACTION WITH INTRAOCULAR LENS IMPLANT LEFT;  Surgeon: Mina Hendrickson MD;  Location: [facility] OR;  Service: Ophthalmology;  Laterality: Left;    CATARACT EXTRACTION W/ INTRAOCULAR LENS IMPLANT Right 2023    Procedure: CATARACT PHACO EXTRACTION WITH INTRAOCULAR LENS IMPLANT RIGHT;  Surgeon: Mian Hendrickson MD;  Location: [facility] OR;  Service: Ophthalmology;  Laterality: Right;    CHOLECYSTECTOMY      COLONOSCOPY      CORONARY ARTERY BYPASS GRAFT N/A 2025    Procedure: MEDIAN STERNOTOMY, CORONARY ARTERY BYPASS GRAFTING X 5, UTILIZING THE LEFT INTERNAL MAMMARY ARTERY, ENDOSCOPIC VEIN HARVESTING OF THE LEFT SAPHENOUS VEIN, TRANSESOPHAGEAL ECHOCARDIOGRAM WITH ANESTHESIA;  Surgeon: Zeferino Del Castillo MD;  Location: [facility] OR;  Service: Cardiothoracic;  Laterality: N/A;    ENDOSCOPY       HYSTERECTOMY      SKIN CANCER EXCISION      TUBAL ABDOMINAL LIGATION        General Information       Row Name 01/31/25 1303          Physical Therapy Time and Intention    Document Type therapy note (daily note)  -KG     Mode of Treatment physical therapy  -KG       Row Name 01/31/25 1303          General Information    Prior Level of Function independent:;all household mobility;gait;transfer;ADL's;dressing;bathing  -KG     Existing Precautions/Restrictions cardiac;fall;oxygen therapy device and L/min;sternal  -KG     Barriers to Rehab medically complex  -KG       Row Name 01/31/25 1303          Living Environment    People in Home alone  daughter lives across the street  -KG       Row Name 01/31/25 1303          Home Main Entrance    Number of Stairs, Main Entrance none  -KG       Row Name 01/31/25 1303          Stairs Within Home, Primary    Number of Stairs, Within Home, Primary none  -KG       Row Name 01/31/25 1303          Cognition    Orientation Status (Cognition) oriented x 3  -KG       Row Name 01/31/25 1303          Safety Issues/Impairments Affecting Functional Mobility    Safety Issues Affecting Function (Mobility) awareness of need for assistance;insight into deficits/self-awareness;safety precaution awareness;safety precautions follow-through/compliance;sequencing abilities  -KG     Impairments Affecting Function (Mobility) balance;coordination;endurance/activity tolerance;postural/trunk control;pain;strength;shortness of breath  -KG               User Key  (r) = Recorded By, (t) = Taken By, (c) = Cosigned By      Initials Name Provider Type    KG Patrica Rodriguez, PT Physical Therapist                   Mobility       Row Name 01/31/25 1305          Bed Mobility    Comment, (Bed Mobility) UIC  -KG       Row Name 01/31/25 1305          Transfers    Comment, (Transfers) VC's for sequencing and safe hand placement to maintain sternal precautions.  -KG       Row Name 01/31/25 1305           Sit-Stand Transfer    Sit-Stand Becker (Transfers) minimum assist (75% patient effort);2 person assist;verbal cues  -KG       Row Name 01/31/25 1305          Gait/Stairs (Locomotion)    Becker Level (Gait) minimum assist (75% patient effort);2 person assist;1 person to manage equipment;verbal cues  chair follow  -KG     Assistive Device (Gait) other (see comments)  B UE support on tripod monitor  -KG     Distance in Feet (Gait) 200  -KG     Deviations/Abnormal Patterns (Gait) bilateral deviations;base of support, narrow;jad decreased;stride length decreased  -KG     Bilateral Gait Deviations forward flexed posture;heel strike decreased  -KG     Comment, (Gait/Stairs) Pt demonstrated step through gait pattern with slow jad and decreased step length. Frequent cues for upright posture with forward gaze. Pt demonstrated increased balance and stability this session. Chair follow for safety, but pt did not require any rest breaks. Limited by fatigue.  -KG               User Key  (r) = Recorded By, (t) = Taken By, (c) = Cosigned By      Initials Name Provider Type    KG Patrica Rodriguez, PT Physical Therapist                   Obj/Interventions       Row Name 01/31/25 1306          Balance    Balance Assessment sitting static balance;standing static balance;standing dynamic balance  -KG     Static Sitting Balance contact guard  -KG     Position, Sitting Balance unsupported;sitting in chair  -KG     Static Standing Balance minimal assist;2-person assist  -KG     Dynamic Standing Balance minimal assist;2-person assist  -KG     Position/Device Used, Standing Balance supported  -KG               User Key  (r) = Recorded By, (t) = Taken By, (c) = Cosigned By      Initials Name Provider Type    KG Patrica Rodriguez, PT Physical Therapist                   Goals/Plan    No documentation.                  Clinical Impression       Row Name 01/31/25 9608          Pain    Additional Documentation Pain  Scale: FACES Pre/Post-Treatment (Group)  -KG       Row Name 01/31/25 1306          Pain Scale: FACES Pre/Post-Treatment    Pain: FACES Scale, Pretreatment 4-->hurts little more  -KG     Posttreatment Pain Rating 4-->hurts little more  -KG       Row Name 01/31/25 1306          Plan of Care Review    Plan of Care Reviewed With patient  -KG     Progress improving  -KG     Outcome Evaluation Pt increased ambulation distance to 200ft with Riky x2 +1 and B UE support on tripod monitor. Chair follow for safety. Frequent cues for upright posture with forward gaze and increased stride length. Pt demonstrated improved balance and stability. Ambulation distance limited by fatigue. Continue to recommend PT skilled care and D/C home with assistance and  PT services.  -KG       Row Name 01/31/25 1306          Vital Signs    Pre Systolic BP Rehab 147  -KG     Pre Treatment Diastolic BP 71  -KG     Post Systolic BP Rehab 132  -KG     Post Treatment Diastolic BP 57  -KG     Pretreatment Heart Rate (beats/min) 82  -KG     Posttreatment Heart Rate (beats/min) 82  -KG     Pre SpO2 (%) 96  -KG     O2 Delivery Pre Treatment supplemental O2  -KG     Post SpO2 (%) 95  -KG     O2 Delivery Post Treatment supplemental O2  -KG     Pre Patient Position Sitting  -KG     Intra Patient Position Standing  -KG     Post Patient Position Sitting  -KG       Row Name 01/31/25 1306          Positioning and Restraints    Pre-Treatment Position sitting in chair/recliner  -KG     Post Treatment Position chair  -KG     In Chair reclined;call light within reach;encouraged to call for assist;with nsg;RUE elevated;LUE elevated;legs elevated  -KG               User Key  (r) = Recorded By, (t) = Taken By, (c) = Cosigned By      Initials Name Provider Type    KG Patrica Rodriguez, PT Physical Therapist                   Outcome Measures       Row Name 01/31/25 1308          How much help from another person do you currently need...    Turning from your back  to your side while in flat bed without using bedrails? 2  -KG     Moving from lying on back to sitting on the side of a flat bed without bedrails? 2  -KG     Moving to and from a bed to a chair (including a wheelchair)? 3  -KG     Standing up from a chair using your arms (e.g., wheelchair, bedside chair)? 3  -KG     Climbing 3-5 steps with a railing? 2  -KG     To walk in hospital room? 2  -KG     AM-PAC 6 Clicks Score (PT) 14  -KG     Highest Level of Mobility Goal 4 --> Transfer to chair/commode  -KG       Row Name 01/31/25 1314 01/31/25 1308       Functional Assessment    Outcome Measure Options AM-PAC 6 Clicks Basic Mobility (PT)  -KG AM-PAC 6 Clicks Basic Mobility (PT)  -KG              User Key  (r) = Recorded By, (t) = Taken By, (c) = Cosigned By      Initials Name Provider Type    Patrica Piña PT Physical Therapist                                 Physical Therapy Education       Title: PT OT SLP Therapies (In Progress)       Topic: Physical Therapy (In Progress)       Point: Mobility training (In Progress)       Learning Progress Summary            Patient Acceptance, E, NR by KG at 1/31/2025 0901    Acceptance, E, NR by KG at 1/30/2025 1449                      Point: Home exercise program (In Progress)       Learning Progress Summary            Patient Acceptance, E, NR by KG at 1/31/2025 0901                      Point: Body mechanics (In Progress)       Learning Progress Summary            Patient Acceptance, E, NR by KG at 1/31/2025 0901    Acceptance, E, NR by KG at 1/30/2025 1449                      Point: Precautions (In Progress)       Learning Progress Summary            Patient Acceptance, E, NR by KG at 1/31/2025 0901    Acceptance, E, NR by KG at 1/30/2025 1449                                      User Key       Initials Effective Dates Name Provider Type Discipline    ALINA 05/22/20 -  Patrica Rodriguez PT Physical Therapist PT                  PT Recommendation and  Plan  Planned Therapy Interventions (PT): balance training, bed mobility training, gait training, strengthening, transfer training  Progress: improving  Outcome Evaluation: Pt increased ambulation distance to 200ft with Riky x2 +1 and B UE support on tripod monitor. Chair follow for safety. Frequent cues for upright posture with forward gaze and increased stride length. Pt demonstrated improved balance and stability. Ambulation distance limited by fatigue. Continue to recommend PT skilled care and D/C home with assistance and  PT services.     Time Calculation:   PT Evaluation Complexity  History, PT Evaluation Complexity: 3 or more personal factors and/or comorbidities  Examination of Body Systems (PT Eval Complexity): total of 3 or more elements  Clinical Presentation (PT Evaluation Complexity): evolving  Clinical Decision Making (PT Evaluation Complexity): moderate complexity  Overall Complexity (PT Evaluation Complexity): moderate complexity     PT Charges       Row Name 01/31/25 0901             Time Calculation    Start Time 0901  -KG      PT Received On 01/31/25  -KG      PT Goal Re-Cert Due Date 02/09/25  -KG         Time Calculation- PT    Total Timed Code Minutes- PT 23 minute(s)  -KG         Timed Charges    70838 - PT Therapeutic Activity Minutes 23  -KG         Total Minutes    Timed Charges Total Minutes 23  -KG       Total Minutes 23  -KG                User Key  (r) = Recorded By, (t) = Taken By, (c) = Cosigned By      Initials Name Provider Type    KG Patrica Rodriguez, PT Physical Therapist                  Therapy Charges for Today       Code Description Service Date Service Provider Modifiers Qty    99281038361  PT THERAPEUTIC ACT EA 15 MIN 1/30/2025 Patrica Rodriguez, PT GP 1    07370897795 HC PT THER SUPP EA 15 MIN 1/30/2025 Patrica Rodriguez, PT GP 3    [id number] HC PT EVAL MOD COMPLEXITY 4 1/30/2025 Patrica Rodriguez, PT GP 1    [id number]  PT THERAPEUTIC ACT EA 15  MIN 1/31/2025 Patrica Rodriguez, PT GP 2            PT G-Codes  Outcome Measure Options: AM-PAC 6 Clicks Basic Mobility (PT)  AM-PAC 6 Clicks Score (PT): 14  PT Discharge Summary  Anticipated Discharge Disposition (PT): home with assist, home with home health    Erica Rodriguez, PT  1/31/2025

## 2025-01-31 NOTE — PLAN OF CARE
Goal Outcome Evaluation:  Plan of Care Reviewed With: patient        Progress: improving  Outcome Evaluation: Pt increased ambulation distance to 200ft with Riky x2 +1 and B UE support on tripod monitor. Chair follow for safety. Frequent cues for upright posture with forward gaze and increased stride length. Pt demonstrated improved balance and stability. Ambulation distance limited by fatigue. Continue to recommend PT skilled care and D/C home with assistance and  PT services.    Anticipated Discharge Disposition (PT): home with assist, home with home health

## 2025-02-01 ENCOUNTER — APPOINTMENT (OUTPATIENT)
Dept: GENERAL RADIOLOGY | Facility: HOSPITAL | Age: 80
DRG: 235 | End: 2025-02-01
Payer: MEDICARE

## 2025-02-01 PROBLEM — I25.10 CAD (CORONARY ARTERY DISEASE): Status: RESOLVED | Noted: 2025-01-28 | Resolved: 2025-02-01

## 2025-02-01 PROBLEM — H25.12 NUCLEAR SCLEROTIC CATARACT OF LEFT EYE: Status: RESOLVED | Noted: 2023-06-14 | Resolved: 2025-02-01

## 2025-02-01 PROBLEM — I10 HYPERTENSION: Status: ACTIVE | Noted: 2022-02-21

## 2025-02-01 PROBLEM — E78.5 HYPERLIPIDEMIA: Status: ACTIVE | Noted: 2022-02-21

## 2025-02-01 PROBLEM — E11.9 TYPE 2 DIABETES MELLITUS: Chronic | Status: ACTIVE | Noted: 2022-02-21

## 2025-02-01 LAB
ANION GAP SERPL CALCULATED.3IONS-SCNC: 12 MMOL/L (ref 5–15)
BASOPHILS # BLD AUTO: 0.03 10*3/MM3 (ref 0–0.2)
BASOPHILS NFR BLD AUTO: 0.4 % (ref 0–1.5)
BUN SERPL-MCNC: 29 MG/DL (ref 8–23)
BUN/CREAT SERPL: 28.2 (ref 7–25)
CALCIUM SPEC-SCNC: 7.9 MG/DL (ref 8.6–10.5)
CHLORIDE SERPL-SCNC: 102 MMOL/L (ref 98–107)
CO2 SERPL-SCNC: 22 MMOL/L (ref 22–29)
CREAT SERPL-MCNC: 1.03 MG/DL (ref 0.57–1)
DEPRECATED RDW RBC AUTO: 48.6 FL (ref 37–54)
EGFRCR SERPLBLD CKD-EPI 2021: 56.1 ML/MIN/1.73
EOSINOPHIL # BLD AUTO: 0.08 10*3/MM3 (ref 0–0.4)
EOSINOPHIL NFR BLD AUTO: 1 % (ref 0.3–6.2)
ERYTHROCYTE [DISTWIDTH] IN BLOOD BY AUTOMATED COUNT: 13.9 % (ref 12.3–15.4)
GLUCOSE BLDC GLUCOMTR-MCNC: 218 MG/DL (ref 70–130)
GLUCOSE BLDC GLUCOMTR-MCNC: 249 MG/DL (ref 70–130)
GLUCOSE BLDC GLUCOMTR-MCNC: 254 MG/DL (ref 70–130)
GLUCOSE BLDC GLUCOMTR-MCNC: 357 MG/DL (ref 70–130)
GLUCOSE SERPL-MCNC: 286 MG/DL (ref 65–99)
HCT VFR BLD AUTO: 26.7 % (ref 34–46.6)
HGB BLD-MCNC: 8.4 G/DL (ref 12–15.9)
IMM GRANULOCYTES # BLD AUTO: 0.04 10*3/MM3 (ref 0–0.05)
IMM GRANULOCYTES NFR BLD AUTO: 0.5 % (ref 0–0.5)
LYMPHOCYTES # BLD AUTO: 1.24 10*3/MM3 (ref 0.7–3.1)
LYMPHOCYTES NFR BLD AUTO: 15.3 % (ref 19.6–45.3)
MAGNESIUM SERPL-MCNC: 2.2 MG/DL (ref 1.6–2.4)
MCH RBC QN AUTO: 29.7 PG (ref 26.6–33)
MCHC RBC AUTO-ENTMCNC: 31.5 G/DL (ref 31.5–35.7)
MCV RBC AUTO: 94.3 FL (ref 79–97)
MONOCYTES # BLD AUTO: 0.55 10*3/MM3 (ref 0.1–0.9)
MONOCYTES NFR BLD AUTO: 6.8 % (ref 5–12)
NEUTROPHILS NFR BLD AUTO: 6.18 10*3/MM3 (ref 1.7–7)
NEUTROPHILS NFR BLD AUTO: 76 % (ref 42.7–76)
NRBC BLD AUTO-RTO: 0 /100 WBC (ref 0–0.2)
PHOSPHATE SERPL-MCNC: 2.5 MG/DL (ref 2.5–4.5)
PLATELET # BLD AUTO: 164 10*3/MM3 (ref 140–450)
PMV BLD AUTO: 11.6 FL (ref 6–12)
POTASSIUM SERPL-SCNC: 3.4 MMOL/L (ref 3.5–5.2)
POTASSIUM SERPL-SCNC: 5.1 MMOL/L (ref 3.5–5.2)
RBC # BLD AUTO: 2.83 10*6/MM3 (ref 3.77–5.28)
SODIUM SERPL-SCNC: 136 MMOL/L (ref 136–145)
WBC NRBC COR # BLD AUTO: 8.12 10*3/MM3 (ref 3.4–10.8)

## 2025-02-01 PROCEDURE — 71045 X-RAY EXAM CHEST 1 VIEW: CPT

## 2025-02-01 PROCEDURE — 97530 THERAPEUTIC ACTIVITIES: CPT

## 2025-02-01 PROCEDURE — 80048 BASIC METABOLIC PNL TOTAL CA: CPT | Performed by: PHYSICIAN ASSISTANT

## 2025-02-01 PROCEDURE — 83735 ASSAY OF MAGNESIUM: CPT | Performed by: INTERNAL MEDICINE

## 2025-02-01 PROCEDURE — 82948 REAGENT STRIP/BLOOD GLUCOSE: CPT

## 2025-02-01 PROCEDURE — 63710000001 INSULIN LISPRO (HUMAN) PER 5 UNITS: Performed by: PHYSICIAN ASSISTANT

## 2025-02-01 PROCEDURE — 99232 SBSQ HOSP IP/OBS MODERATE 35: CPT | Performed by: INTERNAL MEDICINE

## 2025-02-01 PROCEDURE — 63710000001 INSULIN GLARGINE PER 5 UNITS: Performed by: INTERNAL MEDICINE

## 2025-02-01 PROCEDURE — 84132 ASSAY OF SERUM POTASSIUM: CPT | Performed by: THORACIC SURGERY (CARDIOTHORACIC VASCULAR SURGERY)

## 2025-02-01 PROCEDURE — 85025 COMPLETE CBC W/AUTO DIFF WBC: CPT | Performed by: INTERNAL MEDICINE

## 2025-02-01 PROCEDURE — 84100 ASSAY OF PHOSPHORUS: CPT | Performed by: INTERNAL MEDICINE

## 2025-02-01 RX ORDER — PANTOPRAZOLE SODIUM 40 MG/1
40 TABLET, DELAYED RELEASE ORAL
Status: DISCONTINUED | OUTPATIENT
Start: 2025-02-02 | End: 2025-02-03

## 2025-02-01 RX ORDER — POTASSIUM CHLORIDE 1.5 G/1.58G
40 POWDER, FOR SOLUTION ORAL EVERY 4 HOURS
Status: COMPLETED | OUTPATIENT
Start: 2025-02-01 | End: 2025-02-01

## 2025-02-01 RX ADMIN — INSULIN GLARGINE 10 UNITS: 100 INJECTION, SOLUTION SUBCUTANEOUS at 08:44

## 2025-02-01 RX ADMIN — MUPIROCIN 1 APPLICATION: 20 OINTMENT TOPICAL at 20:25

## 2025-02-01 RX ADMIN — Medication 10 ML: at 20:27

## 2025-02-01 RX ADMIN — GABAPENTIN 100 MG: 100 CAPSULE ORAL at 17:30

## 2025-02-01 RX ADMIN — POTASSIUM CHLORIDE 40 MEQ: 1.5 POWDER, FOR SOLUTION ORAL at 05:13

## 2025-02-01 RX ADMIN — Medication 12.5 MG: at 20:25

## 2025-02-01 RX ADMIN — ATORVASTATIN CALCIUM 40 MG: 40 TABLET, FILM COATED ORAL at 20:25

## 2025-02-01 RX ADMIN — MUPIROCIN 1 APPLICATION: 20 OINTMENT TOPICAL at 08:45

## 2025-02-01 RX ADMIN — ASPIRIN 325 MG: 325 TABLET ORAL at 08:45

## 2025-02-01 RX ADMIN — INSULIN LISPRO 3 UNITS: 100 INJECTION, SOLUTION INTRAVENOUS; SUBCUTANEOUS at 08:44

## 2025-02-01 RX ADMIN — POTASSIUM CHLORIDE 40 MEQ: 1.5 POWDER, FOR SOLUTION ORAL at 08:44

## 2025-02-01 RX ADMIN — INSULIN LISPRO 4 UNITS: 100 INJECTION, SOLUTION INTRAVENOUS; SUBCUTANEOUS at 17:30

## 2025-02-01 RX ADMIN — GABAPENTIN 100 MG: 100 CAPSULE ORAL at 08:48

## 2025-02-01 RX ADMIN — Medication 12.5 MG: at 08:44

## 2025-02-01 RX ADMIN — PANTOPRAZOLE SODIUM 40 MG: 40 INJECTION, POWDER, FOR SOLUTION INTRAVENOUS at 05:14

## 2025-02-01 RX ADMIN — Medication 10 ML: at 08:46

## 2025-02-01 RX ADMIN — GABAPENTIN 100 MG: 100 CAPSULE ORAL at 20:25

## 2025-02-01 RX ADMIN — ACETAMINOPHEN 1000 MG: 500 TABLET, FILM COATED ORAL at 13:12

## 2025-02-01 RX ADMIN — INSULIN LISPRO 6 UNITS: 100 INJECTION, SOLUTION INTRAVENOUS; SUBCUTANEOUS at 20:25

## 2025-02-01 RX ADMIN — INSULIN LISPRO 3 UNITS: 100 INJECTION, SOLUTION INTRAVENOUS; SUBCUTANEOUS at 13:10

## 2025-02-01 RX ADMIN — ACETAMINOPHEN 1000 MG: 500 TABLET, FILM COATED ORAL at 05:13

## 2025-02-01 RX ADMIN — EMPAGLIFLOZIN 10 MG: 10 TABLET, FILM COATED ORAL at 17:30

## 2025-02-01 NOTE — PROGRESS NOTES
[facility]  INPATIENT PROGRESS NOTE         [facility]    2/1/2025      PATIENT IDENTIFICATION:   Name:  Abena Washington      MRN:  [id number]     79 y.o.  female             Reason for visit: Chronic systolic heart failure, CAD, hypertension, hyperlipidemia      SUBJECTIVE:   Stable/improving today up in chair, eager to get up and move     OBJECTIVE:  Vitals:    02/01/25 1000 02/01/25 1100 02/01/25 1200 02/01/25 1300   BP: 96/50 99/47 101/51 141/92   BP Location: Left arm  Left arm    Patient Position: Sitting  Sitting    Pulse: 62 60 61 63   Resp: 18  16    Temp:   97.4 °F (36.3 °C)    TempSrc:   Axillary    SpO2: 94% 90% 91% 90%   Weight:       Height:         FiO2 (%): 40 %     Body mass index is 22.06 kg/m².    Intake/Output Summary (Last 24 hours) at 2/1/2025 1534  Last data filed at 2/1/2025 1200  Gross per 24 hour   Intake 240 ml   Output 1055 ml   Net -815 ml       Telemetry: Personally reviewed, normal sinus rhythm, no arrhythmia     Exam:  General Appearance:   · well developed  · well nourished  Neck:  · thyroid not enlarged  · supple  Respiratory:  · no respiratory distress  · normal breath sounds  · no rales  Cardiovascular:  · no jugular venous distention  · regular rhythm  · apical impulse normal  · S1 normal, S2 normal  · no S3, no S4   · no murmur  · no rub, no thrill  · carotid pulses normal; no bruit  · pedal pulses normal  · lower extremity edema: none    Skin:   warm, dry          No Known Allergies  Scheduled meds:       acetaminophen, 1,000 mg, Oral, Q8H  aspirin, 325 mg, Oral, Daily  atorvastatin, 40 mg, Oral, Nightly  gabapentin, 100 mg, Oral, TID  insulin glargine, 10 Units, Subcutaneous, Daily  insulin lispro, 2-7 Units, Subcutaneous, 4x Daily AC & at Bedtime  metoprolol tartrate, 12.5 mg, Oral, Q12H  mupirocin, 1 Application, Each Nare, BID  [START ON 2/2/2025] pantoprazole, 40 mg, Oral, Q AM  pharmacy consult - MTM, , Not  "Applicable, Daily  Scopolamine, 1 patch, Transdermal, Q72H  senna-docusate sodium, 2 tablet, Oral, BID  sodium chloride, 10 mL, Intravenous, Q12H      IV meds:                      norepinephrine, 0.02-0.3 mcg/kg/min, Last Rate: Stopped (01/31/25 0630)      Data Review:  Results from last 7 days   Lab Units 02/01/25  0305 01/31/25  0600 01/30/25  0334 01/29/25  0314   SODIUM mmol/L 136 139 142 145   BUN mg/dL 29* 22 18 20   CREATININE mg/dL 1.03* 1.01* 1.26* 1.32*   GLUCOSE mg/dL 286* 149* 156* 191*     Results from last 7 days   Lab Units 02/01/25  0305 01/31/25  0600 01/30/25  1224 01/30/25  0334 01/29/25  0314 01/28/25  1755   WBC 10*3/mm3 8.12 11.12*  --  11.28* 11.20* 7.82   HEMOGLOBIN g/dL 8.4* 7.6* 8.2* 7.7* 8.3* 8.0*     Results from last 7 days   Lab Units 01/29/25  0314 01/28/25  1321 01/27/25  1310   INR  1.24* 1.43* 0.91     Results from last 7 days   Lab Units 01/29/25  0314 01/27/25  1310   ALT (SGPT) U/L 23 14   AST (SGOT) U/L 49* 15     No results found for: \"DIGOXIN\"   No results found for: \"TSH\"        Invalid input(s): \"LDLCALC\"    Estimated Creatinine Clearance: 39.5 mL/min (A) (by C-G formula based on SCr of 1.03 mg/dL (H)).        Imaging (last 24 hr):   I personally reviewed the most recent chest x-ray and other pertinent imaging studies.  Results for orders placed during the hospital encounter of 01/28/25    XR Chest 1 View    Narrative  XR CHEST 1 VW    Date of Exam: 2/1/2025 12:28 AM EST    Indication: postop    Comparison: January 31, 2025    Findings:  There are thoracotomy tubes present. Sternotomy wires are noted. The heart looks enlarged. There are densities in the lower lungs which could relate to atelectasis. There are no large pleural effusions.    Impression  Impression:  1.Cardiomegaly.  2.Bibasilar densities which could relate to atelectasis.        Electronically Signed: Edenilson Grossman MD  2/1/2025 8:13 AM EST  Workstation ID: [id number]        Last ECHO:  Results for orders placed " in visit on 01/28/25    Intra-Op Anesthesia EMRE  1/28/2025    Echocardiogram Comments:  Moderately impaired lv function  EF estimated at 38%  [place] of LV is akinetic/dyskinetic  Nl valvular function  Images recorded of RADHA        PROBLEM LIST:     CAD s/p CABG x 5 1/28/2025    Hypertension    Hyperlipidemia    Type 2 diabetes mellitus        Initial cardiac assessment: 79-year-old female found to have multivessel CAD at cath in October [facility] went to Dr. Del Castillo for second opinion, status post 5V CABG 1/28/2025, postop EF 38%.    ASSESSMENT/PLAN:  1.  CAD:  Status post 5V CABG  Continue aspirin and statin  Continue metoprolol low-dose  Doing well off midodrine now    Hold off on Bumex today, will evaluate daily    2.  Acute on chronic systolic heart failure, EF 40-45%:  Fairly euvolemic currently, EF on EMRE immediately postop 38%  Echo 1/31/25 EF 40-45%, with normal valves  Started Jardiance 10 mg daily 2/1/2025  Continue beta-blocker    3.  Acute on CKD:  Maintain adequate perfusing pressure  Renal function remains normal      4.  Hyperlipidemia:  Goal LDL less than 70, continue atorvastatin 40 mg daily    Discussed with patient      Sravan Arboleda MD  2/1/2025    15:34 EST

## 2025-02-01 NOTE — PLAN OF CARE
Goal Outcome Evaluation:  Plan of Care Reviewed With: patient        Progress: improving  Outcome Evaluation: Pt continues to present with decreased functional mobility and decreased activity tolerance compared to baseline. Pt ambulated 220ft with CGA and BUE support. Continue to progress per pt tolerance.    Anticipated Discharge Disposition (PT): home with assist, home with home health

## 2025-02-01 NOTE — THERAPY TREATMENT NOTE
Patient Name: Abena Washington  : 1945    MRN: [id number]                              Today's Date: 2025       Admit Date: 2025    Visit Dx:     ICD-10-CM ICD-9-CM   1. Coronary artery disease involving native coronary artery of native heart with angina pectoris  I25.119 414.01     413.9   2. Coronary artery disease involving native coronary artery of native heart, unspecified whether angina present  I25.10 414.01     Patient Active Problem List   Diagnosis    CAD s/p CABG x 5 2025    Hypertension    Hyperlipidemia    Type 2 diabetes mellitus     Past Medical History:   Diagnosis Date    Anemia     Anxiety and depression     Arthritis     Cataracts, bilateral     Coronary artery disease     Delayed emergence from anesthesia     Diabetes mellitus     Heart attack     Hyperlipidemia     Hypertension     Nausea     Peripheral neuropathy     PONV (postoperative nausea and vomiting)     Skin melanoma     Wears glasses      Past Surgical History:   Procedure Laterality Date    CARDIAC CATHETERIZATION      CATARACT EXTRACTION W/ INTRAOCULAR LENS IMPLANT Left 2023    Procedure: CATARACT PHACO EXTRACTION WITH INTRAOCULAR LENS IMPLANT LEFT;  Surgeon: Mina Hendrickson MD;  Location: [facility] OR;  Service: Ophthalmology;  Laterality: Left;    CATARACT EXTRACTION W/ INTRAOCULAR LENS IMPLANT Right 2023    Procedure: CATARACT PHACO EXTRACTION WITH INTRAOCULAR LENS IMPLANT RIGHT;  Surgeon: Mina Hendrickson MD;  Location: [facility] OR;  Service: Ophthalmology;  Laterality: Right;    CHOLECYSTECTOMY      COLONOSCOPY      CORONARY ARTERY BYPASS GRAFT N/A 2025    Procedure: MEDIAN STERNOTOMY, CORONARY ARTERY BYPASS GRAFTING X 5, UTILIZING THE LEFT INTERNAL MAMMARY ARTERY, ENDOSCOPIC VEIN HARVESTING OF THE LEFT SAPHENOUS VEIN, TRANSESOPHAGEAL ECHOCARDIOGRAM WITH ANESTHESIA;  Surgeon: Zeferino Del Castillo MD;  Location: [facility] OR;  Service: Cardiothoracic;  Laterality: N/A;    ENDOSCOPY       HYSTERECTOMY      SKIN CANCER EXCISION      TUBAL ABDOMINAL LIGATION        General Information       Row Name 02/01/25 1304          Physical Therapy Time and Intention    Document Type therapy note (daily note)  -AE     Mode of Treatment physical therapy  -AE       Row Name 02/01/25 1304          General Information    Patient Profile Reviewed yes  -AE     Existing Precautions/Restrictions cardiac;fall;oxygen therapy device and L/min;sternal  -AE     Barriers to Rehab medically complex  -AE       Row Name 02/01/25 1304          Cognition    Orientation Status (Cognition) oriented x 3  -AE       Row Name 02/01/25 1304          Safety Issues/Impairments Affecting Functional Mobility    Safety Issues Affecting Function (Mobility) awareness of need for assistance;insight into deficits/self-awareness;safety precaution awareness;safety precautions follow-through/compliance;sequencing abilities  -AE     Impairments Affecting Function (Mobility) balance;endurance/activity tolerance;pain;strength;shortness of breath  -AE               User Key  (r) = Recorded By, (t) = Taken By, (c) = Cosigned By      Initials Name Provider Type    AE Bryce Castillo PT Physical Therapist                   Mobility       Row Name 02/01/25 1304          Bed Mobility    Comment, (Bed Mobility) Pt received and left UI.  -AE       Row Name 02/01/25 1304          Transfers    Comment, (Transfers) VCs for hand placement and sequencing.  -AE       Row Name 02/01/25 1304          Sit-Stand Transfer    Sit-Stand Harper (Transfers) minimum assist (75% patient effort);1 person assist;verbal cues  -AE       Row Name 02/01/25 1304          Gait/Stairs (Locomotion)    Harper Level (Gait) contact guard;verbal cues  -AE     Assistive Device (Gait) other (see comments)  BUE support on tele monitor  -AE     Distance in Feet (Gait) 220  -AE     Deviations/Abnormal Patterns (Gait) bilateral deviations;base of support, narrow;jad  decreased;stride length decreased  -AE     Bilateral Gait Deviations forward flexed posture;heel strike decreased  -AE     Comment, (Gait/Stairs) Pt demo step through gait pattern with slowed jad and decreased gait speed with mild flexed posture. Pt demo improved upright posture and sequencing of AD this session. Further distance limited by fatigue.  -AE               User Key  (r) = Recorded By, (t) = Taken By, (c) = Cosigned By      Initials Name Provider Type    AE Bryce Castillo PT Physical Therapist                   Obj/Interventions       Row Name 02/01/25 1308          Balance    Balance Assessment sitting static balance;standing static balance;standing dynamic balance;sitting dynamic balance  -AE     Static Sitting Balance contact guard  -AE     Dynamic Sitting Balance contact guard  -AE     Position, Sitting Balance unsupported;sitting in chair  -AE     Static Standing Balance contact guard;verbal cues  -AE     Dynamic Standing Balance contact guard;verbal cues  -AE     Position/Device Used, Standing Balance supported  -AE               User Key  (r) = Recorded By, (t) = Taken By, (c) = Cosigned By      Initials Name Provider Type    AE Bryce Castillo, PT Physical Therapist                   Goals/Plan    No documentation.                  Clinical Impression       Row Name 02/01/25 1309          Pain    Pain Location chest  -AE     Pain Side/Orientation generalized  -AE     Pain Management Interventions activity modification encouraged;exercise or physical activity utilized  -AE     Response to Pain Interventions activity participation with tolerable pain  -AE       Row Name 02/01/25 1309          Pain Scale: FACES Pre/Post-Treatment    Pain: FACES Scale, Pretreatment 2-->hurts little bit  -AE     Posttreatment Pain Rating 2-->hurts little bit  -AE       Row Name 02/01/25 1309          Plan of Care Review    Plan of Care Reviewed With patient  -AE     Progress improving  -AE     Outcome  Evaluation Pt continues to present with decreased functional mobility and decreased activity tolerance compared to baseline. Pt ambulated 220ft with CGA and BUE support. Continue to progress per pt tolerance.  -AE       Row Name 02/01/25 1309          Vital Signs    Pre Systolic BP Rehab 104  -AE     Pre Treatment Diastolic BP 61  -AE     Post Systolic BP Rehab 113  -AE     Post Treatment Diastolic BP 60  -AE     Pretreatment Heart Rate (beats/min) 72  -AE     Posttreatment Heart Rate (beats/min) 71  -AE     O2 Delivery Pre Treatment room air  -AE     Intra SpO2 (%) 91  -AE     O2 Delivery Intra Treatment room air  -AE     Post SpO2 (%) 92  -AE     O2 Delivery Post Treatment room air  -AE     Pre Patient Position Sitting  -AE     Intra Patient Position Standing  -AE     Post Patient Position Sitting  -AE       Row Name 02/01/25 1309          Positioning and Restraints    Pre-Treatment Position sitting in chair/recliner  -AE     Post Treatment Position chair  -AE     In Chair notified nsg;reclined;call light within reach;encouraged to call for assist;waffle cushion;legs elevated;RUE elevated;LUE elevated  -AE               User Key  (r) = Recorded By, (t) = Taken By, (c) = Cosigned By      Initials Name Provider Type    AE Bryce Castillo, PT Physical Therapist                   Outcome Measures       Row Name 02/01/25 1311          How much help from another person do you currently need...    Turning from your back to your side while in flat bed without using bedrails? 3  -AE     Moving from lying on back to sitting on the side of a flat bed without bedrails? 2  -AE     Moving to and from a bed to a chair (including a wheelchair)? 3  -AE     Standing up from a chair using your arms (e.g., wheelchair, bedside chair)? 3  -AE     Climbing 3-5 steps with a railing? 2  -AE     To walk in hospital room? 3  -AE     AM-PAC 6 Clicks Score (PT) 16  -AE     Highest Level of Mobility Goal 5 --> Static standing  -AE       Row  Name 02/01/25 1311          Functional Assessment    Outcome Measure Options AM-PAC 6 Clicks Basic Mobility (PT)  -AE               User Key  (r) = Recorded By, (t) = Taken By, (c) = Cosigned By      Initials Name Provider Type    AE Bryce Castillo, PT Physical Therapist                                 Physical Therapy Education       Title: PT OT SLP Therapies (In Progress)       Topic: Physical Therapy (In Progress)       Point: Mobility training (In Progress)       Learning Progress Summary            Patient Acceptance, E, NR by AE at 2/1/2025 0813    Acceptance, E, NR by KG at 1/31/2025 0901    Acceptance, E, NR by KG at 1/30/2025 1449                      Point: Home exercise program (In Progress)       Learning Progress Summary            Patient Acceptance, E, NR by AE at 2/1/2025 0813    Acceptance, E, NR by KG at 1/31/2025 0901                      Point: Body mechanics (In Progress)       Learning Progress Summary            Patient Acceptance, E, NR by AE at 2/1/2025 0813    Acceptance, E, NR by KG at 1/31/2025 0901    Acceptance, E, NR by KG at 1/30/2025 1449                      Point: Precautions (In Progress)       Learning Progress Summary            Patient Acceptance, E, NR by AE at 2/1/2025 0813    Acceptance, E, NR by KG at 1/31/2025 0901    Acceptance, E, NR by KG at 1/30/2025 1449                                      User Key       Initials Effective Dates Name Provider Type Discipline    KG 05/22/20 -  Patrica Rodriguez PT Physical Therapist PT    AE 09/21/21 -  Bryce Castillo PT Physical Therapist PT                  PT Recommendation and Plan     Progress: improving  Outcome Evaluation: Pt continues to present with decreased functional mobility and decreased activity tolerance compared to baseline. Pt ambulated 220ft with CGA and BUE support. Continue to progress per pt tolerance.     Time Calculation:         PT Charges       Row Name 02/01/25 3573             Time Calculation     Start Time 0813  -AE      PT Received On 02/01/25  -AE      PT Goal Re-Cert Due Date 02/09/25  -AE         Timed Charges    81562 - PT Therapeutic Activity Minutes 24  -AE         Total Minutes    Timed Charges Total Minutes 24  -AE       Total Minutes 24  -AE                User Key  (r) = Recorded By, (t) = Taken By, (c) = Cosigned By      Initials Name Provider Type    AE Bryce Castillo, PT Physical Therapist                  Therapy Charges for Today       Code Description Service Date Service Provider Modifiers Qty    29562020880 HC PT THERAPEUTIC ACT EA 15 MIN 2/1/2025 Bryce Castillo, PT GP 2            PT G-Codes  Outcome Measure Options: AM-PAC 6 Clicks Basic Mobility (PT)  AM-PAC 6 Clicks Score (PT): 16  AM-PAC 6 Clicks Score (OT): 15  PT Discharge Summary  Anticipated Discharge Disposition (PT): home with assist, home with home health    Bryce Castillo PT  2/1/2025

## 2025-02-01 NOTE — PROGRESS NOTES
CTS Progress Note    POD # 4 s/p CABG x 5     LOS: 4 days   Subjective  Sitting in bedside chair.  Comfortable and cooperative    Objective    Vital Signs  Temp:  [97.9 °F (36.6 °C)-98.9 °F (37.2 °C)] 97.9 °F (36.6 °C)  Heart Rate:  [67-91] 67  Resp:  [14-18] 16  BP: (100-145)/(47-75) 121/55    Physical Exam:   General Appearance: alert, appears stated age and cooperative.   Lungs: Decreased lung sounds bilateral bases   Heart: regular rhythm & normal rate, normal S1, S2 and no murmur, no gallop, no rub   Chest: sternum stable   Skin: Incision c/d/I   CT:      260 cc      Results     Results from last 7 days   Lab Units 02/01/25  0305   WBC 10*3/mm3 8.12   HEMOGLOBIN g/dL 8.4*   HEMATOCRIT % 26.7*   PLATELETS 10*3/mm3 164     Results from last 7 days   Lab Units 02/01/25  0305   SODIUM mmol/L 136   POTASSIUM mmol/L 3.4*   CHLORIDE mmol/L 102   CO2 mmol/L 22.0   BUN mg/dL 29*   CREATININE mg/dL 1.03*   GLUCOSE mg/dL 286*   CALCIUM mg/dL 7.9*       Imaging Results (Last 24 Hours)       Procedure Component Value Units Date/Time    XR Chest 1 View [[id number]] Collected: 02/01/25 0811     Updated: 02/01/25 0816    Narrative:      XR CHEST 1 VW    Date of Exam: 2/1/2025 12:28 AM EST    Indication: postop    Comparison: January 31, 2025    Findings:  There are thoracotomy tubes present. Sternotomy wires are noted. The heart looks enlarged. There are densities in the lower lungs which could relate to atelectasis. There are no large pleural effusions.      Impression:      Impression:  1.Cardiomegaly.  2.Bibasilar densities which could relate to atelectasis.        Electronically Signed: Edenilson Grossman MD    2/1/2025 8:13 AM EST    Workstation ID: [id number]            Assessment  POD # 4 s/p CABG x 5    CAD s/p CABG x 5 1/28/2025    CAD (coronary artery disease)  Bibasilar atelectasis    Plan   Continue pleural tubes   Bumex 2mg IV  Wean oxygen as tolerated  Monitor H&H  Monitor renal function. Creatinine at baseline.  Ambulate  TID  Pulmonary toilet    Agus Ramírez MD  02/01/25  08:53 EST

## 2025-02-01 NOTE — PROGRESS NOTES
Intensive Care Follow-up     Hospital:  LOS: 4 days   Ms. Abena Washington, 79 y.o. female is followed for:   Coronary artery disease involving native coronary artery of native heart            History of present illness:   79-year-old female with diabetes, hypertension, coronary disease, dyslipidemia recently seen by cardiology team with complaints of intermittent/exertional chest pressure.  Echo showed EF of 45 to 50%.  Underwent stress testing which was abnormal and underwent subsequently left heart cath which showed multivessel coronary disease.  Patient was referred to CT surgery for surgical revascularization.  Patient underwent preop workup which included bilateral carotid duplex which did not show any significant carotid disease.  Spirometry was done but results are not available.  Patient was deemed surgical candidate so underwent coronary bypass graft surgery x 5 by Dr. Del Castillo on 1/28.  Estimated blood loss was 500 mL.  Cross-clamp time of 118 minutes and bypass time of 132 minutes.  Post procedure patient is transferred to ICU.  No other intraoperative complications.  Patient did not require any blood transfusion intraoperatively.       Subjective   Interval History:  Patient doing very well this morning.  Multiple bowel movement yesterday.  No other acute issues.             The patient's past medical, surgical and social history were reviewed and updated in Epic as appropriate.       Objective     Infusions:  norepinephrine, 0.02-0.3 mcg/kg/min, Last Rate: Stopped (01/31/25 0630)      Medications:  acetaminophen, 1,000 mg, Oral, Q8H  aspirin, 325 mg, Oral, Daily  atorvastatin, 40 mg, Oral, Nightly  gabapentin, 100 mg, Oral, TID  insulin glargine, 10 Units, Subcutaneous, Daily  insulin lispro, 2-7 Units, Subcutaneous, 4x Daily AC & at Bedtime  metoprolol tartrate, 12.5 mg, Oral, Q12H  mupirocin, 1 Application, Each Nare, BID  [START ON 2/2/2025] pantoprazole, 40 mg, Oral, Q AM  pharmacy consult - [facility], ,  Not Applicable, Daily  Scopolamine, 1 patch, Transdermal, Q72H  senna-docusate sodium, 2 tablet, Oral, BID  sodium chloride, 10 mL, Intravenous, Q12H      I reviewed the patient's medications.    Vital Sign Min/Max for last 24 hours  Temp  Min: 97.1 °F (36.2 °C)  Max: 98.9 °F (37.2 °C)   BP  Min: 100/47  Max: 145/75   Pulse  Min: 64  Max: 91   Resp  Min: 14  Max: 18   SpO2  Min: 90 %  Max: 100 %   Flow (L/min) (Oxygen Therapy)  Min: 2  Max: 2       Input/Output for last 24 hour shift  01/31 0701 - 02/01 0700  In: 100 [I.V.:100]  Out: 2195 [Urine:1935]      GENERAL : NAD, conversant  RESPIRATORY/THORAX : normal respiratory effort and no intercostal retractions, CTAB  CARDIOVASCULAR : Normal S1/S2, RRR. no lower ext edema.  GASTROINTESTINAL : Soft, NT/ND. BS x 4 normoactive. No hepatosplenomegaly.  MUSCULOSKELETAL : No cyanosis, clubbing, or ischemia  NEUROLOGICAL: alert and oriented to person, place and time  PSYCHOLOGICAL : Appropriate affect    Results from last 7 days   Lab Units 02/01/25  0305 01/31/25  0600 01/30/25  1224 01/30/25  0334   WBC 10*3/mm3 8.12 11.12*  --  11.28*   HEMOGLOBIN g/dL 8.4* 7.6* 8.2* 7.7*   PLATELETS 10*3/mm3 164 134*  --  128*     Results from last 7 days   Lab Units 02/01/25  0305 01/31/25  1354 01/31/25  0600 01/30/25  1224 01/30/25  0334 01/29/25  0314 01/28/25  1755   SODIUM mmol/L 136  --  139  --  142 145 146*   POTASSIUM mmol/L 3.4* 4.2 3.9   < > 3.9 4.0 4.3   CO2 mmol/L 22.0  --  24.0  --  22.0 26.0 28.0   BUN mg/dL 29*  --  22  --  18 20 18   CREATININE mg/dL 1.03*  --  1.01*  --  1.26* 1.32* 1.18*   MAGNESIUM mg/dL 2.2  --  2.2  --   --  2.6* 2.7*   PHOSPHORUS mg/dL 2.5  --  2.3*  --   --   --  4.1   GLUCOSE mg/dL 286*  --  149*  --  156* 191* 197*    < > = values in this interval not displayed.     Estimated Creatinine Clearance: 39.5 mL/min (A) (by C-G formula based on SCr of 1.03 mg/dL (H)).    Results from last 7 days   Lab Units 01/29/25  0652   PH, ARTERIAL pH units  7.334*   PCO2, ARTERIAL mm Hg 47.4*   PO2 ART mm Hg 129.0*       I reviewed the patient's new clinical results.  I reviewed the patient's new imaging results/reports including actual images and agree with reports.       Imaging Results (Last 24 Hours)       Procedure Component Value Units Date/Time    XR Chest 1 View [[id number]] Collected: 02/01/25 0811     Updated: 02/01/25 0816    Narrative:      XR CHEST 1 VW    Date of Exam: 2/1/2025 12:28 AM EST    Indication: postop    Comparison: January 31, 2025    Findings:  There are thoracotomy tubes present. Sternotomy wires are noted. The heart looks enlarged. There are densities in the lower lungs which could relate to atelectasis. There are no large pleural effusions.      Impression:      Impression:  1.Cardiomegaly.  2.Bibasilar densities which could relate to atelectasis.        Electronically Signed: Edenilson Grossman MD    2/1/2025 8:13 AM EST    Workstation ID: [id number]            Assessment & Plan   Impression        CAD s/p CABG x 5 1/28/2025    Hypertension    Hyperlipidemia    Type 2 diabetes mellitus       Plan        79-year-old female with history of diabetes, hypertension, coronary disease, hyperlipidemia.  Who presented to the [facility] ICU status post CABG.    -ICU to follow  -Continue subcu insulin, goal blood glucose less than 180  -Aspirin/statin/beta-blocker  -Duo nebs as needed  -PT/OT to evaluate post extubation  -DVT prophylaxis   -A.m. labs    I conducted multidisciplinary rounds and the plan of care was discussed with the multidisciplinary team at that time. In attendance at multidisciplinary rounds was clinical pharmacist, dietitian, nursing staff, and case management.    I discussed the patient's findings and my recommendations with patient and nursing staff       Lynne Peng, DO  Pulmonary, Critical care and Sleep Medicine

## 2025-02-02 ENCOUNTER — APPOINTMENT (OUTPATIENT)
Dept: GENERAL RADIOLOGY | Facility: HOSPITAL | Age: 80
DRG: 235 | End: 2025-02-02
Payer: MEDICARE

## 2025-02-02 LAB
ANION GAP SERPL CALCULATED.3IONS-SCNC: 8 MMOL/L (ref 5–15)
BUN SERPL-MCNC: 31 MG/DL (ref 8–23)
BUN/CREAT SERPL: 27.4 (ref 7–25)
CALCIUM SPEC-SCNC: 8.3 MG/DL (ref 8.6–10.5)
CHLORIDE SERPL-SCNC: 104 MMOL/L (ref 98–107)
CO2 SERPL-SCNC: 26 MMOL/L (ref 22–29)
CREAT SERPL-MCNC: 1.13 MG/DL (ref 0.57–1)
DEPRECATED RDW RBC AUTO: 48.5 FL (ref 37–54)
EGFRCR SERPLBLD CKD-EPI 2021: 50.2 ML/MIN/1.73
ERYTHROCYTE [DISTWIDTH] IN BLOOD BY AUTOMATED COUNT: 14.2 % (ref 12.3–15.4)
GLUCOSE BLDC GLUCOMTR-MCNC: 167 MG/DL (ref 70–130)
GLUCOSE BLDC GLUCOMTR-MCNC: 215 MG/DL (ref 70–130)
GLUCOSE BLDC GLUCOMTR-MCNC: 261 MG/DL (ref 70–130)
GLUCOSE BLDC GLUCOMTR-MCNC: 274 MG/DL (ref 70–130)
GLUCOSE SERPL-MCNC: 173 MG/DL (ref 65–99)
HCT VFR BLD AUTO: 23.6 % (ref 34–46.6)
HGB BLD-MCNC: 7.4 G/DL (ref 12–15.9)
MCH RBC QN AUTO: 29.2 PG (ref 26.6–33)
MCHC RBC AUTO-ENTMCNC: 31.4 G/DL (ref 31.5–35.7)
MCV RBC AUTO: 93.3 FL (ref 79–97)
PLATELET # BLD AUTO: 171 10*3/MM3 (ref 140–450)
PMV BLD AUTO: 11.3 FL (ref 6–12)
POTASSIUM SERPL-SCNC: 4.5 MMOL/L (ref 3.5–5.2)
RBC # BLD AUTO: 2.53 10*6/MM3 (ref 3.77–5.28)
SODIUM SERPL-SCNC: 138 MMOL/L (ref 136–145)
WBC NRBC COR # BLD AUTO: 6.59 10*3/MM3 (ref 3.4–10.8)

## 2025-02-02 PROCEDURE — 85027 COMPLETE CBC AUTOMATED: CPT | Performed by: THORACIC SURGERY (CARDIOTHORACIC VASCULAR SURGERY)

## 2025-02-02 PROCEDURE — 63710000001 INSULIN LISPRO (HUMAN) PER 5 UNITS: Performed by: PHYSICIAN ASSISTANT

## 2025-02-02 PROCEDURE — 99232 SBSQ HOSP IP/OBS MODERATE 35: CPT | Performed by: INTERNAL MEDICINE

## 2025-02-02 PROCEDURE — 63710000001 INSULIN GLARGINE PER 5 UNITS: Performed by: INTERNAL MEDICINE

## 2025-02-02 PROCEDURE — 82948 REAGENT STRIP/BLOOD GLUCOSE: CPT

## 2025-02-02 PROCEDURE — 71045 X-RAY EXAM CHEST 1 VIEW: CPT

## 2025-02-02 PROCEDURE — 80048 BASIC METABOLIC PNL TOTAL CA: CPT | Performed by: THORACIC SURGERY (CARDIOTHORACIC VASCULAR SURGERY)

## 2025-02-02 RX ADMIN — INSULIN GLARGINE 10 UNITS: 100 INJECTION, SOLUTION SUBCUTANEOUS at 08:25

## 2025-02-02 RX ADMIN — ATORVASTATIN CALCIUM 40 MG: 40 TABLET, FILM COATED ORAL at 20:05

## 2025-02-02 RX ADMIN — INSULIN LISPRO 3 UNITS: 100 INJECTION, SOLUTION INTRAVENOUS; SUBCUTANEOUS at 13:11

## 2025-02-02 RX ADMIN — ACETAMINOPHEN 1000 MG: 500 TABLET, FILM COATED ORAL at 05:29

## 2025-02-02 RX ADMIN — ASPIRIN 325 MG: 325 TABLET ORAL at 08:25

## 2025-02-02 RX ADMIN — GABAPENTIN 100 MG: 100 CAPSULE ORAL at 08:25

## 2025-02-02 RX ADMIN — GABAPENTIN 100 MG: 100 CAPSULE ORAL at 20:04

## 2025-02-02 RX ADMIN — PANTOPRAZOLE SODIUM 40 MG: 40 TABLET, DELAYED RELEASE ORAL at 05:29

## 2025-02-02 RX ADMIN — MUPIROCIN 1 APPLICATION: 20 OINTMENT TOPICAL at 08:25

## 2025-02-02 RX ADMIN — GABAPENTIN 100 MG: 100 CAPSULE ORAL at 16:25

## 2025-02-02 RX ADMIN — HYDROCODONE BITARTRATE AND ACETAMINOPHEN 1 TABLET: 7.5; 325 TABLET ORAL at 20:04

## 2025-02-02 RX ADMIN — INSULIN LISPRO 4 UNITS: 100 INJECTION, SOLUTION INTRAVENOUS; SUBCUTANEOUS at 20:27

## 2025-02-02 RX ADMIN — Medication 10 ML: at 20:27

## 2025-02-02 RX ADMIN — INSULIN LISPRO 2 UNITS: 100 INJECTION, SOLUTION INTRAVENOUS; SUBCUTANEOUS at 08:29

## 2025-02-02 RX ADMIN — EMPAGLIFLOZIN 10 MG: 10 TABLET, FILM COATED ORAL at 08:25

## 2025-02-02 RX ADMIN — ACETAMINOPHEN 1000 MG: 500 TABLET, FILM COATED ORAL at 13:12

## 2025-02-02 RX ADMIN — Medication 12.5 MG: at 20:04

## 2025-02-02 RX ADMIN — INSULIN LISPRO 4 UNITS: 100 INJECTION, SOLUTION INTRAVENOUS; SUBCUTANEOUS at 18:28

## 2025-02-02 NOTE — PROGRESS NOTES
[facility]  INPATIENT PROGRESS NOTE         [facility]    2/2/2025      PATIENT IDENTIFICATION:   Name:  Abena Washington      MRN:  [id number]     79 y.o.  female             Reason for visit: Chronic systolic heart failure, CAD, hypertension, hyperlipidemia      SUBJECTIVE:   Resting comfortably, denies chest pain or shortness of breath, on room air currently off Levophed this morning     OBJECTIVE:  Vitals:    02/02/25 0900 02/02/25 1000 02/02/25 1100 02/02/25 1200   BP: 107/49 92/56 106/52 111/60   BP Location:       Patient Position:       Pulse: 59 66 54 55   Resp:  20     Temp:       TempSrc:       SpO2: 93% 95% 93% 93%   Weight:       Height:         FiO2 (%): 40 %     Body mass index is 22.06 kg/m².    Intake/Output Summary (Last 24 hours) at 2/2/2025 1248  Last data filed at 2/2/2025 1000  Gross per 24 hour   Intake 360 ml   Output 1460 ml   Net -1100 ml       Telemetry: Personally reviewed, normal sinus rhythm, no arrhythmia     Exam::   General Appearance:   · well developed  · well nourished  Neck:  · thyroid not enlarged  · supple  Respiratory:  · no respiratory distress  · normal breath sounds  · no rales  Cardiovascular:  · no jugular venous distention  · regular rhythm  · apical impulse normal  · S1 normal, S2 normal  · no S3, no S4   · no murmur  · no rub, no thrill  · carotid pulses normal; no bruit  · pedal pulses normal  · lower extremity edema: Trace  Skin:   warm, dry          No Known Allergies  Scheduled meds:       acetaminophen, 1,000 mg, Oral, Q8H  aspirin, 325 mg, Oral, Daily  atorvastatin, 40 mg, Oral, Nightly  empagliflozin, 10 mg, Oral, Daily  gabapentin, 100 mg, Oral, TID  insulin glargine, 10 Units, Subcutaneous, Daily  insulin lispro, 2-7 Units, Subcutaneous, 4x Daily AC & at Bedtime  metoprolol tartrate, 12.5 mg, Oral, Q12H  pantoprazole, 40 mg, Oral, Q AM  pharmacy consult - [facility], , Not Applicable, Daily  Scopolamine, 1  "patch, Transdermal, Q72H  senna-docusate sodium, 2 tablet, Oral, BID  sodium chloride, 10 mL, Intravenous, Q12H      IV meds:                      norepinephrine, 0.02-0.3 mcg/kg/min, Last Rate: Stopped (01/31/25 0630)      Data Review:  Results from last 7 days   Lab Units 02/02/25  0351 02/01/25  0305 01/31/25  0600 01/30/25  0334   SODIUM mmol/L 138 136 139 142   BUN mg/dL 31* 29* 22 18   CREATININE mg/dL 1.13* 1.03* 1.01* 1.26*   GLUCOSE mg/dL 173* 286* 149* 156*     Results from last 7 days   Lab Units 02/02/25  0351 02/01/25  0305 01/31/25  0600 01/30/25  1224 01/30/25  0334 01/29/25  0314   WBC 10*3/mm3 6.59 8.12 11.12*  --  11.28* 11.20*   HEMOGLOBIN g/dL 7.4* 8.4* 7.6* 8.2* 7.7* 8.3*     Results from last 7 days   Lab Units 01/29/25  0314 01/28/25  1321 01/27/25  1310   INR  1.24* 1.43* 0.91     Results from last 7 days   Lab Units 01/29/25  0314 01/27/25  1310   ALT (SGPT) U/L 23 14   AST (SGOT) U/L 49* 15     No results found for: \"DIGOXIN\"   No results found for: \"TSH\"        Invalid input(s): \"LDLCALC\"    Estimated Creatinine Clearance: 36 mL/min (A) (by C-G formula based on SCr of 1.13 mg/dL (H)).        Imaging (last 24 hr):   I personally reviewed the most recent chest x-ray and other pertinent imaging studies.  Results for orders placed during the hospital encounter of 01/28/25    XR Chest 1 View    Narrative  XR CHEST 1 VW    Date of Exam: 2/2/2025 5:56 AM EST    Indication: postop    Comparison: Chest radiograph 2/1/2025    Findings:  Stable cardiomediastinal silhouette. Median sternotomy and CABG. Bilateral thoracotomy tubes. There is a small left subpulmonic pneumothorax not clearly seen on prior. No visualized apical pneumothorax. Streaky perihilar and bibasilar opacities  appearance favors atelectasis grossly similar to prior exam. No overt edema or significant effusion. Blunting of the right costophrenic angle, question trace pleural fluid. Degenerative elated osseous " changes.    Impression  Impression:  Small left subpulmonic pneumothorax. Stable thoracotomy tubes.    Stable cardiomegaly and probable bibasilar predominant atelectasis.      Electronically Signed: Ryan Barragan MD  2/2/2025 7:52 AM EST  Workstation ID: [id number]        Last ECHO:  Results for orders placed in visit on 01/28/25    Intra-Op Anesthesia EMRE  1/28/2025    Echocardiogram Comments:  Moderately impaired lv function  EF estimated at 38%  [place] of LV is akinetic/dyskinetic  Nl valvular function  Images recorded of RADHA        PROBLEM LIST:     CAD s/p CABG x 5 1/28/2025    Hypertension    Hyperlipidemia    Type 2 diabetes mellitus        Initial cardiac assessment: 79-year-old female found to have multivessel CAD at cath in October [facility] went to Dr. Del Castillo for second opinion, status post 5V CABG 1/28/2025, postop EF 38%.    ASSESSMENT/PLAN:  1.  CAD:  Status post 5V CABG  Continue aspirin and statin  Continue metoprolol low-dose  Doing well off midodrine now    On room air, appears euvolemic, hold off on IV diuresis today    2.  Acute on chronic systolic heart failure, EF 40-45%:  Fairly euvolemic currently, EF on EMRE immediately postop 38%  Echo 1/31/25 EF 40-45%, with normal valves  Started Jardiance 10 mg daily 2/1/2025, tolerating well  Continue beta-blocker      3.  Acute on CKD 3B:  Maintain adequate perfusing pressure  Renal function remains normal, check daily BMP      4.  Hyperlipidemia:  Goal LDL less than 70, continue atorvastatin 40 mg daily    Discussed with patient      Sravan Arboleda MD  2/2/2025    12:48 EST

## 2025-02-02 NOTE — PLAN OF CARE
Goal Outcome Evaluation:      Aox4, NSR w/frequent PVCs/PACs, 110mL out via chest tube overnight. Ambulated 220 ft overnight. RA while awake, 2LNC while asleep.

## 2025-02-02 NOTE — PROGRESS NOTES
Intensive Care Follow-up     Hospital:  LOS: 5 days   Ms. Abena Washington, 79 y.o. female is followed for:   Coronary artery disease involving native coronary artery of native heart            History of present illness:   79-year-old female with diabetes, hypertension, coronary disease, dyslipidemia recently seen by cardiology team with complaints of intermittent/exertional chest pressure.  Echo showed EF of 45 to 50%.  Underwent stress testing which was abnormal and underwent subsequently left heart cath which showed multivessel coronary disease.  Patient was referred to CT surgery for surgical revascularization.  Patient underwent preop workup which included bilateral carotid duplex which did not show any significant carotid disease.  Spirometry was done but results are not available.  Patient was deemed surgical candidate so underwent coronary bypass graft surgery x 5 by Dr. Del Castillo on 1/28.  Estimated blood loss was 500 mL.  Cross-clamp time of 118 minutes and bypass time of 132 minutes.  Post procedure patient is transferred to ICU.  No other intraoperative complications.  Patient did not require any blood transfusion intraoperatively.       Subjective   Interval History:  Patient doing well this morning.  Hemoglobin did drop from 8.4-7.4.  But there is no signs of bleeding.             The patient's past medical, surgical and social history were reviewed and updated in Epic as appropriate.       Objective     Infusions:  norepinephrine, 0.02-0.3 mcg/kg/min, Last Rate: Stopped (01/31/25 0630)      Medications:  acetaminophen, 1,000 mg, Oral, Q8H  aspirin, 325 mg, Oral, Daily  atorvastatin, 40 mg, Oral, Nightly  empagliflozin, 10 mg, Oral, Daily  gabapentin, 100 mg, Oral, TID  insulin glargine, 10 Units, Subcutaneous, Daily  insulin lispro, 2-7 Units, Subcutaneous, 4x Daily AC & at Bedtime  metoprolol tartrate, 12.5 mg, Oral, Q12H  pantoprazole, 40 mg, Oral, Q AM  pharmacy consult - [facility], , Not Applicable,  Daily  Scopolamine, 1 patch, Transdermal, Q72H  senna-docusate sodium, 2 tablet, Oral, BID  sodium chloride, 10 mL, Intravenous, Q12H      I reviewed the patient's medications.    Vital Sign Min/Max for last 24 hours  Temp  Min: 97.4 °F (36.3 °C)  Max: 99 °F (37.2 °C)   BP  Min: 96/44  Max: 141/92   Pulse  Min: 53  Max: 71   Resp  Min: 16  Max: 18   SpO2  Min: 89 %  Max: 96 %   Flow (L/min) (Oxygen Therapy)  Min: 2  Max: 2       Input/Output for last 24 hour shift  02/01 0701 - 02/02 0700  In: 360 [P.O.:360]  Out: 1590 [Urine:1400]      GENERAL : NAD, conversant  RESPIRATORY/THORAX : normal respiratory effort and no intercostal retractions, CTAB  CARDIOVASCULAR : Normal S1/S2, RRR. no lower ext edema.  GASTROINTESTINAL : Soft, NT/ND. BS x 4 normoactive. No hepatosplenomegaly.  MUSCULOSKELETAL : No cyanosis, clubbing, or ischemia  NEUROLOGICAL: alert and oriented to person, place and time  PSYCHOLOGICAL : Appropriate affect    Results from last 7 days   Lab Units 02/02/25  0351 02/01/25  0305 01/31/25  0600   WBC 10*3/mm3 6.59 8.12 11.12*   HEMOGLOBIN g/dL 7.4* 8.4* 7.6*   PLATELETS 10*3/mm3 171 164 134*     Results from last 7 days   Lab Units 02/02/25  0351 02/01/25  1310 02/01/25  0305 01/31/25  1354 01/31/25  0600 01/30/25  0334 01/29/25  0314 01/28/25  1755   SODIUM mmol/L 138  --  136  --  139   < > 145 146*   POTASSIUM mmol/L 4.5 5.1 3.4*   < > 3.9   < > 4.0 4.3   CO2 mmol/L 26.0  --  22.0  --  24.0   < > 26.0 28.0   BUN mg/dL 31*  --  29*  --  22   < > 20 18   CREATININE mg/dL 1.13*  --  1.03*  --  1.01*   < > 1.32* 1.18*   MAGNESIUM mg/dL  --   --  2.2  --  2.2  --  2.6* 2.7*   PHOSPHORUS mg/dL  --   --  2.5  --  2.3*  --   --  4.1   GLUCOSE mg/dL 173*  --  286*  --  149*   < > 191* 197*    < > = values in this interval not displayed.     Estimated Creatinine Clearance: 36 mL/min (A) (by C-G formula based on SCr of 1.13 mg/dL (H)).    Results from last 7 days   Lab Units 01/29/25  0652   PH, ARTERIAL pH  units 7.334*   PCO2, ARTERIAL mm Hg 47.4*   PO2 ART mm Hg 129.0*       I reviewed the patient's new clinical results.  I reviewed the patient's new imaging results/reports including actual images and agree with reports.       Imaging Results (Last 24 Hours)       Procedure Component Value Units Date/Time    XR Chest 1 View [[id number]] Collected: 02/02/25 0750     Updated: 02/02/25 0755    Narrative:      XR CHEST 1 VW    Date of Exam: 2/2/2025 5:56 AM EST    Indication: postop    Comparison: Chest radiograph 2/1/2025    Findings:  Stable cardiomediastinal silhouette. Median sternotomy and CABG. Bilateral thoracotomy tubes. There is a small left subpulmonic pneumothorax not clearly seen on prior. No visualized apical pneumothorax. Streaky perihilar and bibasilar opacities   appearance favors atelectasis grossly similar to prior exam. No overt edema or significant effusion. Blunting of the right costophrenic angle, question trace pleural fluid. Degenerative elated osseous changes.      Impression:      Impression:  Small left subpulmonic pneumothorax. Stable thoracotomy tubes.    Stable cardiomegaly and probable bibasilar predominant atelectasis.      Electronically Signed: Ryan Barragan MD    2/2/2025 7:52 AM EST    Workstation ID: [id number]            Assessment & Plan   Impression        CAD s/p CABG x 5 1/28/2025    Hypertension    Hyperlipidemia    Type 2 diabetes mellitus       Plan        79-year-old female with history of diabetes, hypertension, coronary disease, hyperlipidemia.  Who presented to the [facility] ICU status post CABG.     -ICU to follow  -Monitor hemoglobin dropped, but there is no signs of active bleeding  -Radiology mentions a pneumothorax on chest x-ray, I do not clearly see the pneumothorax and there is also no signs of any air leak.  -Continue subcu insulin, goal blood glucose less than 180  -Aspirin/statin/beta-blocker  -Duo nebs as needed  -PT/OT  -DVT prophylaxis   -A.m.  labs     I conducted multidisciplinary rounds and the plan of care was discussed with the multidisciplinary team at that time. In attendance at multidisciplinary rounds was clinical pharmacist, dietitian, nursing staff, and case management.    I discussed the patient's findings and my recommendations with patient and nursing staff       Lynne Peng, DO  Pulmonary, Critical care and Sleep Medicine

## 2025-02-02 NOTE — PROGRESS NOTES
CTS Progress Note    POD # 5 s/p CABG x 5     LOS: 5 days   Subjective  Sitting in bedside chair.  Comfortable and cooperative    Objective    Vital Signs  Temp:  [97.4 °F (36.3 °C)-99 °F (37.2 °C)] 98.2 °F (36.8 °C)  Heart Rate:  [53-71] 56  Resp:  [16-18] 18  BP: ()/(44-92) 105/50    Physical Exam:   General Appearance: alert, appears stated age and cooperative.   Lungs: Decreased lung sounds bilateral bases   Heart: regular rhythm & normal rate, normal S1, S2 and no murmur, no gallop, no rub   Chest: sternum stable   Skin: Incision c/d/I   CT:      190 cc      Results     Results from last 7 days   Lab Units 02/02/25  0351   WBC 10*3/mm3 6.59   HEMOGLOBIN g/dL 7.4*   HEMATOCRIT % 23.6*   PLATELETS 10*3/mm3 171     Results from last 7 days   Lab Units 02/02/25  0351   SODIUM mmol/L 138   POTASSIUM mmol/L 4.5   CHLORIDE mmol/L 104   CO2 mmol/L 26.0   BUN mg/dL 31*   CREATININE mg/dL 1.13*   GLUCOSE mg/dL 173*   CALCIUM mg/dL 8.3*       Imaging Results (Last 24 Hours)       Procedure Component Value Units Date/Time    XR Chest 1 View [[id number]] Collected: 02/02/25 0750     Updated: 02/02/25 0755    Narrative:      XR CHEST 1 VW    Date of Exam: 2/2/2025 5:56 AM EST    Indication: postop    Comparison: Chest radiograph 2/1/2025    Findings:  Stable cardiomediastinal silhouette. Median sternotomy and CABG. Bilateral thoracotomy tubes. There is a small left subpulmonic pneumothorax not clearly seen on prior. No visualized apical pneumothorax. Streaky perihilar and bibasilar opacities   appearance favors atelectasis grossly similar to prior exam. No overt edema or significant effusion. Blunting of the right costophrenic angle, question trace pleural fluid. Degenerative elated osseous changes.      Impression:      Impression:  Small left subpulmonic pneumothorax. Stable thoracotomy tubes.    Stable cardiomegaly and probable bibasilar predominant atelectasis.      Electronically Signed: Ryan Barragan MD     2/2/2025 7:52 AM EST    Workstation ID: [id number]            Assessment  POD # 5 s/p CABG x 5    CAD s/p CABG x 5 1/28/2025    Hypertension    Hyperlipidemia    Type 2 diabetes mellitus  Bibasilar atelectasis  Just off levo this AM    Plan   Wean oxygen as tolerated  Monitor H&H  Monitor renal function.   Ambulate TID  Pulmonary toilet    Agus Ramírez MD  02/02/25  09:48 EST

## 2025-02-03 ENCOUNTER — APPOINTMENT (OUTPATIENT)
Dept: GENERAL RADIOLOGY | Facility: HOSPITAL | Age: 80
DRG: 235 | End: 2025-02-03
Payer: MEDICARE

## 2025-02-03 LAB
ANION GAP SERPL CALCULATED.3IONS-SCNC: 7 MMOL/L (ref 5–15)
BUN SERPL-MCNC: 30 MG/DL (ref 8–23)
BUN/CREAT SERPL: 29.4 (ref 7–25)
CALCIUM SPEC-SCNC: 8.1 MG/DL (ref 8.6–10.5)
CHLORIDE SERPL-SCNC: 104 MMOL/L (ref 98–107)
CO2 SERPL-SCNC: 27 MMOL/L (ref 22–29)
CREAT SERPL-MCNC: 1.02 MG/DL (ref 0.57–1)
DEPRECATED RDW RBC AUTO: 49.4 FL (ref 37–54)
EGFRCR SERPLBLD CKD-EPI 2021: 56.8 ML/MIN/1.73
ERYTHROCYTE [DISTWIDTH] IN BLOOD BY AUTOMATED COUNT: 14.6 % (ref 12.3–15.4)
GLUCOSE BLDC GLUCOMTR-MCNC: 178 MG/DL (ref 70–130)
GLUCOSE BLDC GLUCOMTR-MCNC: 184 MG/DL (ref 70–130)
GLUCOSE BLDC GLUCOMTR-MCNC: 217 MG/DL (ref 70–130)
GLUCOSE BLDC GLUCOMTR-MCNC: 254 MG/DL (ref 70–130)
GLUCOSE SERPL-MCNC: 190 MG/DL (ref 65–99)
HCT VFR BLD AUTO: 24.6 % (ref 34–46.6)
HGB BLD-MCNC: 7.8 G/DL (ref 12–15.9)
MCH RBC QN AUTO: 29.7 PG (ref 26.6–33)
MCHC RBC AUTO-ENTMCNC: 31.7 G/DL (ref 31.5–35.7)
MCV RBC AUTO: 93.5 FL (ref 79–97)
PLATELET # BLD AUTO: 219 10*3/MM3 (ref 140–450)
PMV BLD AUTO: 11.5 FL (ref 6–12)
POTASSIUM SERPL-SCNC: 4.9 MMOL/L (ref 3.5–5.2)
RBC # BLD AUTO: 2.63 10*6/MM3 (ref 3.77–5.28)
SODIUM SERPL-SCNC: 138 MMOL/L (ref 136–145)
WBC NRBC COR # BLD AUTO: 6.03 10*3/MM3 (ref 3.4–10.8)

## 2025-02-03 PROCEDURE — 97530 THERAPEUTIC ACTIVITIES: CPT

## 2025-02-03 PROCEDURE — 99232 SBSQ HOSP IP/OBS MODERATE 35: CPT | Performed by: INTERNAL MEDICINE

## 2025-02-03 PROCEDURE — 71045 X-RAY EXAM CHEST 1 VIEW: CPT

## 2025-02-03 PROCEDURE — 63710000001 INSULIN GLARGINE PER 5 UNITS: Performed by: INTERNAL MEDICINE

## 2025-02-03 PROCEDURE — 80048 BASIC METABOLIC PNL TOTAL CA: CPT | Performed by: THORACIC SURGERY (CARDIOTHORACIC VASCULAR SURGERY)

## 2025-02-03 PROCEDURE — 82948 REAGENT STRIP/BLOOD GLUCOSE: CPT

## 2025-02-03 PROCEDURE — 63710000001 INSULIN LISPRO (HUMAN) PER 5 UNITS: Performed by: PHYSICIAN ASSISTANT

## 2025-02-03 PROCEDURE — 25010000002 BUMETANIDE PER 0.5 MG: Performed by: THORACIC SURGERY (CARDIOTHORACIC VASCULAR SURGERY)

## 2025-02-03 PROCEDURE — 85027 COMPLETE CBC AUTOMATED: CPT | Performed by: THORACIC SURGERY (CARDIOTHORACIC VASCULAR SURGERY)

## 2025-02-03 PROCEDURE — 63710000001 INSULIN LISPRO (HUMAN) PER 5 UNITS: Performed by: INTERNAL MEDICINE

## 2025-02-03 RX ORDER — NICOTINE POLACRILEX 4 MG
15 LOZENGE BUCCAL
Status: DISCONTINUED | OUTPATIENT
Start: 2025-02-03 | End: 2025-02-05 | Stop reason: HOSPADM

## 2025-02-03 RX ORDER — INSULIN LISPRO 100 [IU]/ML
1-200 INJECTION, SOLUTION INTRAVENOUS; SUBCUTANEOUS
Status: DISCONTINUED | OUTPATIENT
Start: 2025-02-03 | End: 2025-02-05 | Stop reason: HOSPADM

## 2025-02-03 RX ORDER — INSULIN LISPRO 100 [IU]/ML
1-200 INJECTION, SOLUTION INTRAVENOUS; SUBCUTANEOUS AS NEEDED
Status: DISCONTINUED | OUTPATIENT
Start: 2025-02-03 | End: 2025-02-05 | Stop reason: HOSPADM

## 2025-02-03 RX ORDER — DEXTROSE MONOHYDRATE 25 G/50ML
10-50 INJECTION, SOLUTION INTRAVENOUS
Status: DISCONTINUED | OUTPATIENT
Start: 2025-02-03 | End: 2025-02-05 | Stop reason: HOSPADM

## 2025-02-03 RX ORDER — IBUPROFEN 600 MG/1
1 TABLET ORAL
Status: DISCONTINUED | OUTPATIENT
Start: 2025-02-03 | End: 2025-02-05 | Stop reason: HOSPADM

## 2025-02-03 RX ORDER — BUMETANIDE 0.25 MG/ML
2 INJECTION, SOLUTION INTRAMUSCULAR; INTRAVENOUS ONCE
Status: COMPLETED | OUTPATIENT
Start: 2025-02-03 | End: 2025-02-03

## 2025-02-03 RX ORDER — OXYCODONE HYDROCHLORIDE 5 MG/1
5 TABLET ORAL ONCE
Status: COMPLETED | OUTPATIENT
Start: 2025-02-03 | End: 2025-02-03

## 2025-02-03 RX ADMIN — ATORVASTATIN CALCIUM 40 MG: 40 TABLET, FILM COATED ORAL at 20:34

## 2025-02-03 RX ADMIN — ASPIRIN 325 MG: 325 TABLET ORAL at 08:46

## 2025-02-03 RX ADMIN — Medication 12.5 MG: at 20:34

## 2025-02-03 RX ADMIN — INSULIN LISPRO 1 UNITS: 100 INJECTION, SOLUTION INTRAVENOUS; SUBCUTANEOUS at 20:34

## 2025-02-03 RX ADMIN — INSULIN GLARGINE 10 UNITS: 100 INJECTION, SOLUTION SUBCUTANEOUS at 08:46

## 2025-02-03 RX ADMIN — INSULIN LISPRO 3 UNITS: 100 INJECTION, SOLUTION INTRAVENOUS; SUBCUTANEOUS at 17:57

## 2025-02-03 RX ADMIN — BUMETANIDE 2 MG: 0.25 INJECTION INTRAMUSCULAR; INTRAVENOUS at 07:29

## 2025-02-03 RX ADMIN — OXYCODONE HYDROCHLORIDE 5 MG: 5 TABLET ORAL at 08:36

## 2025-02-03 RX ADMIN — PANTOPRAZOLE SODIUM 40 MG: 40 TABLET, DELAYED RELEASE ORAL at 05:46

## 2025-02-03 RX ADMIN — SENNOSIDES AND DOCUSATE SODIUM 2 TABLET: 50; 8.6 TABLET ORAL at 08:47

## 2025-02-03 RX ADMIN — INSULIN LISPRO 2 UNITS: 100 INJECTION, SOLUTION INTRAVENOUS; SUBCUTANEOUS at 08:46

## 2025-02-03 RX ADMIN — SENNOSIDES AND DOCUSATE SODIUM 2 TABLET: 50; 8.6 TABLET ORAL at 20:35

## 2025-02-03 RX ADMIN — HYDROCODONE BITARTRATE AND ACETAMINOPHEN 1 TABLET: 7.5; 325 TABLET ORAL at 04:02

## 2025-02-03 RX ADMIN — GABAPENTIN 100 MG: 100 CAPSULE ORAL at 15:30

## 2025-02-03 RX ADMIN — INSULIN GLARGINE 4 UNITS: 100 INJECTION, SOLUTION SUBCUTANEOUS at 10:32

## 2025-02-03 RX ADMIN — INSULIN LISPRO 4 UNITS: 100 INJECTION, SOLUTION INTRAVENOUS; SUBCUTANEOUS at 12:50

## 2025-02-03 RX ADMIN — ACETAMINOPHEN 1000 MG: 500 TABLET, FILM COATED ORAL at 15:26

## 2025-02-03 RX ADMIN — GABAPENTIN 100 MG: 100 CAPSULE ORAL at 08:47

## 2025-02-03 RX ADMIN — Medication 12.5 MG: at 08:46

## 2025-02-03 RX ADMIN — GABAPENTIN 100 MG: 100 CAPSULE ORAL at 20:34

## 2025-02-03 RX ADMIN — EMPAGLIFLOZIN 10 MG: 10 TABLET, FILM COATED ORAL at 08:47

## 2025-02-03 NOTE — PLAN OF CARE
Goal Outcome Evaluation:            Aox4, NSR, RA while awake, 2LNC while asleep. 150 mL out overnight via chest tube. Norco prn given 2x overnight. Ambulated 220 ft w/o issue.

## 2025-02-03 NOTE — PLAN OF CARE
Goal Outcome Evaluation:  Plan of Care Reviewed With: patient        Progress: improving  Outcome Evaluation: Pt ambulated 220ft with CGA and B UE support on tripod monitor. VC's for upright posture with forward gaze. Pt demonstrated good balance and stability. Limited by fatigue. Continue to recommend PT skilled care and D/C home with assistance and  PT services.    Anticipated Discharge Disposition (PT): home with assist, home with home health

## 2025-02-03 NOTE — PROGRESS NOTES
"          Clinical Nutrition Assessment     Patient Name: Abena Washington  YOB: 1945  MRN: [id number]  Date of Encounter: 02/03/25 12:25 EST  Admission date: 1/28/2025  Reason for Visit: ODALIS LOS    Francisco   Nutrition Assessment   Admission Diagnosis:  Coronary artery disease involving native coronary artery of native heart, unspecified whether angina present [I25.10]  CAD (coronary artery disease) [I25.10]    Problem List:    CAD s/p CABG x 5 1/28/2025    Hypertension    Hyperlipidemia    Type 2 diabetes mellitus      PMH:   She  has a past medical history of Anemia, Anxiety and depression, Arthritis, Cataracts, bilateral, Coronary artery disease, Delayed emergence from anesthesia, Diabetes mellitus, Heart attack, Hyperlipidemia, Hypertension, Nausea, Peripheral neuropathy, PONV (postoperative nausea and vomiting), Skin melanoma, and Wears glasses.    PSH:  She  has a past surgical history that includes Cholecystectomy; Hysterectomy; Tubal ligation; Colonoscopy; Esophagogastroduodenoscopy; Cataract extraction w/ intraocular lens implant (Left, 06/21/2023); Cataract extraction w/ intraocular lens implant (Right, 07/07/2023); Cardiac catheterization; Skin cancer excision; and Coronary artery bypass graft (N/A, 1/28/2025).    Applicable Nutrition History:   (1/28) s/p CABG x5    Anthropometrics     Height: Height: 157.5 cm (62\") (Per Ruthie Dela Cruz RN charting on 1-27-25)  Last Filed Weight: Weight: 54.7 kg (120 lb 9.5 oz) (Per Ruthie Dela Cruz RN charting on 1-27-25) (01/29/25 0634)  Method:  ?  BMI: BMI (Calculated): 22.1    UBW:  120#  Weight change: No significant changes    Nutrition Focused Physical Exam    Date:  2/3       Pt does not meet criteria for malnutrition diagnosis, at this time.      Subjective   Reported/Observed/Food/Nutrition Related History:     Pt up in chair at time of visit, able to provide recent nutrition hx. Endorsed a good appetite however feeling distaste for repetitive " meals. LBM 2/1 - liquid. Uncontrolled DM - takes Jardiance at home. +diuresis. Has orders to tele. Denies N/V or dysphagia. NKFA    Current Nutrition Prescription   PO: Diet: Cardiac, Diabetic; Healthy Heart (2-3 Na+); Consistent Carbohydrate; Fluid Consistency: Thin (IDDSI 0)  Oral Nutrition Supplement: N/A  Intake:  95.8% x last 6 meals documented    Assessment & Plan   Nutrition Diagnosis   Date:  2/3            Updated:    Problem No nutrition diagnosis at this time    Etiology    Signs/Symptoms    Status:       Goal:   Maintain nutrition and Maintain intake      Nutrition Intervention      Follow treatment progress, Care plan reviewed, Advise alternate selection, Advised available snacks, Interview for preferences, Encourage intake    Encouraged PO intake at meals  Next 3 meal orders obtained  Order ONS as needed/desired    Will monitor on the periphery for any changes to PO intake.     Monitoring/Evaluation:   Per protocol, I&O, POC/GOC    Rebeca Woods MS,RD,LD  Time Spent: 25min

## 2025-02-03 NOTE — THERAPY TREATMENT NOTE
Patient Name: Abena Washington  : 1945    MRN: [id number]                              Today's Date: 2/3/2025       Admit Date: 2025    Visit Dx:     ICD-10-CM ICD-9-CM   1. Coronary artery disease involving native coronary artery of native heart with angina pectoris  I25.119 414.01     413.9   2. Coronary artery disease involving native coronary artery of native heart, unspecified whether angina present  I25.10 414.01     Patient Active Problem List   Diagnosis    CAD s/p CABG x 5 2025    Hypertension    Hyperlipidemia    Type 2 diabetes mellitus     Past Medical History:   Diagnosis Date    Anemia     Anxiety and depression     Arthritis     Cataracts, bilateral     Coronary artery disease     Delayed emergence from anesthesia     Diabetes mellitus     Heart attack     Hyperlipidemia     Hypertension     Nausea     Peripheral neuropathy     PONV (postoperative nausea and vomiting)     Skin melanoma     Wears glasses      Past Surgical History:   Procedure Laterality Date    CARDIAC CATHETERIZATION      CATARACT EXTRACTION W/ INTRAOCULAR LENS IMPLANT Left 2023    Procedure: CATARACT PHACO EXTRACTION WITH INTRAOCULAR LENS IMPLANT LEFT;  Surgeon: Mina Hendrickson MD;  Location: [facility] OR;  Service: Ophthalmology;  Laterality: Left;    CATARACT EXTRACTION W/ INTRAOCULAR LENS IMPLANT Right 2023    Procedure: CATARACT PHACO EXTRACTION WITH INTRAOCULAR LENS IMPLANT RIGHT;  Surgeon: Mina Hendrickson MD;  Location: [facility] OR;  Service: Ophthalmology;  Laterality: Right;    CHOLECYSTECTOMY      COLONOSCOPY      CORONARY ARTERY BYPASS GRAFT N/A 2025    Procedure: MEDIAN STERNOTOMY, CORONARY ARTERY BYPASS GRAFTING X 5, UTILIZING THE LEFT INTERNAL MAMMARY ARTERY, ENDOSCOPIC VEIN HARVESTING OF THE LEFT SAPHENOUS VEIN, TRANSESOPHAGEAL ECHOCARDIOGRAM WITH ANESTHESIA;  Surgeon: Zeferino Del Castillo MD;  Location: [facility] OR;  Service: Cardiothoracic;  Laterality: N/A;    ENDOSCOPY       HYSTERECTOMY      SKIN CANCER EXCISION      TUBAL ABDOMINAL LIGATION        General Information       Row Name 02/03/25 1443          Physical Therapy Time and Intention    Document Type therapy note (daily note)  -KG     Mode of Treatment physical therapy  -KG       Row Name 02/03/25 1443          General Information    Existing Precautions/Restrictions cardiac;fall;sternal  -KG       Row Name 02/03/25 1443          Cognition    Orientation Status (Cognition) oriented x 3  -KG       Row Name 02/03/25 1443          Safety Issues/Impairments Affecting Functional Mobility    Safety Issues Affecting Function (Mobility) awareness of need for assistance;insight into deficits/self-awareness;safety precaution awareness;safety precautions follow-through/compliance  -KG     Impairments Affecting Function (Mobility) balance;endurance/activity tolerance;postural/trunk control;strength  -KG               User Key  (r) = Recorded By, (t) = Taken By, (c) = Cosigned By      Initials Name Provider Type    KG Patrica Rodriguez, PT Physical Therapist                   Mobility       Row Name 02/03/25 1445          Bed Mobility    Bed Mobility sit-supine  -KG     Sit-Supine [place] (Bed Mobility) moderate assist (50% patient effort);verbal cues  -KG     Assistive Device (Bed Mobility) head of bed elevated  -KG     Comment, (Bed Mobility) VC's for sequencing and technique. Pt required assistance at trunk and BLEs.  -KG       Row Name 02/03/25 1445          Transfers    Comment, (Transfers) VC's for sequencing and safe hand placement to maintain sternal precautions. Toilet transfer to [facility] with CGA.  -KG       Row Name 02/03/25 1445          Sit-Stand Transfer    Sit-Stand [place] (Transfers) minimum assist (75% patient effort);verbal cues  -KG       Row Name 02/03/25 1445          Gait/Stairs (Locomotion)    [place] Level (Gait) contact guard;verbal cues  -KG     Assistive Device (Gait) other (see comments)  B UE  support on tripod monitor  -KG     Distance in Feet (Gait) 220  -KG     Deviations/Abnormal Patterns (Gait) base of support, narrow;jad decreased;stride length decreased  -KG     Bilateral Gait Deviations forward flexed posture;heel strike decreased  -KG     Comment, (Gait/Stairs) Pt demonstrated step through gait pattern with slow jad and decreased step length. VC's for upright posture. Pt demonstrated good balance and stability. Denied any c/o increased pain with mobility. Limited by fatigue.  -KG               User Key  (r) = Recorded By, (t) = Taken By, (c) = Cosigned By      Initials Name Provider Type    KG Patrica Rodriguez, PT Physical Therapist                   Obj/Interventions       Row Name 02/03/25 1447          Balance    Balance Assessment sitting static balance;standing static balance;standing dynamic balance  -KG     Static Sitting Balance standby assist  -KG     Position, Sitting Balance unsupported;sitting in chair  -KG     Static Standing Balance contact guard  -KG     Dynamic Standing Balance contact guard  -KG     Position/Device Used, Standing Balance supported  -KG               User Key  (r) = Recorded By, (t) = Taken By, (c) = Cosigned By      Initials Name Provider Type    KG Patrica Rodriguez, PT Physical Therapist                   Goals/Plan    No documentation.                  Clinical Impression       Row Name 02/03/25 1448          Pain    Pretreatment Pain Rating 0/10 - no pain  -KG     Posttreatment Pain Rating 0/10 - no pain  -KG       Row Name 02/03/25 1448          Plan of Care Review    Plan of Care Reviewed With patient  -KG     Progress improving  -KG     Outcome Evaluation Pt ambulated 220ft with CGA and B UE support on tripod monitor. VC's for upright posture with forward gaze. Pt demonstrated good balance and stability. Limited by fatigue. Continue to recommend PT skilled care and D/C home with assistance and  PT services.  -KG       Row Name 02/03/25  1448          Vital Signs    Pre Systolic BP Rehab 113  -KG     Pre Treatment Diastolic BP 54  -KG     Post Systolic BP Rehab 125  -KG     Post Treatment Diastolic BP 58  -KG     Pretreatment Heart Rate (beats/min) 67  -KG     Posttreatment Heart Rate (beats/min) 60  -KG     Pre SpO2 (%) 95  -KG     O2 Delivery Pre Treatment room air  -KG     Post SpO2 (%) 97  -KG     O2 Delivery Post Treatment room air  -KG     Pre Patient Position Sitting  -KG     Intra Patient Position Standing  -KG     Post Patient Position Supine  -KG       Row Name 02/03/25 1448          Positioning and Restraints    Pre-Treatment Position sitting in chair/recliner  -KG     Post Treatment Position bed  -KG     In Bed notified nsg;supine;call light within reach;encouraged to call for assist;side rails up x3;RUE elevated;LUE elevated  -KG               User Key  (r) = Recorded By, (t) = Taken By, (c) = Cosigned By      Initials Name Provider Type    Patrica Piña, PT Physical Therapist                   Outcome Measures       Row Name 02/03/25 1449          How much help from another person do you currently need...    Turning from your back to your side while in flat bed without using bedrails? 3  -KG     Moving from lying on back to sitting on the side of a flat bed without bedrails? 2  -KG     Moving to and from a bed to a chair (including a wheelchair)? 3  -KG     Standing up from a chair using your arms (e.g., wheelchair, bedside chair)? 3  -KG     Climbing 3-5 steps with a railing? 2  -KG     To walk in hospital room? 3  -KG     AM-PAC 6 Clicks Score (PT) 16  -KG     Highest Level of Mobility Goal 5 --> Static standing  -KG       Row Name 02/03/25 1449          Functional Assessment    Outcome Measure Options AM-PAC 6 Clicks Basic Mobility (PT)  -KG               User Key  (r) = Recorded By, (t) = Taken By, (c) = Cosigned By      Initials Name Provider Type    Patrica Piña, PT Physical Therapist                                  Physical Therapy Education       Title: PT OT SLP Therapies (In Progress)       Topic: Physical Therapy (Done)       Point: Mobility training (Done)       Learning Progress Summary            Patient Acceptance, E, VU,NR by KG at 2/3/2025 1009    Acceptance, E, NR by AE at 2/1/2025 0813    Acceptance, E, NR by KG at 1/31/2025 0901    Acceptance, E, NR by KG at 1/30/2025 1449                      Point: Home exercise program (Done)       Learning Progress Summary            Patient Acceptance, E, VU,NR by KG at 2/3/2025 1009    Acceptance, E, NR by AE at 2/1/2025 0813    Acceptance, E, NR by KG at 1/31/2025 0901                      Point: Body mechanics (Done)       Learning Progress Summary            Patient Acceptance, E, VU,NR by KG at 2/3/2025 1009    Acceptance, E, NR by AE at 2/1/2025 0813    Acceptance, E, NR by KG at 1/31/2025 0901    Acceptance, E, NR by KG at 1/30/2025 1449                      Point: Precautions (Done)       Learning Progress Summary            Patient Acceptance, E, VU,NR by KG at 2/3/2025 1009    Acceptance, E, NR by AE at 2/1/2025 0813    Acceptance, E, NR by KG at 1/31/2025 0901    Acceptance, E, NR by KG at 1/30/2025 1449                                      User Key       Initials Effective Dates Name Provider Type Discipline    KG 05/22/20 -  Patrica Rodriguez, PT Physical Therapist PT    AE 09/21/21 -  Bryce Castillo PT Physical Therapist PT                  PT Recommendation and Plan  Planned Therapy Interventions (PT): balance training, bed mobility training, gait training, strengthening, transfer training  Progress: improving  Outcome Evaluation: Pt ambulated 220ft with CGA and B UE support on tripod monitor. VC's for upright posture with forward gaze. Pt demonstrated good balance and stability. Limited by fatigue. Continue to recommend PT skilled care and D/C home with assistance and  PT services.     Time Calculation:   PT Evaluation Complexity  History,  PT Evaluation Complexity: 3 or more personal factors and/or comorbidities  Examination of Body Systems (PT Eval Complexity): total of 3 or more elements  Clinical Presentation (PT Evaluation Complexity): evolving  Clinical Decision Making (PT Evaluation Complexity): moderate complexity  Overall Complexity (PT Evaluation Complexity): moderate complexity     PT Charges       Row Name 02/03/25 1009             Time Calculation    Start Time 1009  -KG      PT Received On 02/03/25  -KG      PT Goal Re-Cert Due Date 02/09/25  -KG         Time Calculation- PT    Total Timed Code Minutes- PT 24 minute(s)  -KG         Timed Charges    88089 - PT Therapeutic Activity Minutes 24  -KG         Total Minutes    Timed Charges Total Minutes 24  -KG       Total Minutes 24  -KG                User Key  (r) = Recorded By, (t) = Taken By, (c) = Cosigned By      Initials Name Provider Type    KG Patrica Rodriguez, PT Physical Therapist                  Therapy Charges for Today       Code Description Service Date Service Provider Modifiers Qty    92702559374 HC PT THERAPEUTIC ACT EA 15 MIN 2/3/2025 Patrica Rodriguez, PT GP 2            PT G-Codes  Outcome Measure Options: AM-PAC 6 Clicks Basic Mobility (PT)  AM-PAC 6 Clicks Score (PT): 16  AM-PAC 6 Clicks Score (OT): 15  PT Discharge Summary  Anticipated Discharge Disposition (PT): home with assist, home with home health    Erica Rodriguez, PT  2/3/2025

## 2025-02-03 NOTE — PROGRESS NOTES
CTS Progress Note    POD # 6 s/p CABG x 5     LOS: 6 days     Subjective  No acute events overnight. On room air. Ambulated 220 ft.    Objective    Vital Signs  Temp:  [97.7 °F (36.5 °C)-98.6 °F (37 °C)] 98.3 °F (36.8 °C)  Heart Rate:  [50-73] 57  Resp:  [16-20] 18  BP: ()/(45-78) 122/55    Physical Exam:   General Appearance: alert, appears stated age and cooperative   Lungs: clear to auscultation     Heart: regular rhythm & normal rate, normal S1, S2 and no murmur, no gallop, no rub   Chest: sternum stable   Skin: Incision c/d/i     Results     Results from last 7 days   Lab Units 02/03/25  0402   WBC 10*3/mm3 6.03   HEMOGLOBIN g/dL 7.8*   HEMATOCRIT % 24.6*   PLATELETS 10*3/mm3 219     Results from last 7 days   Lab Units 02/03/25  0402   SODIUM mmol/L 138   POTASSIUM mmol/L 4.9   CHLORIDE mmol/L 104   CO2 mmol/L 27.0   BUN mg/dL 30*   CREATININE mg/dL 1.02*   GLUCOSE mg/dL 190*   CALCIUM mg/dL 8.1*       Imaging Results (Last 24 Hours)       Procedure Component Value Units Date/Time    XR Chest 1 View [[id number]] Collected: 02/03/25 0710     Updated: 02/03/25 0716    Narrative:      XR CHEST 1 VW    Date of Exam: 2/3/2025 3:34 AM EST    Indication: Postop day 6 status post CABG    Comparison: Portable chest dated 2/2/2024 and 2/1/2024    Findings:  Bilateral chest tubes remain in place. There is a very small left apical pneumothorax. No definitive right-sided pneumothorax. Cardiac, hilar, and mediastinal silhouettes are unchanged with evidence of previous CABG and mild cardiomegaly. Pulmonary   vascularity is normal. There is improved aeration with residual linear atelectasis in the right lower lung. Lungs otherwise are grossly clear. No definite pleural effusions. No acute bony abnormalities are demonstrated. The trachea is midline.      Impression:      Impression:  1.Bilateral chest tubes remain in place. There is a very small left apical pneumothorax. No definite right-sided pneumothorax.  2.Improved  aeration with residual linear atelectasis in the right lower lung.        Electronically Signed: Seanpalma Tellez DO    2/3/2025 7:13 AM EST    Workstation ID: [id number]    XR Chest 1 View [[id number]] Collected: 02/02/25 0750     Updated: 02/02/25 0755    Narrative:      XR CHEST 1 VW    Date of Exam: 2/2/2025 5:56 AM EST    Indication: postop    Comparison: Chest radiograph 2/1/2025    Findings:  Stable cardiomediastinal silhouette. Median sternotomy and CABG. Bilateral thoracotomy tubes. There is a small left subpulmonic pneumothorax not clearly seen on prior. No visualized apical pneumothorax. Streaky perihilar and bibasilar opacities   appearance favors atelectasis grossly similar to prior exam. No overt edema or significant effusion. Blunting of the right costophrenic angle, question trace pleural fluid. Degenerative elated osseous changes.      Impression:      Impression:  Small left subpulmonic pneumothorax. Stable thoracotomy tubes.    Stable cardiomegaly and probable bibasilar predominant atelectasis.      Electronically Signed: Ryan Barragan MD    2/2/2025 7:52 AM EST    Workstation ID: [id number]            Assessment  POD # 6 s/p CABG x 5    CAD s/p CABG x 5 1/28/2025    Hypertension    Hyperlipidemia    Type 2 diabetes mellitus    Plan   D/C chest tubes  D/C central line  Bumex 2 mg IV x1  Monitor H&H  Ambulate TID  Pulmonary toilet  Transfer to telemetry    Zeferino Del Castillo MD  02/03/25  07:38 EST

## 2025-02-03 NOTE — CASE MANAGEMENT/SOCIAL WORK
Continued Stay Note  [facility]     Patient Name: Abena Washington  MRN: [id number]  Today's Date: 2/3/2025    Admit Date: 1/28/2025    Plan: Home    Discharge Plan       Row Name 02/03/25 1223       Plan    Plan Home     Patient/Family in Agreement with Plan yes    Plan Comments Spoke with pt at bedside and made aware that [facility] is able to accept her at discharge. Attempt made to discuss IMM. Pt requested that CM discuss with her daughter. Daughter called at later time and stated she was at pt bedside at that time and requested discussion in person. CM at bedside within 10 min with daughtet no longer at bedside, pt states she went to get food. CM will cont to follow.    Final Discharge Disposition Code 06 - home with home health care                   Discharge Codes    No documentation.                       Anna Muñoz RN

## 2025-02-03 NOTE — PROGRESS NOTES
INTENSIVIST   PROGRESS NOTE        SUBJECTIVE     Abena 79 y.o. female is followed for: No chief complaint on file.       CAD s/p CABG x 5 2025    Hypertension    Hyperlipidemia    Type 2 diabetes mellitus    As an Intensivist, we provide an integrated approach to the ICU patient and family, medical management of comorbid conditions, including but not limited to electrolytes, glycemic control, organ dysfunction, lead interdisciplinary rounds and coordinate the care with all other services, including those from other specialists.     Interval History:  POD: 6 Days Post-Op Procedure(s) (LRB):  MEDIAN STERNOTOMY, CORONARY ARTERY BYPASS GRAFTING X 5, UTILIZING THE LEFT INTERNAL MAMMARY ARTERY, ENDOSCOPIC VEIN HARVESTING OF THE LEFT SAPHENOUS VEIN, TRANSESOPHAGEAL ECHOCARDIOGRAM WITH ANESTHESIA (N/A)     Doing well.    Chest tubes just removed this morning.    On ambient air.    Last Bowel Movement: 25 (25)    Temp  Min: 97.7 °F (36.5 °C)  Max: 98.3 °F (36.8 °C)       History     Last Reviewed by Akash Land MD on 2/3/2025 at 12:44 PM    Sections Reviewed    Medical, Surgical, Family, Tobacco, Alcohol, Drug Use, Sexual Activity,   Social Documentation    Problem list reviewed by Akash Land MD on 2/3/2025 at 12:44 PM  Medicines reviewed by Akash Land MD on 2/3/2025 at 12:44 PM  Allergies reviewed by Akash Land MD on 2/3/2025 at 12:44 PM       The patient's relevant past medical, surgical and social history were reviewed and updated in Epic as appropriate.        OBJECTIVE     Physical Examination    Vitals:  Temp: 97.8 °F (36.6 °C) (25 0846) Temp  Min: 97.7 °F (36.5 °C)  Max: 98.3 °F (36.8 °C)   Temp core:      BP: 125/56 (25 1100) BP  Min: 99/45  Max: 137/75   MAP (non-invasive) Noninvasive MAP (mmHg): 85 (25 1100) Noninvasive MAP (mmHg)  Av.6  Min: 54  Max: 107   Pulse: 58 (25 1100) Pulse  Min: 50  Max: 73   Resp: 16 (25 1000) Resp  Min: 16   Max: 20   SpO2: 94 % (02/03/25 1100) SpO2  Min: 85 %  Max: 98 %   Device: room air (02/03/25 1000)    Flow Rate: 2 (02/03/25 0400) Flow (L/min) (Oxygen Therapy)  Min: 2  Max: 2     Intake/Ouptut 24 hrs (7:00AM - 6:59 AM)  Intake & Output (last 2 days)         02/01 0701 02/02 0700 02/02 0701 02/03 0700 02/03 0701 02/04 0700    P.O. 360 720 240    I.V. (mL/kg)       Total Intake(mL/kg) 360 (6.6) 720 (13.2) 240 (4.4)    Urine (mL/kg/hr) 1400 (1.1) 1700 (1.3) 1675 (5.3)    Stool 0      Chest Tube 190 300 30    Total Output 1590 2000 1705    Net -1230 -1280 -1465           Urine Unmeasured Occurrence 1 x      Stool Unmeasured Occurrence 1 x              Medications (drips):             01/29/25  0634   Weight: 54.7 kg (120 lb 9.5 oz) (Per Ruthie Dela Cruz RN charting on 1-27-25)      Telemetry:  Rhythm: normal sinus rhythm (02/03/25 1000)     QTc Interval (Sec): 320 (02/03/25 0800)   Constitutional:  No acute distress.   Cardiovascular: RRR.    Respiratory: Normal breath sounds  No adventitious sounds   Abdominal:  Soft with no tenderness.   Extremities: Trace Edema   Neurological:   Alert, Oriented, Cooperative.  Best Eye Response: 4-->(E4) spontaneous (02/03/25 1000)  Best Motor Response: 6-->(M6) obeys commands (02/03/25 1000)  Best Verbal Response: 5-->(V5) oriented (02/03/25 1000)  Rhodes Coma Scale Score: 15 (02/03/25 1000)     Results Reviewed:  Laboratory  Microbiology  Radiology  Pathology    Hematology:  Results from last 7 days   Lab Units 02/03/25  0402 02/02/25  0351 02/01/25  0305   WBC 10*3/mm3 6.03 6.59 8.12   HEMOGLOBIN g/dL 7.8* 7.4* 8.4*   MCV fL 93.5 93.3 94.3   PLATELETS 10*3/mm3 219 171 164     Results from last 7 days   Lab Units 02/01/25  0305 01/31/25  0600 01/29/25  0314   NEUTROS ABS 10*3/mm3 6.18 8.79* 9.72*   LYMPHS ABS 10*3/mm3 1.24 1.47 0.79   EOS ABS 10*3/mm3 0.08 0.04 0.01     Chemistry:  Estimated Creatinine Clearance: 39.9 mL/min (A) (by C-G formula based on SCr of 1.02 mg/dL  (H)).    Results from last 7 days   Lab Units 02/03/25  0402 02/02/25  0351   SODIUM mmol/L 138 138   POTASSIUM mmol/L 4.9 4.5   CHLORIDE mmol/L 104 104   CO2 mmol/L 27.0 26.0   BUN mg/dL 30* 31*   CREATININE mg/dL 1.02* 1.13*   GLUCOSE mg/dL 190* 173*     Results from last 7 days   Lab Units 02/03/25  0402 02/02/25  0351 02/01/25  0305 01/31/25  0600 01/28/25  1755 01/28/25  1321   IONIZED CALCIUM mmol/L  --   --   --   --   --  1.18   CALCIUM mg/dL 8.1* 8.3* 7.9* 8.0*   < > 9.9   MAGNESIUM mg/dL  --   --  2.2 2.2   < > 3.1*   PHOSPHORUS mg/dL  --   --  2.5 2.3*   < > 3.2    < > = values in this interval not displayed.     Images:  XR Chest 1 View    Result Date: 2/3/2025  Impression: 1.Bilateral chest tubes remain in place. There is a very small left apical pneumothorax. No definite right-sided pneumothorax. 2.Improved aeration with residual linear atelectasis in the right lower lung. Electronically Signed: Sean Tellez DO  2/3/2025 7:13 AM EST  Workstation ID: [id number]    XR Chest 1 View    Result Date: 2/2/2025  Impression: Small left subpulmonic pneumothorax. Stable thoracotomy tubes. Stable cardiomegaly and probable bibasilar predominant atelectasis. Electronically Signed: Ryan Barragan MD  2/2/2025 7:52 AM EST  Workstation ID: [id number]     Echo:  Results for orders placed during the hospital encounter of 01/28/25    Adult Transthoracic Echo Complete W/ Cont if Necessary Per Protocol    Interpretation Summary    Left ventricular systolic function is mildly decreased. Calculated left ventricular EF = 43.7% Left ventricular ejection fraction appears to be 41 - 45%.    Left ventricular diastolic function was indeterminate.    There is calcification of the aortic valve.    Estimated right ventricular systolic pressure from tricuspid regurgitation is normal (<35 mmHg).      Results: Reviewed.  I reviewed the patient's new laboratory and imaging results.  I independently reviewed the patient's new  images.    Medications: Reviewed.    Assessment   A/P   Assessment/Management/Treatment Plan:  Hospital:  LOS: 6 days   ICU: 5d 23h     Active Hospital Problems    Diagnosis  POA    **CAD s/p CABG x 5 1/28/2025 [I25.10]  Yes    Hyperlipidemia [E78.5]  Yes    Hypertension [I10]  Yes    Type 2 diabetes mellitus [E11.9]  Yes     Abena is a 79 y.o. female admitted on 1/28/2025 with Coronary artery disease involving native coronary artery of native heart, unspecified whether angina present [I25.10]  CAD (coronary artery disease) [I25.10]    She was  recently seen by cardiology team with complaints of intermittent/exertional chest pressure.  Echo showed EF of 45 to 50%.  Underwent stress testing which was abnormal and underwent subsequently left heart cath which showed multivessel coronary disease.  Bilateral carotid duplex did not show any significant carotid disease.  She underwent coronary bypass graft surgery x 5 by Dr. Del Castillo on 1/28.      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   HTN  Dyslipidemia   Hematology  NC Anemia  CKD 3B.  Endocrine   Body mass index is 22.06 kg/m². Normal: 18.5-24.9kg/m2  Type 2 diabetes.    Lab Results   Lab Value Date/Time    HGBA1C 8.30 (H) 01/27/2025 1310     Results from last 7 days   Lab Units 02/03/25  1132 02/03/25  0734 02/02/25  1951 02/02/25  1634 02/02/25  1125 02/02/25  0708 02/01/25  1954 02/01/25  1702   GLUCOSE mg/dL 254* 178* 261* 274* 215* 167* 357* 254*       Diet: Diet: Cardiac, Diabetic; Healthy Heart (2-3 Na+); Consistent Carbohydrate; Fluid Consistency: Thin (IDDSI 0)   Advance Directives: Code Status and Medical Interventions: CPR (Attempt to Resuscitate); Full Support   Ordered at: 01/28/25 1254     Code Status (Patient has no pulse and is not breathing):    CPR (Attempt to Resuscitate)     Medical Interventions (Patient has pulse or is breathing):     Full Support        VTE Prophylaxis:  Mechanical VTE prophylaxis orders are present.         In brief:  Goal: Glucose < 180 mg/dL.   Insulin: Basal, Prandial and Correction.  Disposition: Transfer to Telemetry Unit    Plan of care and goals reviewed during interdisciplinary rounds.  I discussed the patient's findings and my recommendations with patient and nursing staff    MDM:    Problem(s) High due to: Acute or Chronic illness or injury that may poses a threat to life or bodily function  Data: Moderate due to: Review of prior external records from each unique source, Review or results of each unique test, and Ordering of each unique test    Moderate    [] Primary Attending Intensive Care Medicine - Nutrition Support   [x] Consultant    Copied text in this note has been reviewed and is accurate as of 02/03/25

## 2025-02-03 NOTE — PROGRESS NOTES
[facility]  INPATIENT PROGRESS NOTE         [facility]    2/3/2025      PATIENT IDENTIFICATION:   Name:  Abena Washington      MRN:  [id number]     79 y.o.  female             Reason for visit: Chronic systolic heart failure, CAD, hypertension, hyperlipidemia      SUBJECTIVE:   No new complaints today, vitals are stable blood pressure remains in normal range, still shortness of breath with exertion but none at rest, chest tube still in place     OBJECTIVE:  Vitals:    02/03/25 0615 02/03/25 0630 02/03/25 0645 02/03/25 0846   BP:  122/55  120/58   BP Location:       Patient Position:       Pulse: 56 54 57 63   Resp:       Temp:       TempSrc:       SpO2: 96% 95% 95%    Weight:       Height:         FiO2 (%): 40 %     Body mass index is 22.06 kg/m².    Intake/Output Summary (Last 24 hours) at 2/3/2025 0849  Last data filed at 2/3/2025 0800  Gross per 24 hour   Intake 720 ml   Output 2395 ml   Net -1675 ml       Telemetry: Personally reviewed, normal sinus rhythm, no arrhythmia     Exam::   General Appearance:   · well developed  · well nourished  Neck:  · thyroid not enlarged  · supple  Respiratory:  · no respiratory distress  · normal breath sounds  · no rales  Cardiovascular:  · no jugular venous distention  · regular rhythm  · apical impulse normal  · S1 normal, S2 normal  · no S3, no S4   · no murmur  · no rub, no thrill  · carotid pulses normal; no bruit  · pedal pulses normal  · lower extremity edema: 1+  Skin:   warm, dry left chest tube in place        No Known Allergies  Scheduled meds:       acetaminophen, 1,000 mg, Oral, Q8H  aspirin, 325 mg, Oral, Daily  atorvastatin, 40 mg, Oral, Nightly  empagliflozin, 10 mg, Oral, Daily  gabapentin, 100 mg, Oral, TID  insulin glargine, 10 Units, Subcutaneous, Daily  insulin lispro, 2-7 Units, Subcutaneous, 4x Daily AC & at Bedtime  metoprolol tartrate, 12.5 mg, Oral, Q12H  pantoprazole, 40 mg, Oral, Q AM  pharmacy  "consult - MTM, , Not Applicable, Daily  Scopolamine, 1 patch, Transdermal, Q72H  senna-docusate sodium, 2 tablet, Oral, BID      IV meds:                      norepinephrine, 0.02-0.3 mcg/kg/min, Last Rate: Stopped (01/31/25 0630)      Data Review:  Results from last 7 days   Lab Units 02/03/25  0402 02/02/25  0351 02/01/25  0305 01/31/25  0600   SODIUM mmol/L 138 138 136 139   BUN mg/dL 30* 31* 29* 22   CREATININE mg/dL 1.02* 1.13* 1.03* 1.01*   GLUCOSE mg/dL 190* 173* 286* 149*     Results from last 7 days   Lab Units 02/03/25  0402 02/02/25  0351 02/01/25  0305 01/31/25  0600 01/30/25  1224 01/30/25  0334   WBC 10*3/mm3 6.03 6.59 8.12 11.12*  --  11.28*   HEMOGLOBIN g/dL 7.8* 7.4* 8.4* 7.6* 8.2* 7.7*     Results from last 7 days   Lab Units 01/29/25  0314 01/28/25  1321 01/27/25  1310   INR  1.24* 1.43* 0.91     Results from last 7 days   Lab Units 01/29/25  0314 01/27/25  1310   ALT (SGPT) U/L 23 14   AST (SGOT) U/L 49* 15     No results found for: \"DIGOXIN\"   No results found for: \"TSH\"        Invalid input(s): \"LDLCALC\"    Estimated Creatinine Clearance: 39.9 mL/min (A) (by C-G formula based on SCr of 1.02 mg/dL (H)).        Imaging (last 24 hr):   I personally reviewed the most recent chest x-ray and other pertinent imaging studies.  Results for orders placed during the hospital encounter of 01/28/25    XR Chest 1 View    Narrative  XR CHEST 1 VW    Date of Exam: 2/3/2025 3:34 AM EST    Indication: Postop day 6 status post CABG    Comparison: Portable chest dated 2/2/2024 and 2/1/2024    Findings:  Bilateral chest tubes remain in place. There is a very small left apical pneumothorax. No definitive right-sided pneumothorax. Cardiac, hilar, and mediastinal silhouettes are unchanged with evidence of previous CABG and mild cardiomegaly. Pulmonary  vascularity is normal. There is improved aeration with residual linear atelectasis in the right lower lung. Lungs otherwise are grossly clear. No definite pleural " effusions. No acute bony abnormalities are demonstrated. The trachea is midline.    Impression  Impression:  1.Bilateral chest tubes remain in place. There is a very small left apical pneumothorax. No definite right-sided pneumothorax.  2.Improved aeration with residual linear atelectasis in the right lower lung.        Electronically Signed: Sean Tellez,   2/3/2025 7:13 AM EST  Workstation ID: [id number]        Last ECHO:  Results for orders placed in visit on 01/28/25    Intra-Op Anesthesia EMRE  1/28/2025    Echocardiogram Comments:  Moderately impaired lv function  EF estimated at 38%  Wallace of LV is akinetic/dyskinetic  Nl valvular function  Images recorded of RADHA        PROBLEM LIST:     CAD s/p CABG x 5 1/28/2025    Hypertension    Hyperlipidemia    Type 2 diabetes mellitus        Initial cardiac assessment: 79-year-old female found to have multivessel CAD at cath in October [facility] went to Dr. Del Castillo for second opinion, status post 5V CABG 1/28/2025, postop EF 38%.    ASSESSMENT/PLAN:  1.  CAD:  Status post 5V CABG  Continue aspirin and statin  Continue metoprolol low-dose  Doing well off midodrine now    Bumex 2 mg IV x 1 today  Keep net negative  Chest tube to be removed    Hopeful for discharge in the next 24 to 48 hours    2.  Acute on chronic systolic heart failure, EF 40-45%:  Fairly euvolemic currently, EF on EMRE immediately postop 38%  Echo 1/31/25 EF 40-45%, with normal valves  Started Jardiance 10 mg daily 2/1/2025, tolerating well  Continue beta-blocker      3.  Acute on CKD stage 3B:  Maintain adequate perfusing pressure  Renal function remains stable, check daily BMP      4.  Hyperlipidemia:  Goal LDL less than 70, continue atorvastatin 40 mg daily    Discussed with patient and bedside nurse      Sravan Arboleda MD  2/3/2025    08:49 EST

## 2025-02-04 ENCOUNTER — APPOINTMENT (OUTPATIENT)
Dept: GENERAL RADIOLOGY | Facility: HOSPITAL | Age: 80
DRG: 235 | End: 2025-02-04
Payer: MEDICARE

## 2025-02-04 LAB
ANION GAP SERPL CALCULATED.3IONS-SCNC: 10 MMOL/L (ref 5–15)
BUN SERPL-MCNC: 28 MG/DL (ref 8–23)
BUN/CREAT SERPL: 25.9 (ref 7–25)
CALCIUM SPEC-SCNC: 8.6 MG/DL (ref 8.6–10.5)
CHLORIDE SERPL-SCNC: 101 MMOL/L (ref 98–107)
CO2 SERPL-SCNC: 27 MMOL/L (ref 22–29)
CREAT SERPL-MCNC: 1.08 MG/DL (ref 0.57–1)
DEPRECATED RDW RBC AUTO: 48.9 FL (ref 37–54)
EGFRCR SERPLBLD CKD-EPI 2021: 53 ML/MIN/1.73
ERYTHROCYTE [DISTWIDTH] IN BLOOD BY AUTOMATED COUNT: 14.5 % (ref 12.3–15.4)
GLUCOSE BLDC GLUCOMTR-MCNC: 153 MG/DL (ref 70–130)
GLUCOSE BLDC GLUCOMTR-MCNC: 166 MG/DL (ref 70–130)
GLUCOSE BLDC GLUCOMTR-MCNC: 188 MG/DL (ref 70–130)
GLUCOSE BLDC GLUCOMTR-MCNC: 199 MG/DL (ref 70–130)
GLUCOSE BLDC GLUCOMTR-MCNC: 229 MG/DL (ref 70–130)
GLUCOSE SERPL-MCNC: 134 MG/DL (ref 65–99)
HCT VFR BLD AUTO: 24.2 % (ref 34–46.6)
HGB BLD-MCNC: 7.7 G/DL (ref 12–15.9)
MCH RBC QN AUTO: 29.5 PG (ref 26.6–33)
MCHC RBC AUTO-ENTMCNC: 31.8 G/DL (ref 31.5–35.7)
MCV RBC AUTO: 92.7 FL (ref 79–97)
PLATELET # BLD AUTO: 253 10*3/MM3 (ref 140–450)
PMV BLD AUTO: 11.3 FL (ref 6–12)
POTASSIUM SERPL-SCNC: 4.5 MMOL/L (ref 3.5–5.2)
RBC # BLD AUTO: 2.61 10*6/MM3 (ref 3.77–5.28)
SODIUM SERPL-SCNC: 138 MMOL/L (ref 136–145)
WBC NRBC COR # BLD AUTO: 5.61 10*3/MM3 (ref 3.4–10.8)

## 2025-02-04 PROCEDURE — 99231 SBSQ HOSP IP/OBS SF/LOW 25: CPT | Performed by: INTERNAL MEDICINE

## 2025-02-04 PROCEDURE — 63710000001 INSULIN LISPRO (HUMAN) PER 5 UNITS: Performed by: INTERNAL MEDICINE

## 2025-02-04 PROCEDURE — 82948 REAGENT STRIP/BLOOD GLUCOSE: CPT

## 2025-02-04 PROCEDURE — 80048 BASIC METABOLIC PNL TOTAL CA: CPT | Performed by: PHYSICIAN ASSISTANT

## 2025-02-04 PROCEDURE — 85027 COMPLETE CBC AUTOMATED: CPT | Performed by: PHYSICIAN ASSISTANT

## 2025-02-04 PROCEDURE — 71045 X-RAY EXAM CHEST 1 VIEW: CPT

## 2025-02-04 PROCEDURE — 63710000001 INSULIN GLARGINE PER 5 UNITS: Performed by: INTERNAL MEDICINE

## 2025-02-04 PROCEDURE — 97530 THERAPEUTIC ACTIVITIES: CPT

## 2025-02-04 PROCEDURE — 99232 SBSQ HOSP IP/OBS MODERATE 35: CPT | Performed by: INTERNAL MEDICINE

## 2025-02-04 RX ADMIN — ACETAMINOPHEN 1000 MG: 500 TABLET, FILM COATED ORAL at 05:23

## 2025-02-04 RX ADMIN — INSULIN LISPRO 3 UNITS: 100 INJECTION, SOLUTION INTRAVENOUS; SUBCUTANEOUS at 18:30

## 2025-02-04 RX ADMIN — INSULIN LISPRO 4 UNITS: 100 INJECTION, SOLUTION INTRAVENOUS; SUBCUTANEOUS at 12:07

## 2025-02-04 RX ADMIN — GABAPENTIN 100 MG: 100 CAPSULE ORAL at 09:29

## 2025-02-04 RX ADMIN — INSULIN LISPRO 1 UNITS: 100 INJECTION, SOLUTION INTRAVENOUS; SUBCUTANEOUS at 21:31

## 2025-02-04 RX ADMIN — HYDROCODONE BITARTRATE AND ACETAMINOPHEN 1 TABLET: 7.5; 325 TABLET ORAL at 07:26

## 2025-02-04 RX ADMIN — INSULIN LISPRO 3 UNITS: 100 INJECTION, SOLUTION INTRAVENOUS; SUBCUTANEOUS at 09:30

## 2025-02-04 RX ADMIN — SCOPOLAMINE 1 PATCH: 1.5 PATCH, EXTENDED RELEASE TRANSDERMAL at 09:29

## 2025-02-04 RX ADMIN — GABAPENTIN 100 MG: 100 CAPSULE ORAL at 20:28

## 2025-02-04 RX ADMIN — INSULIN GLARGINE 14 UNITS: 100 INJECTION, SOLUTION SUBCUTANEOUS at 09:28

## 2025-02-04 RX ADMIN — ACETAMINOPHEN 1000 MG: 500 TABLET, FILM COATED ORAL at 21:31

## 2025-02-04 RX ADMIN — BISACODYL 10 MG: 10 SUPPOSITORY RECTAL at 13:33

## 2025-02-04 RX ADMIN — ATORVASTATIN CALCIUM 40 MG: 40 TABLET, FILM COATED ORAL at 20:29

## 2025-02-04 RX ADMIN — ASPIRIN 325 MG: 325 TABLET ORAL at 09:29

## 2025-02-04 RX ADMIN — Medication 12.5 MG: at 20:31

## 2025-02-04 RX ADMIN — EMPAGLIFLOZIN 10 MG: 10 TABLET, FILM COATED ORAL at 09:29

## 2025-02-04 NOTE — PLAN OF CARE
Goal Outcome Evaluation:              Outcome Evaluation: Pt got chest tubes dc'd. Orders to tele. On room air. Ambulating 220 feet with 1 assist. VSS at this time.

## 2025-02-04 NOTE — THERAPY TREATMENT NOTE
Patient Name: Abena Washington  : 1945    MRN: [id number]                              Today's Date: 2025       Admit Date: 2025    Visit Dx:     ICD-10-CM ICD-9-CM   1. Coronary artery disease involving native coronary artery of native heart with angina pectoris  I25.119 414.01     413.9   2. Coronary artery disease involving native coronary artery of native heart, unspecified whether angina present  I25.10 414.01     Patient Active Problem List   Diagnosis    CAD s/p CABG x 5 2025    Hypertension    Hyperlipidemia    Type 2 diabetes mellitus     Past Medical History:   Diagnosis Date    Anemia     Anxiety and depression     Arthritis     Cataracts, bilateral     Coronary artery disease     Delayed emergence from anesthesia     Diabetes mellitus     Heart attack     Hyperlipidemia     Hypertension     Nausea     Peripheral neuropathy     PONV (postoperative nausea and vomiting)     Skin melanoma     Wears glasses      Past Surgical History:   Procedure Laterality Date    CARDIAC CATHETERIZATION      CATARACT EXTRACTION W/ INTRAOCULAR LENS IMPLANT Left 2023    Procedure: CATARACT PHACO EXTRACTION WITH INTRAOCULAR LENS IMPLANT LEFT;  Surgeon: Mina Hendrickson MD;  Location: [facility] OR;  Service: Ophthalmology;  Laterality: Left;    CATARACT EXTRACTION W/ INTRAOCULAR LENS IMPLANT Right 2023    Procedure: CATARACT PHACO EXTRACTION WITH INTRAOCULAR LENS IMPLANT RIGHT;  Surgeon: Mina Hendrickson MD;  Location: [facility] OR;  Service: Ophthalmology;  Laterality: Right;    CHOLECYSTECTOMY      COLONOSCOPY      CORONARY ARTERY BYPASS GRAFT N/A 2025    Procedure: MEDIAN STERNOTOMY, CORONARY ARTERY BYPASS GRAFTING X 5, UTILIZING THE LEFT INTERNAL MAMMARY ARTERY, ENDOSCOPIC VEIN HARVESTING OF THE LEFT SAPHENOUS VEIN, TRANSESOPHAGEAL ECHOCARDIOGRAM WITH ANESTHESIA;  Surgeon: Zeferino Del Castillo MD;  Location: [facility] OR;  Service: Cardiothoracic;  Laterality: N/A;    ENDOSCOPY       HYSTERECTOMY      SKIN CANCER EXCISION      TUBAL ABDOMINAL LIGATION        General Information       Row Name 02/04/25 1327          Physical Therapy Time and Intention    Document Type therapy note (daily note)  -KG     Mode of Treatment physical therapy  -KG       Row Name 02/04/25 1327          General Information    Existing Precautions/Restrictions cardiac;fall;sternal  -KG       Row Name 02/04/25 1327          Cognition    Orientation Status (Cognition) oriented x 3  -KG       Row Name 02/04/25 1327          Safety Issues/Impairments Affecting Functional Mobility    Safety Issues Affecting Function (Mobility) awareness of need for assistance;insight into deficits/self-awareness;safety precaution awareness;safety precautions follow-through/compliance  -KG     Impairments Affecting Function (Mobility) balance;coordination;endurance/activity tolerance;postural/trunk control;pain;strength  -KG               User Key  (r) = Recorded By, (t) = Taken By, (c) = Cosigned By      Initials Name Provider Type    KG Patrica Rodriguez, PT Physical Therapist                   Mobility       Row Name 02/04/25 1327          Bed Mobility    Comment, (Bed Mobility) UIC  -KG       Row Name 02/04/25 1327          Transfers    Comment, (Transfers) VC's for sequencing and safe hand placement to maintain sternal precautions. Toilet transfer to [facility] [place] with CGA.  -KG       Row Name 02/04/25 1327          Sit-Stand Transfer    Sit-Stand [place] (Transfers) contact guard;verbal cues  -KG       Row Name 02/04/25 1327          Gait/Stairs (Locomotion)    [place] Level (Gait) contact guard;verbal cues  -KG     Assistive Device (Gait) other (see comments)  B UE support on tripod monitor  -KG     Distance in Feet (Gait) 220  -KG     Deviations/Abnormal Patterns (Gait) base of support, narrow;jad decreased;stride length decreased  -KG     Bilateral Gait Deviations forward flexed posture;heel strike decreased  -KG     Comment,  (Gait/Stairs) Pt demonstrated step through gait pattern with slow jad and decreased step length. VC's for upright posture. Pt demonstrated adequate balance and stability. Declined additional ambulation despite encouragement due to c/o L sided chest pain; RN notified.  -KG               User Key  (r) = Recorded By, (t) = Taken By, (c) = Cosigned By      Initials Name Provider Type    KG Patrica Rodriguez, PT Physical Therapist                   Obj/Interventions       Row Name 02/04/25 1329          Balance    Balance Assessment sitting static balance;standing static balance;standing dynamic balance  -KG     Static Sitting Balance standby assist  -KG     Position, Sitting Balance unsupported;sitting in chair  -KG     Static Standing Balance contact guard  -KG     Dynamic Standing Balance contact guard  -KG     Position/Device Used, Standing Balance supported  -KG               User Key  (r) = Recorded By, (t) = Taken By, (c) = Cosigned By      Initials Name Provider Type    KG Patrica Rodriguez, PT Physical Therapist                   Goals/Plan    No documentation.                  Clinical Impression       Row Name 02/04/25 1333          Pain    Additional Documentation Pain Scale: FACES Pre/Post-Treatment (Group)  -KG       Row Name 02/04/25 1333          Pain Scale: FACES Pre/Post-Treatment    Pain: FACES Scale, Pretreatment 2-->hurts little bit  -KG     Posttreatment Pain Rating 4-->hurts little more  -KG       Row Name 02/04/25 1333          Plan of Care Review    Plan of Care Reviewed With patient  -KG     Progress no change  -KG     Outcome Evaluation Pt ambulated 220ft with CGA and B UE support on tripod monitor. Pt demonstrated adequate balance and stability throughout ambulation. Cues for upright posture and increased stride length. Pt declined additional ambulation despite encouragement due to c/o chest pain. Continue to recommend PT skilled care and D/C home with assistance and HH PT  services.  -KG       Row Name 02/04/25 1333          Vital Signs    Pre Systolic BP Rehab 114  -KG     Pre Treatment Diastolic BP 93  -KG     Post Systolic BP Rehab 118  -KG     Post Treatment Diastolic BP 46  -KG     Pretreatment Heart Rate (beats/min) 56  -KG     Posttreatment Heart Rate (beats/min) 56  -KG     Pre SpO2 (%) 93  -KG     O2 Delivery Pre Treatment room air  -KG     Post SpO2 (%) 93  -KG     O2 Delivery Post Treatment room air  -KG     Pre Patient Position Sitting  -KG     Intra Patient Position Standing  -KG     Post Patient Position Sitting  -KG       Row Name 02/04/25 1333          Positioning and Restraints    Pre-Treatment Position sitting in chair/recliner  -KG     Post Treatment Position chair  -KG     In Chair notified nsg;reclined;call light within reach;encouraged to call for assist;RUE elevated;LUE elevated;legs elevated  -KG               User Key  (r) = Recorded By, (t) = Taken By, (c) = Cosigned By      Initials Name Provider Type    KG Patrica Rodriguez, PT Physical Therapist                   Outcome Measures       Row Name 02/04/25 1337 02/04/25 0800       How much help from another person do you currently need...    Turning from your back to your side while in flat bed without using bedrails? 3  -KG 4  -KR    Moving from lying on back to sitting on the side of a flat bed without bedrails? 3  -KG 3  -KR    Moving to and from a bed to a chair (including a wheelchair)? 3  -KG 3  -KR    Standing up from a chair using your arms (e.g., wheelchair, bedside chair)? 3  -KG 3  -KR    Climbing 3-5 steps with a railing? 3  -KG 2  -KR    To walk in hospital room? 3  -KG 3  -KR    AM-PAC 6 Clicks Score (PT) 18  -KG 18  -KR    Highest Level of Mobility Goal 6 --> Walk 10 steps or more  -KG 6 --> Walk 10 steps or more  -KR      Row Name 02/04/25 1337          Functional Assessment    Outcome Measure Options AM-PAC 6 Clicks Basic Mobility (PT)  -KG               User Key  (r) = Recorded By, (t) =  Taken By, (c) = Cosigned By      Initials Name Provider Type    KG Patrica Rodriguez, PT Physical Therapist    Stephanie Reid, RN Registered Nurse                                 Physical Therapy Education       Title: PT OT SLP Therapies (In Progress)       Topic: Physical Therapy (Done)       Point: Mobility training (Done)       Learning Progress Summary            Patient Acceptance, E, VU,NR by KG at 2/4/2025 0849    Acceptance, E, VU,NR by KG at 2/3/2025 1009    Acceptance, E, NR by AE at 2/1/2025 0813    Acceptance, E, NR by KG at 1/31/2025 0901    Acceptance, E, NR by KG at 1/30/2025 1449                      Point: Home exercise program (Done)       Learning Progress Summary            Patient Acceptance, E, VU,NR by KG at 2/4/2025 0849    Acceptance, E, VU,NR by KG at 2/3/2025 1009    Acceptance, E, NR by AE at 2/1/2025 0813    Acceptance, E, NR by KG at 1/31/2025 0901                      Point: Body mechanics (Done)       Learning Progress Summary            Patient Acceptance, E, VU,NR by KG at 2/4/2025 0849    Acceptance, E, VU,NR by KG at 2/3/2025 1009    Acceptance, E, NR by AE at 2/1/2025 0813    Acceptance, E, NR by KG at 1/31/2025 0901    Acceptance, E, NR by KG at 1/30/2025 1449                      Point: Precautions (Done)       Learning Progress Summary            Patient Acceptance, E, VU,NR by KG at 2/4/2025 0849    Acceptance, E, VU,NR by KG at 2/3/2025 1009    Acceptance, E, NR by AE at 2/1/2025 0813    Acceptance, E, NR by KG at 1/31/2025 0901    Acceptance, E, NR by KG at 1/30/2025 1449                                      User Key       Initials Effective Dates Name Provider Type Discipline    KG 05/22/20 -  Patrica Rodriguez, PT Physical Therapist PT    AE 09/21/21 -  Bryce Castillo PT Physical Therapist PT                  PT Recommendation and Plan  Planned Therapy Interventions (PT): balance training, bed mobility training, gait training, strengthening, transfer  training  Progress: no change  Outcome Evaluation: Pt ambulated 220ft with CGA and B UE support on tripod monitor. Pt demonstrated adequate balance and stability throughout ambulation. Cues for upright posture and increased stride length. Pt declined additional ambulation despite encouragement due to c/o chest pain. Continue to recommend PT skilled care and D/C home with assistance and  PT services.     Time Calculation:   PT Evaluation Complexity  History, PT Evaluation Complexity: 3 or more personal factors and/or comorbidities  Examination of Body Systems (PT Eval Complexity): total of 3 or more elements  Clinical Presentation (PT Evaluation Complexity): evolving  Clinical Decision Making (PT Evaluation Complexity): moderate complexity  Overall Complexity (PT Evaluation Complexity): moderate complexity     PT Charges       Row Name 02/04/25 0849             Time Calculation    Start Time 0849  -KG      PT Received On 02/04/25  -KG      PT Goal Re-Cert Due Date 02/09/25  -KG         Time Calculation- PT    Total Timed Code Minutes- PT 23 minute(s)  -KG         Timed Charges    96598 - PT Therapeutic Activity Minutes 23  -KG         Total Minutes    Timed Charges Total Minutes 23  -KG       Total Minutes 23  -KG                User Key  (r) = Recorded By, (t) = Taken By, (c) = Cosigned By      Initials Name Provider Type    KG Patrica Rodriguez, PT Physical Therapist                  Therapy Charges for Today       Code Description Service Date Service Provider Modifiers Qty    16808663946 HC PT THERAPEUTIC ACT EA 15 MIN 2/3/2025 Patrica Rodriguez, PT GP 2    94895619190 HC PT THERAPEUTIC ACT EA 15 MIN 2/4/2025 Patrica Rodriguez, PT GP 2            PT G-Codes  Outcome Measure Options: AM-PAC 6 Clicks Basic Mobility (PT)  AM-PAC 6 Clicks Score (PT): 18  AM-PAC 6 Clicks Score (OT): 15  PT Discharge Summary  Anticipated Discharge Disposition (PT): home with assist, home with home health    Erica ROBERTS  Jennifer, PT  2/4/2025

## 2025-02-04 NOTE — PLAN OF CARE
Goal Outcome Evaluation:  Plan of Care Reviewed With: patient        Progress: no change  Outcome Evaluation: Pt ambulated 220ft with CGA and B UE support on tripod monitor. Pt demonstrated adequate balance and stability throughout ambulation. Cues for upright posture and increased stride length. Pt declined additional ambulation despite encouragement due to c/o chest pain. Continue to recommend PT skilled care and D/C home with assistance and  PT services.    Anticipated Discharge Disposition (PT): home with assist, home with home health

## 2025-02-04 NOTE — PROGRESS NOTES
INTENSIVIST   PROGRESS NOTE        SUBJECTIVE     Abena 79 y.o. female is followed for: No chief complaint on file.       CAD s/p CABG x 5 2025    Hypertension    Hyperlipidemia    Type 2 diabetes mellitus    As an Intensivist, we provide an integrated approach to the ICU patient and family, medical management of comorbid conditions, including but not limited to electrolytes, glycemic control, organ dysfunction, lead interdisciplinary rounds and coordinate the care with all other services, including those from other specialists.     Interval History:  POD: 7 Days Post-Op Procedure(s) (LRB):  MEDIAN STERNOTOMY, CORONARY ARTERY BYPASS GRAFTING X 5, UTILIZING THE LEFT INTERNAL MAMMARY ARTERY, ENDOSCOPIC VEIN HARVESTING OF THE LEFT SAPHENOUS VEIN, TRANSESOPHAGEAL ECHOCARDIOGRAM WITH ANESTHESIA (N/A)     Elbow and neck pain.    Otherwise doing well.    On ambient air.    Last Bowel Movement: 25 (25)    Temp  Min: 97.7 °F (36.5 °C)  Max: 98.8 °F (37.1 °C)       History     Last Reviewed by Akash Land MD on 2/3/2025 at 12:44 PM    Sections Reviewed    Medical, Surgical, Family, Tobacco, Alcohol, Drug Use, Sexual Activity,   Social Documentation    Problem list reviewed by Akash Land MD on 2/3/2025 at 12:44 PM  Medicines reviewed by Akash Land MD on 2/3/2025 at 12:44 PM  Allergies reviewed by Akash Land MD on 2/3/2025 at 12:44 PM       The patient's relevant past medical, surgical and social history were reviewed and updated in Epic as appropriate.        OBJECTIVE     Physical Examination    Vitals:  Temp: 98.1 °F (36.7 °C) (25) Temp  Min: 97.7 °F (36.5 °C)  Max: 98.8 °F (37.1 °C)   Temp core:      BP: 128/55 (25) BP  Min: 105/56  Max: 140/61   MAP (non-invasive) Noninvasive MAP (mmHg): 98 (25) Noninvasive MAP (mmHg)  Av.2  Min: 54  Max: 107   Pulse: 56 (2530) Pulse  Min: 50  Max: 67   Resp: 20 (25) Resp  Min: 16  Max: 20    SpO2: 94 % (02/04/25 0630) SpO2  Min: 91 %  Max: 98 %   Device: room air (02/04/25 0600)    Flow Rate: 2 (02/03/25 0400) No data recorded     Intake/Ouptut 24 hrs (7:00AM - 6:59 AM)  Intake & Output (last 2 days)         02/02 0701 02/03 0700 02/03 0701 02/04 0700 02/04 0701 02/05 0700    P.O. 720 1200     Total Intake(mL/kg) 720 (13.2) 1200 (21.9)     Urine (mL/kg/hr) 1700 (1.3) 2675 (2)     Stool       Chest Tube 300 30     Total Output 2000 2705     Net -1280 -1505            Urine Unmeasured Occurrence  1 x             Medications (drips):             01/29/25 0634   Weight: 54.7 kg (120 lb 9.5 oz) (Per Ruthie Dela Cruz RN charting on 1-27-25)      Telemetry:  Rhythm: sinus bradycardia (02/04/25 0600)     QTc Interval (Sec): 320 (02/03/25 0800)   Constitutional:  No acute distress.   Cardiovascular: RRR.    Respiratory: Normal breath sounds  No adventitious sounds   Abdominal:  Soft with no tenderness.   Extremities: No Edema   Neurological:   Alert, Oriented, Cooperative.  Best Eye Response: 4-->(E4) spontaneous (02/04/25 0600)  Best Motor Response: 6-->(M6) obeys commands (02/04/25 0600)  Best Verbal Response: 5-->(V5) oriented (02/04/25 0600)  [place] Coma Scale Score: 15 (02/04/25 0600)     Results Reviewed:  Laboratory  Microbiology  Radiology  Pathology    Hematology:  Results from last 7 days   Lab Units 02/04/25 0411 02/03/25  0402 02/02/25  0351   WBC 10*3/mm3 5.61 6.03 6.59   HEMOGLOBIN g/dL 7.7* 7.8* 7.4*   MCV fL 92.7 93.5 93.3   PLATELETS 10*3/mm3 253 219 171     Chemistry:  Estimated Creatinine Clearance: 37.7 mL/min (A) (by C-G formula based on SCr of 1.08 mg/dL (H)).    Results from last 7 days   Lab Units 02/04/25 0411 02/03/25  0402   SODIUM mmol/L 138 138   POTASSIUM mmol/L 4.5 4.9   CHLORIDE mmol/L 101 104   CO2 mmol/L 27.0 27.0   BUN mg/dL 28* 30*   CREATININE mg/dL 1.08* 1.02*   GLUCOSE mg/dL 134* 190*     Results from last 7 days   Lab Units 02/04/25 0411 02/03/25  0402  02/02/25  0351 02/01/25  0305 01/31/25  0600 01/28/25  1755 01/28/25  1321   IONIZED CALCIUM mmol/L  --   --   --   --   --   --  1.18   CALCIUM mg/dL 8.6 8.1*   < > 7.9* 8.0*   < > 9.9   MAGNESIUM mg/dL  --   --   --  2.2 2.2   < > 3.1*   PHOSPHORUS mg/dL  --   --   --  2.5 2.3*   < > 3.2    < > = values in this interval not displayed.     Images:  XR Chest 1 View    Result Date: 2/4/2025  Impression: 1.Stable tiny residual left apical pneumothorax. 2.Stable right-sided discoid atelectasis. 3.Small left pleural effusion. Electronically Signed: Dusty Zaidi MD  2/4/2025 7:26 AM EST  Workstation ID: [id number]    XR Chest 1 View    Result Date: 2/3/2025  Impression: 1.Bilateral chest tubes remain in place. There is a very small left apical pneumothorax. No definite right-sided pneumothorax. 2.Improved aeration with residual linear atelectasis in the right lower lung. Electronically Signed: Sean Tellez DO  2/3/2025 7:13 AM EST  Workstation ID: [id number]     Echo:  Results for orders placed during the hospital encounter of 01/28/25    Adult Transthoracic Echo Complete W/ Cont if Necessary Per Protocol    Interpretation Summary    Left ventricular systolic function is mildly decreased. Calculated left ventricular EF = 43.7% Left ventricular ejection fraction appears to be 41 - 45%.    Left ventricular diastolic function was indeterminate.    There is calcification of the aortic valve.    Estimated right ventricular systolic pressure from tricuspid regurgitation is normal (<35 mmHg).      Results: Reviewed.  I reviewed the patient's new laboratory and imaging results.  I independently reviewed the patient's new images.    Medications: Reviewed.    Assessment   A/P   Assessment/Management/Treatment Plan:  Hospital:  LOS: 7 days   ICU: 6d 19h     Active Hospital Problems    Diagnosis  POA    **CAD s/p CABG x 5 1/28/2025 [I25.10]  Yes    Hyperlipidemia [E78.5]  Yes    Hypertension [I10]  Yes    Type 2 diabetes mellitus  [E11.9]  Yes     Abena is a 79 y.o. female admitted on 1/28/2025 with Coronary artery disease involving native coronary artery of native heart, unspecified whether angina present [I25.10]  CAD (coronary artery disease) [I25.10]    She was  recently seen by cardiology team with complaints of intermittent/exertional chest pressure.  Echo showed EF of 45 to 50%.  Underwent stress testing which was abnormal and underwent subsequently left heart cath which showed multivessel coronary disease.  Bilateral carotid duplex did not show any significant carotid disease.  She underwent coronary bypass graft surgery x 5 by Dr. Del Castillo on 1/28.      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   HTN  Dyslipidemia   Hematology  NC Anemia  CKD 3B.  Endocrine   Body mass index is 22.06 kg/m². Normal: 18.5-24.9kg/m2  Type 2 diabetes.    Lab Results   Lab Value Date/Time    HGBA1C 8.30 (H) 01/27/2025 1310     Results from last 7 days   Lab Units 02/04/25  0723 02/03/25 2002 02/03/25  1626 02/03/25  1132 02/03/25  0734 02/02/25  1951 02/02/25  1634 02/02/25  1125   GLUCOSE mg/dL 166* 217* 184* 254* 178* 261* 274* 215*       Diet: Diet: Cardiac, Diabetic; Healthy Heart (2-3 Na+); Consistent Carbohydrate; Fluid Consistency: Thin (IDDSI 0)   Advance Directives: Code Status and Medical Interventions: CPR (Attempt to Resuscitate); Full Support   Ordered at: 01/28/25 1254     Code Status (Patient has no pulse and is not breathing):    CPR (Attempt to Resuscitate)     Medical Interventions (Patient has pulse or is breathing):    Full Support        VTE Prophylaxis:  Mechanical VTE prophylaxis orders are present.         In brief:  Goal: Glucose < 180 mg/dL.   Insulin: Basal, Prandial and Correction.  Disposition: Transfer to Telemetry Unit - Probably home tomorrow as per CT Surgery.    Plan of care and goals reviewed during  interdisciplinary rounds.  I discussed the patient's findings and my recommendations with patient and nursing staff    MDM:    Problem(s) High due to: Acute or Chronic illness or injury that may poses a threat to life or bodily function  Data: Low due to: Review of result(s) of each unique test    Low    [] Primary Attending Intensive Care Medicine - Nutrition Support   [x] Consultant    Copied text in this note has been reviewed and is accurate as of 02/04/25

## 2025-02-04 NOTE — PROGRESS NOTES
"Enter Query Response Below      Query Response: Acute kidney injury (EDGARDO) was not supported              If applicable, please update the problem list.   Patient: Abena Washington        : 1945  Account: [id number]           Admit Date:         How to Respond to this query:       a. Click New Note     b. Answer query within the yellow box.                c. Update the Problem List, if applicable.      If you have any questions about this query contact me at: [email or web address]     :     Patient with CKD IIIb.  Labs monitored this admission.   Creatinine 1.06, 1.18.   Creatinine 1.32.   Creatinine 1.26.   Creatinine 1.01. Per CTS progress note \"Monitor renal function. Creatinine at baseline.\"  2/3 Creatinine 1.02.  \"Will slowly initiate GDMT, however hypotension and EDGARDO will impede progress.\" is documented in the Cardiology progress notes ,  and .    After study, was acute kidney injury (EDGARDO) clinically supported during this admission?    Acute kidney injury (EDGARDO) was supported with additional clinical indicators:____________  Acute kidney injury (EDGARDO) was not supported  Other- specify_____________  Unable to determine     EDGARDO is defined by KDIGO as any of the following:  Increase in creatinine > 0.3 mg/dl within 48 hours or less; or   Increase in creatinine level to > 1.5x baseline (historical or measure), which is known or presumed to have occurred within the prior 7 days   Urine volume <0.5 ml/kg/h for 6 hours.  Reference article: http://www.kdigo.org/clinical_practice_guidelines/pdf/KDIGO%20AKI%20Guideline.pdf (as published in [employer] Supplements, The Official Journal of the International Society of Nephrology, vol. 2, issue 1, 2012.)    By submitting this query, we are merely seeking further clarification of documentation to accurately reflect all conditions that you are monitoring, evaluating, treating or that extend the hospitalization or utilize " additional resources of care. Please utilize your independent clinical judgment when addressing the question(s) above.     This query and your response, once completed, will be entered into the legal medical record.    Sincerely,  Misty Evans RN  Clinical Documentation Integrity Program

## 2025-02-04 NOTE — PLAN OF CARE
Goal Outcome Evaluation:  Plan of Care Reviewed With: patient        Progress: improving     Pt ambu x 220ft twice, did well. A&Ox4. VSS on room air. BM x 1, large. Afebrile. Pain managed with PRNs. Son at bedside on/off throughout shift.

## 2025-02-04 NOTE — PROGRESS NOTES
[facility]  INPATIENT PROGRESS NOTE         [facility]    2/4/2025      PATIENT IDENTIFICATION:   Name:  Abena Washington      MRN:  [id number]     79 y.o.  female             Reason for visit: Chronic systolic heart failure, CAD, hypertension, hyperlipidemia      SUBJECTIVE:   She was able to walk once today but cannot walk a second time due to weakness, encouraged her to walk with assistance later today.  Chest tube is out she feels better with this, blood pressure stable today     OBJECTIVE:  Vitals:    02/04/25 1100 02/04/25 1200 02/04/25 1300 02/04/25 1400   BP:  113/55 98/78 113/46   BP Location:  Right arm  Right arm   Patient Position:  Sitting  Sitting   Pulse: 57 57 61 67   Resp:  16  20   Temp:  98 °F (36.7 °C)     TempSrc:  Oral     SpO2: 94% 96% 97% 94%   Weight:       Height:         FiO2 (%): 40 %     Body mass index is 22.06 kg/m².    Intake/Output Summary (Last 24 hours) at 2/4/2025 1504  Last data filed at 2/4/2025 0900  Gross per 24 hour   Intake 960 ml   Output 300 ml   Net 660 ml       Telemetry: Personally reviewed, normal sinus rhythm, no arrhythmia     Exam::   General Appearance:   · well developed  · well nourished  Neck:  · thyroid not enlarged  · supple  Respiratory:  · no respiratory distress  · normal breath sounds  · no rales  Cardiovascular:  · no jugular venous distention  · regular rhythm  · apical impulse normal  · S1 normal, S2 normal  · no S3, no S4   · no murmur  · no rub, no thrill  · carotid pulses normal; no bruit  · pedal pulses normal  · lower extremity edema: none    Skin:   warm, dry          No Known Allergies  Scheduled meds:       acetaminophen, 1,000 mg, Oral, Q8H  aspirin, 325 mg, Oral, Daily  atorvastatin, 40 mg, Oral, Nightly  empagliflozin, 10 mg, Oral, Daily  gabapentin, 100 mg, Oral, TID  insulin glargine, 1-200 Units, Subcutaneous, Daily - Glucommander  insulin lispro, 1-200 Units, Subcutaneous, 4x Daily  "With Meals & Nightly  metoprolol tartrate, 12.5 mg, Oral, Q12H  pharmacy consult - MTM, , Not Applicable, Daily  Scopolamine, 1 patch, Transdermal, Q72H  senna-docusate sodium, 2 tablet, Oral, BID      IV meds:                           Data Review:  Results from last 7 days   Lab Units 02/04/25 0411 02/03/25  0402 02/02/25  0351 02/01/25  0305   SODIUM mmol/L 138 138 138 136   BUN mg/dL 28* 30* 31* 29*   CREATININE mg/dL 1.08* 1.02* 1.13* 1.03*   GLUCOSE mg/dL 134* 190* 173* 286*     Results from last 7 days   Lab Units 02/04/25 0411 02/03/25  0402 02/02/25  0351 02/01/25  0305 01/31/25  0600   WBC 10*3/mm3 5.61 6.03 6.59 8.12 11.12*   HEMOGLOBIN g/dL 7.7* 7.8* 7.4* 8.4* 7.6*     Results from last 7 days   Lab Units 01/29/25  0314   INR  1.24*     Results from last 7 days   Lab Units 01/29/25  0314   ALT (SGPT) U/L 23   AST (SGOT) U/L 49*     No results found for: \"DIGOXIN\"   No results found for: \"TSH\"        Invalid input(s): \"LDLCALC\"    Estimated Creatinine Clearance: 37.7 mL/min (A) (by C-G formula based on SCr of 1.08 mg/dL (H)).        Imaging (last 24 hr):   I personally reviewed the most recent chest x-ray and other pertinent imaging studies.  Results for orders placed during the hospital encounter of 01/28/25    XR Chest 1 View    Narrative  XR CHEST 1 VW    Date of Exam: 2/4/2025 1:28 AM EST    Indication: postop    Comparison: 2/3/2025    Findings:  The trachea is midline. Stable cardiomegaly and surgical changes. Stable right-sided discoid atelectasis. Stable tiny residual left apical pneumothorax. Small left pleural effusion. The lungs are otherwise clear    Impression  Impression:  1.Stable tiny residual left apical pneumothorax.  2.Stable right-sided discoid atelectasis.  3.Small left pleural effusion.        Electronically Signed: Dusty Zaidi MD  2/4/2025 7:26 AM EST  Workstation ID: [id number]        Last ECHO:  Results for orders placed in visit on 01/28/25    Intra-Op Anesthesia " EMRE  1/28/2025    Echocardiogram Comments:  Moderately impaired lv function  EF estimated at 38%  [place] of LV is akinetic/dyskinetic  Nl valvular function  Images recorded of RADHA        PROBLEM LIST:     CAD s/p CABG x 5 1/28/2025    Hypertension    Hyperlipidemia    Type 2 diabetes mellitus        Initial cardiac assessment: 79-year-old female found to have multivessel CAD at cath in October [facility] went to Dr. Del Castillo for second opinion, status post 5V CABG 1/28/2025, postop EF 38%.    ASSESSMENT/PLAN:  1.  CAD:  Status post 5V CABG  Continue aspirin and statin  Continue metoprolol low-dose  Doing well off midodrine now    No need for diuretics today    2.  Acute on chronic systolic heart failure, EF 40-45%:  Fairly euvolemic currently, EF on EMRE immediately postop 38%  Echo 1/31/25 EF 40-45%, with normal valves  Started Jardiance 10 mg daily 2/1/2025, tolerating well  Continue beta-blocker      3.  CKD stage IIIb:  No evidence of EDGARDO, stable renal function currently  Continue SGLT2 inhibitor      4.  Hyperlipidemia:  Goal LDL less than 70, continue atorvastatin 40 mg daily    Discussed with patient       Sravan Arboleda MD  2/4/2025    15:04 EST

## 2025-02-04 NOTE — PROGRESS NOTES
CTS Progress Note    POD # 7 s/p CABG x 5     LOS: 7 days     Subjective  No acute events overnight. On room air. Ambulated 220 ft.    Objective    Vital Signs  Temp:  [97.7 °F (36.5 °C)-98.8 °F (37.1 °C)] 98.1 °F (36.7 °C)  Heart Rate:  [50-67] 56  Resp:  [16-20] 20  BP: (105-140)/(47-69) 128/55    Physical Exam:   General Appearance: alert, appears stated age and cooperative   Lungs: clear to auscultation     Heart: regular rhythm & normal rate, normal S1, S2 and no murmur, no gallop, no rub   Chest: sternum stable   Skin: Incision c/d/i     Results     Results from last 7 days   Lab Units 02/04/25  0411   WBC 10*3/mm3 5.61   HEMOGLOBIN g/dL 7.7*   HEMATOCRIT % 24.2*   PLATELETS 10*3/mm3 253     Results from last 7 days   Lab Units 02/04/25  0411   SODIUM mmol/L 138   POTASSIUM mmol/L 4.5   CHLORIDE mmol/L 101   CO2 mmol/L 27.0   BUN mg/dL 28*   CREATININE mg/dL 1.08*   GLUCOSE mg/dL 134*   CALCIUM mg/dL 8.6       Imaging Results (Last 24 Hours)       Procedure Component Value Units Date/Time    XR Chest 1 View [[id number]] Collected: 02/04/25 0724     Updated: 02/04/25 0729    Narrative:      XR CHEST 1 VW    Date of Exam: 2/4/2025 1:28 AM EST    Indication: postop    Comparison: 2/3/2025    Findings:  The trachea is midline. Stable cardiomegaly and surgical changes. Stable right-sided discoid atelectasis. Stable tiny residual left apical pneumothorax. Small left pleural effusion. The lungs are otherwise clear      Impression:      Impression:  1.Stable tiny residual left apical pneumothorax.  2.Stable right-sided discoid atelectasis.  3.Small left pleural effusion.        Electronically Signed: Dusty Zaidi MD    2/4/2025 7:26 AM EST    Workstation ID: [id number]          CXR 2/4/25  IMPRESSION:  Impression:  1.Stable tiny residual left apical pneumothorax.  2.Stable right-sided discoid atelectasis.  3.Small left pleural effusion.    Assessment  POD # 7 s/p CABG x 5    CAD s/p CABG x 5 1/28/2025     Hypertension    Hyperlipidemia    Type 2 diabetes mellitus    Plan   Monitor H&H  Ambulate TID  Pulmonary toilet  Awaiting telemetry bed  Probable discharge home tomorrow    Zeferino Del Castillo MD  02/04/25  07:35 EST

## 2025-02-05 ENCOUNTER — TELEPHONE (OUTPATIENT)
Dept: CARDIOLOGY | Facility: CLINIC | Age: 80
End: 2025-02-05
Payer: MEDICARE

## 2025-02-05 ENCOUNTER — APPOINTMENT (OUTPATIENT)
Dept: GENERAL RADIOLOGY | Facility: HOSPITAL | Age: 80
DRG: 235 | End: 2025-02-05
Payer: MEDICARE

## 2025-02-05 ENCOUNTER — READMISSION MANAGEMENT (OUTPATIENT)
Dept: CALL CENTER | Facility: HOSPITAL | Age: 80
End: 2025-02-05
Payer: MEDICARE

## 2025-02-05 VITALS
OXYGEN SATURATION: 98 % | SYSTOLIC BLOOD PRESSURE: 133 MMHG | HEART RATE: 61 BPM | TEMPERATURE: 97.5 F | DIASTOLIC BLOOD PRESSURE: 56 MMHG | WEIGHT: 120.59 LBS | RESPIRATION RATE: 16 BRPM | BODY MASS INDEX: 22.19 KG/M2 | HEIGHT: 62 IN

## 2025-02-05 LAB
ANION GAP SERPL CALCULATED.3IONS-SCNC: 11 MMOL/L (ref 5–15)
BUN SERPL-MCNC: 24 MG/DL (ref 8–23)
BUN/CREAT SERPL: 26.4 (ref 7–25)
CALCIUM SPEC-SCNC: 8.5 MG/DL (ref 8.6–10.5)
CHLORIDE SERPL-SCNC: 105 MMOL/L (ref 98–107)
CO2 SERPL-SCNC: 24 MMOL/L (ref 22–29)
CREAT SERPL-MCNC: 0.91 MG/DL (ref 0.57–1)
DEPRECATED RDW RBC AUTO: 49 FL (ref 37–54)
EGFRCR SERPLBLD CKD-EPI 2021: 65.1 ML/MIN/1.73
ERYTHROCYTE [DISTWIDTH] IN BLOOD BY AUTOMATED COUNT: 14.7 % (ref 12.3–15.4)
GLUCOSE BLDC GLUCOMTR-MCNC: 116 MG/DL (ref 70–130)
GLUCOSE BLDC GLUCOMTR-MCNC: 141 MG/DL (ref 70–130)
GLUCOSE BLDC GLUCOMTR-MCNC: 273 MG/DL (ref 70–130)
GLUCOSE SERPL-MCNC: 114 MG/DL (ref 65–99)
HCT VFR BLD AUTO: 25.8 % (ref 34–46.6)
HGB BLD-MCNC: 8.2 G/DL (ref 12–15.9)
MCH RBC QN AUTO: 29.6 PG (ref 26.6–33)
MCHC RBC AUTO-ENTMCNC: 31.8 G/DL (ref 31.5–35.7)
MCV RBC AUTO: 93.1 FL (ref 79–97)
PLATELET # BLD AUTO: 255 10*3/MM3 (ref 140–450)
PMV BLD AUTO: 11 FL (ref 6–12)
POTASSIUM SERPL-SCNC: 4.2 MMOL/L (ref 3.5–5.2)
RBC # BLD AUTO: 2.77 10*6/MM3 (ref 3.77–5.28)
SODIUM SERPL-SCNC: 140 MMOL/L (ref 136–145)
WBC NRBC COR # BLD AUTO: 6.67 10*3/MM3 (ref 3.4–10.8)

## 2025-02-05 PROCEDURE — 99232 SBSQ HOSP IP/OBS MODERATE 35: CPT | Performed by: PHYSICIAN ASSISTANT

## 2025-02-05 PROCEDURE — 80048 BASIC METABOLIC PNL TOTAL CA: CPT | Performed by: PHYSICIAN ASSISTANT

## 2025-02-05 PROCEDURE — 97535 SELF CARE MNGMENT TRAINING: CPT

## 2025-02-05 PROCEDURE — 99231 SBSQ HOSP IP/OBS SF/LOW 25: CPT | Performed by: INTERNAL MEDICINE

## 2025-02-05 PROCEDURE — 63710000001 INSULIN GLARGINE PER 5 UNITS: Performed by: INTERNAL MEDICINE

## 2025-02-05 PROCEDURE — 97530 THERAPEUTIC ACTIVITIES: CPT

## 2025-02-05 PROCEDURE — 71045 X-RAY EXAM CHEST 1 VIEW: CPT

## 2025-02-05 PROCEDURE — 85027 COMPLETE CBC AUTOMATED: CPT | Performed by: PHYSICIAN ASSISTANT

## 2025-02-05 PROCEDURE — 63710000001 INSULIN LISPRO (HUMAN) PER 5 UNITS: Performed by: INTERNAL MEDICINE

## 2025-02-05 PROCEDURE — 82948 REAGENT STRIP/BLOOD GLUCOSE: CPT

## 2025-02-05 RX ORDER — ACETAMINOPHEN 500 MG
500 TABLET ORAL EVERY 8 HOURS
Qty: 11 TABLET | Refills: 0 | Status: SHIPPED | OUTPATIENT
Start: 2025-02-05 | End: 2025-02-09

## 2025-02-05 RX ORDER — ATORVASTATIN CALCIUM 40 MG/1
40 TABLET, FILM COATED ORAL NIGHTLY
Qty: 90 TABLET | Refills: 3 | Status: SHIPPED | OUTPATIENT
Start: 2025-02-05

## 2025-02-05 RX ORDER — ASPIRIN 325 MG
325 TABLET ORAL DAILY
Qty: 30 TABLET | Refills: 6 | Status: SHIPPED | OUTPATIENT
Start: 2025-02-05

## 2025-02-05 RX ORDER — GLIPIZIDE 10 MG/1
20 TABLET ORAL
Status: DISCONTINUED | OUTPATIENT
Start: 2025-02-05 | End: 2025-02-05 | Stop reason: HOSPADM

## 2025-02-05 RX ORDER — METOPROLOL TARTRATE 25 MG/1
12.5 TABLET, FILM COATED ORAL EVERY 12 HOURS SCHEDULED
Qty: 30 TABLET | Refills: 5 | Status: SHIPPED | OUTPATIENT
Start: 2025-02-05

## 2025-02-05 RX ADMIN — EMPAGLIFLOZIN 10 MG: 10 TABLET, FILM COATED ORAL at 08:21

## 2025-02-05 RX ADMIN — METFORMIN HYDROCHLORIDE 1000 MG: 1000 TABLET ORAL at 12:42

## 2025-02-05 RX ADMIN — INSULIN GLARGINE 13 UNITS: 100 INJECTION, SOLUTION SUBCUTANEOUS at 08:22

## 2025-02-05 RX ADMIN — GLIPIZIDE 20 MG: 10 TABLET ORAL at 12:42

## 2025-02-05 RX ADMIN — INSULIN LISPRO 22 UNITS: 100 INJECTION, SOLUTION INTRAVENOUS; SUBCUTANEOUS at 11:59

## 2025-02-05 RX ADMIN — GABAPENTIN 100 MG: 100 CAPSULE ORAL at 08:22

## 2025-02-05 RX ADMIN — SENNOSIDES AND DOCUSATE SODIUM 2 TABLET: 50; 8.6 TABLET ORAL at 08:21

## 2025-02-05 RX ADMIN — INSULIN LISPRO 2 UNITS: 100 INJECTION, SOLUTION INTRAVENOUS; SUBCUTANEOUS at 08:21

## 2025-02-05 RX ADMIN — ACETAMINOPHEN 1000 MG: 500 TABLET, FILM COATED ORAL at 05:31

## 2025-02-05 RX ADMIN — ASPIRIN 325 MG: 325 TABLET ORAL at 08:21

## 2025-02-05 NOTE — PROGRESS NOTES
INTENSIVIST   PROGRESS NOTE        SUBJECTIVE     Abena 79 y.o. female is followed for: No chief complaint on file.       CAD s/p CABG x 5 2025    Hypertension    Hyperlipidemia    Type 2 diabetes mellitus    As an Intensivist, we provide an integrated approach to the ICU patient and family, medical management of comorbid conditions, including but not limited to electrolytes, glycemic control, organ dysfunction, lead interdisciplinary rounds and coordinate the care with all other services, including those from other specialists.     Interval History:  POD: 8 Days Post-Op Procedure(s) (LRB):  MEDIAN STERNOTOMY, CORONARY ARTERY BYPASS GRAFTING X 5, UTILIZING THE LEFT INTERNAL MAMMARY ARTERY, ENDOSCOPIC VEIN HARVESTING OF THE LEFT SAPHENOUS VEIN, TRANSESOPHAGEAL ECHOCARDIOGRAM WITH ANESTHESIA (N/A)     Ready to go home.      Last Bowel Movement: 25 (25 1400)    Temp  Min: 98 °F (36.7 °C)  Max: 99 °F (37.2 °C)       History     Last Reviewed by Akash Land MD on 2/3/2025 at 12:44 PM    Sections Reviewed    Medical, Surgical, Family, Tobacco, Alcohol, Drug Use, Sexual Activity,   Social Documentation    Problem list reviewed by Akash Land MD on 2/3/2025 at 12:44 PM  Medicines reviewed by Akash Land MD on 2/3/2025 at 12:44 PM  Allergies reviewed by Akash Land MD on 2/3/2025 at 12:44 PM       The patient's relevant past medical, surgical and social history were reviewed and updated in Epic as appropriate.        OBJECTIVE     Physical Examination    Vitals:  Temp: 98.4 °F (36.9 °C) (25 0400) Temp  Min: 98 °F (36.7 °C)  Max: 99 °F (37.2 °C)   Temp core:      BP: 138/73 (25 0600) BP  Min: 96/80  Max: 139/61   MAP (non-invasive) Noninvasive MAP (mmHg): 115 (25 0600) Noninvasive MAP (mmHg)  Av  Min: 54  Max: 120   Pulse: 53 (25 0722) Pulse  Min: 50  Max: 69   Resp: 18 (25 0600) Resp  Min: 16  Max: 20   SpO2: 95 % (25 0600) SpO2  Min: 92 %  Max: 99 %    Device: room air (02/05/25 0600)    Flow Rate: 2 (02/03/25 0400) No data recorded     Intake/Ouptut 24 hrs (7:00AM - 6:59 AM)  Intake & Output (last 2 days)         02/03 0701 02/04 0700 02/04 0701 02/05 0700 02/05 0701 02/06 0700    P.O. 1200 240     Total Intake(mL/kg) 1200 (21.9) 240 (4.4)     Urine (mL/kg/hr) 2675 (2) 525 (0.4)     Stool  0     Chest Tube 30      Total Output 2705 525     Net -1505 -285            Urine Unmeasured Occurrence 1 x 3 x     Stool Unmeasured Occurrence  2 x                 01/29/25  0634   Weight: 54.7 kg (120 lb 9.5 oz) (Per Ruthie Dela Cruz RN charting on 1-27-25)      Telemetry:  Rhythm: sinus bradycardia (02/05/25 0600)     QTc Interval (Sec): 0.38 (02/04/25 0800)   Constitutional:  No acute distress.   Cardiovascular: RRR.    Respiratory: Normal breath sounds  No adventitious sounds   Abdominal:  Soft with no tenderness.   Extremities: No Edema   Neurological:   Alert, Oriented, Cooperative.  Best Eye Response: 4-->(E4) spontaneous (02/05/25 0400)  Best Motor Response: 6-->(M6) obeys commands (02/05/25 0400)  Best Verbal Response: 5-->(V5) oriented (02/05/25 0400)  Yana Coma Scale Score: 15 (02/05/25 0400)     Results Reviewed:  Laboratory  Microbiology  Radiology  Pathology    Hematology:  Results from last 7 days   Lab Units 02/05/25 0527 02/04/25 0411 02/03/25 0402   WBC 10*3/mm3 6.67 5.61 6.03   HEMOGLOBIN g/dL 8.2* 7.7* 7.8*   MCV fL 93.1 92.7 93.5   PLATELETS 10*3/mm3 255 253 219     Chemistry:  Estimated Creatinine Clearance: 44.7 mL/min (by C-G formula based on SCr of 0.91 mg/dL).    Results from last 7 days   Lab Units 02/05/25 0527 02/04/25 0411   SODIUM mmol/L 140 138   POTASSIUM mmol/L 4.2 4.5   CHLORIDE mmol/L 105 101   CO2 mmol/L 24.0 27.0   BUN mg/dL 24* 28*   CREATININE mg/dL 0.91 1.08*   GLUCOSE mg/dL 114* 134*     Results from last 7 days   Lab Units 02/05/25  0527 02/04/25  0411 02/02/25  0351 02/01/25  0305 01/31/25  0600   CALCIUM mg/dL 8.5* 8.6    < > 7.9* 8.0*   MAGNESIUM mg/dL  --   --   --  2.2 2.2   PHOSPHORUS mg/dL  --   --   --  2.5 2.3*    < > = values in this interval not displayed.     Images:  XR Chest 1 View    Result Date: 2/5/2025  Impression: 1. Stable tiny left apical pneumothorax measuring 8 mm. 2. Stable small left pleural effusion with mild bibasilar atelectasis. Electronically Signed: David Jacob MD  2/5/2025 7:20 AM EST  Workstation ID: [id number]    XR Chest 1 View    Result Date: 2/4/2025  Impression: 1.Stable tiny residual left apical pneumothorax. 2.Stable right-sided discoid atelectasis. 3.Small left pleural effusion. Electronically Signed: Dusty Zaidi MD  2/4/2025 7:26 AM EST  Workstation ID: [id number]     Echo:  Results for orders placed during the hospital encounter of 01/28/25    Adult Transthoracic Echo Complete W/ Cont if Necessary Per Protocol    Interpretation Summary    Left ventricular systolic function is mildly decreased. Calculated left ventricular EF = 43.7% Left ventricular ejection fraction appears to be 41 - 45%.    Left ventricular diastolic function was indeterminate.    There is calcification of the aortic valve.    Estimated right ventricular systolic pressure from tricuspid regurgitation is normal (<35 mmHg).      Results: Reviewed.  I reviewed the patient's new laboratory and imaging results.  I independently reviewed the patient's new images.    Medications: Reviewed.    Assessment   A/P   Assessment/Management/Treatment Plan:  Hospital:  LOS: 8 days   ICU: 7d 19h     Active Hospital Problems    Diagnosis  POA    **CAD s/p CABG x 5 1/28/2025 [I25.10]  Yes    Hyperlipidemia [E78.5]  Yes    Hypertension [I10]  Yes    Type 2 diabetes mellitus [E11.9]  Yes     Abena is a 79 y.o. female admitted on 1/28/2025 with Coronary artery disease involving native coronary artery of native heart, unspecified whether angina present [I25.10]  CAD (coronary artery disease) [I25.10]    She was  recently seen by cardiology  team with complaints of intermittent/exertional chest pressure.  Echo showed EF of 45 to 50%.  Underwent stress testing which was abnormal and underwent subsequently left heart cath which showed multivessel coronary disease.  Bilateral carotid duplex did not show any significant carotid disease.  She underwent coronary bypass graft surgery x 5 by Dr. Del Castillo on 1/28.      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   HTN  Dyslipidemia   Hematology  NC Anemia  CKD 3B.  Endocrine   Body mass index is 22.06 kg/m². Normal: 18.5-24.9kg/m2  Type 2 diabetes.    Lab Results   Lab Value Date/Time    HGBA1C 8.30 (H) 01/27/2025 1310     Results from last 7 days   Lab Units 02/05/25  0723 02/04/25  2118 02/04/25  1954 02/04/25  1632 02/04/25  1142 02/04/25  0723 02/03/25 2002 02/03/25  1626   GLUCOSE mg/dL 116 153* 199* 188* 229* 166* 217* 184*       Diet: Diet: Cardiac, Diabetic; Healthy Heart (2-3 Na+); Consistent Carbohydrate; Fluid Consistency: Thin (IDDSI 0)   Advance Directives: Code Status and Medical Interventions: CPR (Attempt to Resuscitate); Full Support   Ordered at: 01/28/25 1254     Code Status (Patient has no pulse and is not breathing):    CPR (Attempt to Resuscitate)     Medical Interventions (Patient has pulse or is breathing):    Full Support        VTE Prophylaxis:  Mechanical VTE prophylaxis orders are present.         In brief:  Goal: Glucose < 180 mg/dL.   Resume her usual oral antidiabetic medications in preparation for discharge home.  Disposition: Discharge Home -  as per CT Surgery.    Plan of care and goals reviewed during interdisciplinary rounds.  I discussed the patient's findings and my recommendations with patient and nursing staff    MDM:    Problem(s) High due to: Acute or Chronic illness or injury that may poses a threat to life or bodily function  Data: Low due to: Review of  result(s) of each unique test    Low    [] Primary Attending Intensive Care Medicine - Nutrition Support   [x] Consultant    Copied text in this note has been reviewed and is accurate as of 02/05/25

## 2025-02-05 NOTE — PROGRESS NOTES
CTS Progress Note    POD # 8 s/p CABG x 5     LOS: 8 days     Subjective  No acute events overnight. On room air. Ambulated 220 ft 2x yesterday.    Objective    Vital Signs  Temp:  [97.4 °F (36.3 °C)-99 °F (37.2 °C)] 98.4 °F (36.9 °C)  Heart Rate:  [50-69] 53  Resp:  [16-20] 18  BP: ()/(45-80) 138/73    Physical Exam:   General Appearance: alert, appears stated age and cooperative   Lungs: clear to auscultation     Heart: regular rhythm & normal rate, normal S1, S2 and no murmur, no gallop, no rub   Chest: sternum stable   Skin: Incision c/d/i     Results     Results from last 7 days   Lab Units 02/05/25 0527   WBC 10*3/mm3 6.67   HEMOGLOBIN g/dL 8.2*   HEMATOCRIT % 25.8*   PLATELETS 10*3/mm3 255     Results from last 7 days   Lab Units 02/05/25 0527   SODIUM mmol/L 140   POTASSIUM mmol/L 4.2   CHLORIDE mmol/L 105   CO2 mmol/L 24.0   BUN mg/dL 24*   CREATININE mg/dL 0.91   GLUCOSE mg/dL 114*   CALCIUM mg/dL 8.5*       Imaging Results (Last 24 Hours)       Procedure Component Value Units Date/Time    XR Chest 1 View [[id number]] Collected: 02/05/25 0719     Updated: 02/05/25 0723    Narrative:      XR CHEST 1 VW    Date of Exam: 2/5/2025 2:30 AM EST    Indication: postop    Comparison: 2/4/2025    Findings:  Tiny left apical pneumothorax measuring 8 mm not significantly changed. Status post sternotomy and CABG. Minimal bibasilar atelectasis. Small left pleural effusion stable. No significant effusion on the right. No pneumothorax on the right.      Impression:      Impression:  1. Stable tiny left apical pneumothorax measuring 8 mm.  2. Stable small left pleural effusion with mild bibasilar atelectasis.          Electronically Signed: David Jacob MD    2/5/2025 7:20 AM EST    Workstation ID: [id number]    XR Chest 1 View [[id number]] Collected: 02/04/25 0724     Updated: 02/04/25 0729    Narrative:      XR CHEST 1 VW    Date of Exam: 2/4/2025 1:28 AM EST    Indication: postop    Comparison:  2/3/2025    Findings:  The trachea is midline. Stable cardiomegaly and surgical changes. Stable right-sided discoid atelectasis. Stable tiny residual left apical pneumothorax. Small left pleural effusion. The lungs are otherwise clear      Impression:      Impression:  1.Stable tiny residual left apical pneumothorax.  2.Stable right-sided discoid atelectasis.  3.Small left pleural effusion.        Electronically Signed: Dusty Zaidi MD    2/4/2025 7:26 AM EST    Workstation ID: [id number]          CXR 2/4/25  IMPRESSION:  Impression:  1. Stable tiny left apical pneumothorax measuring 8 mm.  2. Stable small left pleural effusion with mild bibasilar atelectasis.    Assessment  POD # 8 s/p CABG x 5    CAD s/p CABG x 5 1/28/2025    Hypertension    Hyperlipidemia    Type 2 diabetes mellitus    Plan   Monitor H&H-stable  Ambulate TID  Pulmonary toilet  ASA, statin, BB  Discharge home today    Zeferino Del Castillo MD  02/05/25  07:26 EST

## 2025-02-05 NOTE — PLAN OF CARE
Goal Outcome Evaluation:  Plan of Care Reviewed With: patient        Progress: improving  Outcome Evaluation: Pt implemented and educated on use of AE to increase independence with ADLs while maintaining sternal precautions. Pt remains below fxl baseline with ADLs and fxl mobility, warranting further skilled OT services. Recommend d/c to home with assist and HH OT/PT.    Anticipated Discharge Disposition (OT): home with home health, home with assist

## 2025-02-05 NOTE — PROGRESS NOTES
"  [facility]  PROGRESS NOTE    Date of Admission: 1/28/2025  Date of Service: 02/05/25    Primary Care Physician: Eddi Cheatham MD    Chief Complaint: f/u Chronic systolic heart failure, CAD, hypertension, hyperlipidemia   Problem List:   CAD s/p CABG x 5 1/28/2025    Hypertension    Hyperlipidemia    Type 2 diabetes mellitus      Subjective      Sitting up in chair, denies any cardiac complaints. BPs stable.       Objective   Vitals: /57 (BP Location: Right arm, Patient Position: Sitting)   Pulse 62   Temp 98.2 °F (36.8 °C) (Oral)   Resp 18   Ht 157.5 cm (62\") Comment: Per Ruthie Dela Cruz RN charting on 1-27-25  Wt 54.7 kg (120 lb 9.5 oz) Comment: Per Ruthie Dela Cruz RN charting on 1-27-25  SpO2 98%   BMI 22.06 kg/m²     Physical Exam:  GENERAL: Alert, cooperative, in no acute distress.   HEENT: Normocephalic, no jugular venous distention  HEART: Regular rhythm, normal rate, and no murmurs, gallops, or rubs.   LUNGS: No wheezing, rales or rhonchi.  NEUROLOGIC: No focal abnormalities involving strength or sensation are noted.   EXTREMITIES: No clubbing, cyanosis, or edema noted.     Results:  Results from last 7 days   Lab Units 02/05/25 0527 02/04/25 0411 02/03/25  0402   WBC 10*3/mm3 6.67 5.61 6.03   HEMOGLOBIN g/dL 8.2* 7.7* 7.8*   HEMATOCRIT % 25.8* 24.2* 24.6*   PLATELETS 10*3/mm3 255 253 219     Results from last 7 days   Lab Units 02/05/25 0527 02/04/25 0411 02/03/25  0402   SODIUM mmol/L 140 138 138   POTASSIUM mmol/L 4.2 4.5 4.9   CHLORIDE mmol/L 105 101 104   CO2 mmol/L 24.0 27.0 27.0   BUN mg/dL 24* 28* 30*   CREATININE mg/dL 0.91 1.08* 1.02*   GLUCOSE mg/dL 114* 134* 190*      Lab Results   Component Value Date    AST 49 (H) 01/29/2025    ALT 23 01/29/2025         Intake/Output Summary (Last 24 hours) at 2/5/2025 1118  Last data filed at 2/5/2025 1000  Gross per 24 hour   Intake 240 ml   Output 675 ml   Net -435 ml     I personally reviewed the " patient's EKG/Telemetry data    Radiology Data:   XR Chest 1 View    Result Date: 2/5/2025  Impression: 1. Stable tiny left apical pneumothorax measuring 8 mm. 2. Stable small left pleural effusion with mild bibasilar atelectasis. Electronically Signed: David Jacob MD  2/5/2025 7:20 AM EST  Workstation ID: [id number]    XR Chest 1 View    Result Date: 2/4/2025  Impression: 1.Stable tiny residual left apical pneumothorax. 2.Stable right-sided discoid atelectasis. 3.Small left pleural effusion. Electronically Signed: Dusty Zaidi MD  2/4/2025 7:26 AM EST  Workstation ID: [id number]       Current Medications:  acetaminophen, 1,000 mg, Oral, Q8H  aspirin, 325 mg, Oral, Daily  atorvastatin, 40 mg, Oral, Nightly  empagliflozin, 10 mg, Oral, Daily  gabapentin, 100 mg, Oral, TID  glipizide, 20 mg, Oral, BID AC  insulin lispro, 1-200 Units, Subcutaneous, 4x Daily With Meals & Nightly  metFORMIN, 1,000 mg, Oral, BID With Meals  metoprolol tartrate, 12.5 mg, Oral, Q12H  pharmacy consult - MTM, , Not Applicable, Daily  Scopolamine, 1 patch, Transdermal, Q72H  senna-docusate sodium, 2 tablet, Oral, BID         Initial cardiac assessment: 79-year-old female found to have multivessel CAD at cath in [facility] went to Dr. Del Castillo for second opinion, status post 5V CABG 1/28/2025, postop EF 38%.     ASSESSMENT/PLAN:  1.  CAD:  Status post 5V CABG  Continue aspirin and statin  Continue metoprolol low-dose  Doing well off midodrine now, BPs stable     2.  Acute on chronic systolic heart failure, EF 40-45%:  Fairly euvolemic currently, EF on EMRE immediately postop 38%  Echo 1/31/25 EF 40-45%, with normal valves  Started Jardiance 10 mg daily 2/1/2025, tolerating well  Continue beta-blocker. Hold off on resuming home Entresto due to hypotension. Discussed with patient home monitoring of her BP and when to call or resume Entresto      3.  CKD stage III:  No evidence of EDGARDO, Cr today normal with normal eGFR  Continue SGLT2 inhibitor      4.  Hyperlipidemia:  Goal LDL less than 70, continue atorvastatin 40 mg daily     OK for discharge from cardiac standpoint. She wishes to follow up with her regular cardiologist in [place], Dr. Ventura      Electronically signed by Madeleine Sharif PA-C, 02/05/25, 11:21 AM EST.

## 2025-02-05 NOTE — PLAN OF CARE
Goal Outcome Evaluation:  Plan of Care Reviewed With: patient        Progress: improving  Outcome Evaluation: Pt ambulated 220ft with CGA and B UE support on tripod monitor. Progressed to periods of SBA. Pt demonstrated improved posture with good balance and stability. Declined additional ambulation despite encouragement. Continue to recommend PT skilled care and D/C home with assistance and  PT services.    Anticipated Discharge Disposition (PT): home with assist, home with home health

## 2025-02-05 NOTE — PROGRESS NOTES
Patient admitted for CABG. [facility] discharge counseling and medication calendar provided on 2/5/2025. Information provided includes indication for medication, drug-drug interactions, possible side effects, and importance of compliance. Patient verbalized understanding through teach back. All pertinent questions were answered.     CABG  [x] ASA  [x] Statin  [x] BB    Jardiance continued - PTA med.    Thank you,  Bryce Hammond, PharmD  02/05/25

## 2025-02-05 NOTE — PLAN OF CARE
Goal Outcome Evaluation:   Alert and oriented x4. Up in chair and ambulated in hallway 220 ft with rolling walker. Steady gait. No signs of distress after ambulation.  Remains on room air. Up to bedside commode x2 unmeasured . Pm metoprolol given,heart rate remained in 50's , Sinus Oneil. B/p remained stable. Afebrile. Plans to d/c home today  to home health per case management. No problems reported.

## 2025-02-05 NOTE — TELEPHONE ENCOUNTER
Name: Abena Washington    Relationship: Self    Best Callback Number: [phone] (home)      Incoming call to the Hub, requesting to  Cancel their HFU appointment on 3-20-25.     Per Hub workflow, further review is needed before the task can be completed.    Result of Call: Please cancel this appointment    PER HOSPITAL FLOOR NURSE WHO MADE THE APPT.

## 2025-02-05 NOTE — THERAPY TREATMENT NOTE
Patient Name: Abena Washington  : 1945    MRN: [id number]                              Today's Date: 2025       Admit Date: 2025    Visit Dx:     ICD-10-CM ICD-9-CM   1. Coronary artery disease involving native coronary artery of native heart with angina pectoris  I25.119 414.01     413.9   2. Coronary artery disease involving native coronary artery of native heart, unspecified whether angina present  I25.10 414.01     Patient Active Problem List   Diagnosis    CAD s/p CABG x 5 2025    Hypertension    Hyperlipidemia    Type 2 diabetes mellitus     Past Medical History:   Diagnosis Date    Anemia     Anxiety and depression     Arthritis     Cataracts, bilateral     Coronary artery disease     Delayed emergence from anesthesia     Diabetes mellitus     Heart attack     Hyperlipidemia     Hypertension     Nausea     Peripheral neuropathy     PONV (postoperative nausea and vomiting)     Skin melanoma     Wears glasses      Past Surgical History:   Procedure Laterality Date    CARDIAC CATHETERIZATION      CATARACT EXTRACTION W/ INTRAOCULAR LENS IMPLANT Left 2023    Procedure: CATARACT PHACO EXTRACTION WITH INTRAOCULAR LENS IMPLANT LEFT;  Surgeon: Mina Hendrickson MD;  Location: [facility] OR;  Service: Ophthalmology;  Laterality: Left;    CATARACT EXTRACTION W/ INTRAOCULAR LENS IMPLANT Right 2023    Procedure: CATARACT PHACO EXTRACTION WITH INTRAOCULAR LENS IMPLANT RIGHT;  Surgeon: Mina Hendrickson MD;  Location: [facility] OR;  Service: Ophthalmology;  Laterality: Right;    CHOLECYSTECTOMY      COLONOSCOPY      CORONARY ARTERY BYPASS GRAFT N/A 2025    Procedure: MEDIAN STERNOTOMY, CORONARY ARTERY BYPASS GRAFTING X 5, UTILIZING THE LEFT INTERNAL MAMMARY ARTERY, ENDOSCOPIC VEIN HARVESTING OF THE LEFT SAPHENOUS VEIN, TRANSESOPHAGEAL ECHOCARDIOGRAM WITH ANESTHESIA;  Surgeon: Zeferino Del Castillo MD;  Location: [facility] OR;  Service: Cardiothoracic;  Laterality: N/A;    ENDOSCOPY       HYSTERECTOMY      SKIN CANCER EXCISION      TUBAL ABDOMINAL LIGATION        General Information       Row Name 02/05/25 0924          OT Time and Intention    Document Type therapy note (daily note)  -AJ     Mode of Treatment occupational therapy  -AJ     Patient Effort excellent  -       Row Name 02/05/25 0924          General Information    Patient Profile Reviewed yes  -AJ     Existing Precautions/Restrictions cardiac;fall;sternal  -AJ     Barriers to Rehab medically complex  -       Row Name 02/05/25 0924          Safety Issues/Impairments Affecting Functional Mobility    Impairments Affecting Function (Mobility) balance;endurance/activity tolerance;postural/trunk control;strength;coordination  -               User Key  (r) = Recorded By, (t) = Taken By, (c) = Cosigned By      Initials Name Provider Type    AJ Rosana Lemus OT Occupational Therapist                     Mobility/ADL's       Row Name 02/05/25 0925          Bed Mobility    Comment, (Bed Mobility) UIC  -       Row Name 02/05/25 0925          Transfers    Transfers sit-stand transfer;stand-sit transfer  -       Row Name 02/05/25 0925          Sit-Stand Transfer    Sit-Stand [place] (Transfers) contact guard;verbal cues  -     Comment, (Sit-Stand Transfer) Cues for sequencing and maintaining sternal precautions.  -       Row Name 02/05/25 0925          Stand-Sit Transfer    Stand-Sit [place] (Transfers) contact guard  -       Row Name 02/05/25 0925          Activities of Daily Living    BADL Assessment/Intervention toileting;lower body dressing;bathing  -       Row Name 02/05/25 0925          Toileting Assessment/Training    [place] Level (Toileting) perform perineal hygiene  -     Assistive Devices (Toileting) toilet paper aid  -AJ     Position (Toileting) supported standing  -AJ     Comment, (Toileting) Pt implemented and educated on the use of toilet paper aid for increased independence with toileting while  maintaining sternal precautions.  -       Row Name 02/05/25 0925          Lower Body Dressing Assessment/Training    [place] Level (Lower Body Dressing) don;doff;socks;verbal cues  -     Assistive Devices (Lower Body Dressing) long-handled shoe horn  -     Position (Lower Body Dressing) supported sitting  -AJ     Comment, (Lower Body Dressing) Pt implemented and educated on use of long handled shoe horn for donning shoes while maintaining sternal precautions. Able to elia/doff socks using figure four with verbal cues.  -       Row Name 02/05/25 0925          Bathing Assessment/Intervention    [place] Level (Bathing) bathing skills  -     Assistive Devices (Bathing) long-handled sponge  -     Comment, (Bathing) Pt implemented and educated on LH sponge to increase independence with bathing while maintaining sternal precautions.  -               User Key  (r) = Recorded By, (t) = Taken By, (c) = Cosigned By      Initials Name Provider Type    Rosana Lee OT Occupational Therapist                   Obj/Interventions       Row Name 02/05/25 0939          Balance    Balance Assessment sitting static balance;sitting dynamic balance;sit to stand dynamic balance;standing static balance;standing dynamic balance  -     Static Sitting Balance standby assist  -     Dynamic Sitting Balance standby assist  -AJ     Position, Sitting Balance unsupported;sitting in chair  -     Static Standing Balance contact guard  -     Dynamic Standing Balance contact guard  -     Position/Device Used, Standing Balance unsupported  -     Balance Interventions sitting;standing;sit to stand;supported;static;dynamic;occupation based/functional task  -               User Key  (r) = Recorded By, (t) = Taken By, (c) = Cosigned By      Initials Name Provider Type    Rosana Lee OT Occupational Therapist                   Goals/Plan    No documentation.                  Clinical Impression       Row  Name 02/05/25 0940          Pain Assessment    Pretreatment Pain Rating 0/10 - no pain  -AJ     Posttreatment Pain Rating 0/10 - no pain  -AJ       Row Name 02/05/25 0940          Plan of Care Review    Plan of Care Reviewed With patient  -AJ     Progress improving  -     Outcome Evaluation Pt implemented and educated on use of AE to increase independence with ADLs while maintaining sternal precautions. Pt remains below fxl baseline with ADLs and fxl mobility, warranting further skilled OT services. Recommend d/c to home with assist and HH OT/PT.  -       Row Name 02/05/25 0940          Therapy Plan Review/Discharge Plan (OT)    Anticipated Discharge Disposition (OT) home with home health;home with assist  -       Row Name 02/05/25 0940          Vital Signs    Pre Systolic BP Rehab 119  -AJ     Pre Treatment Diastolic BP 62  -AJ     Post Systolic BP Rehab 137  -AJ     Post Treatment Diastolic BP 63  -AJ     Pretreatment Heart Rate (beats/min) 56  -AJ     Posttreatment Heart Rate (beats/min) 55  -AJ     Pre SpO2 (%) 97  -AJ     O2 Delivery Pre Treatment room air  -AJ     Post SpO2 (%) 96  -AJ     O2 Delivery Post Treatment room air  -AJ     Pre Patient Position Sitting  -AJ     Intra Patient Position Standing  -AJ     Post Patient Position Sitting  -AJ       Row Name 02/05/25 0940          Positioning and Restraints    Pre-Treatment Position in bed  -AJ     Post Treatment Position chair  -AJ     In Chair notified nsg;reclined;sitting;call light within reach;encouraged to call for assist;legs elevated;RUE elevated;LUE elevated  -AJ               User Key  (r) = Recorded By, (t) = Taken By, (c) = Cosigned By      Initials Name Provider Type    Rosana Lee OT Occupational Therapist                   Outcome Measures       Row Name 02/05/25 0955          How much help from another is currently needed...    Putting on and taking off regular lower body clothing? 3  -AJ     Bathing (including washing, rinsing,  and drying) 3  -AJ     Toileting (which includes using toilet bed pan or urinal) 3  -AJ     Putting on and taking off regular upper body clothing 3  -AJ     Taking care of personal grooming (such as brushing teeth) 3  -AJ     Eating meals 4  -AJ     [facility] 6 Clicks Score (OT) 19  -AJ       Row Name 02/05/25 0800          How much help from another person do you currently need...    Turning from your back to your side while in flat bed without using bedrails? 3  -KR     Moving from lying on back to sitting on the side of a flat bed without bedrails? 3  -KR     Moving to and from a bed to a chair (including a wheelchair)? 3  -KR     Standing up from a chair using your arms (e.g., wheelchair, bedside chair)? 3  -KR     Climbing 3-5 steps with a railing? 3  -KR     To walk in hospital room? 3  -KR     [facility] 6 Clicks Score (PT) 18  -KR     Highest Level of Mobility Goal 6 --> Walk 10 steps or more  -KR       Row Name 02/05/25 0948          Functional Assessment    Outcome Measure Options [facility] 6 Clicks Daily Activity (OT)  -               User Key  (r) = Recorded By, (t) = Taken By, (c) = Cosigned By      Initials Name Provider Type    Stephanie Reid, RN Registered Nurse    Rosana Lee OT Occupational Therapist                    Occupational Therapy Education       Title: PT OT SLP Therapies (In Progress)       Topic: Occupational Therapy (In Progress)       Point: ADL training (Done)       Description:   Instruct learner(s) on proper safety adaptation and remediation techniques during self care or transfers.   Instruct in proper use of assistive devices.                  Learning Progress Summary            Patient Acceptance, E,D, VU,DU by ABEL at 2/5/2025 0949    Acceptance, E, NR by RHIANNON at 1/31/2025 0905                      Point: Home exercise program (In Progress)       Description:   Instruct learner(s) on appropriate technique for monitoring, assisting and/or progressing therapeutic  exercises/activities.                  Learning Progress Summary            Patient Acceptance, E, NR by LR at 1/31/2025 0905                      Point: Precautions (Done)       Description:   Instruct learner(s) on prescribed precautions during self-care and functional transfers.                  Learning Progress Summary            Patient Acceptance, E,D, VU,DU by AJ at 2/5/2025 0949    Acceptance, E, NR by LR at 1/31/2025 0905                      Point: Body mechanics (Done)       Description:   Instruct learner(s) on proper positioning and spine alignment during self-care, functional mobility activities and/or exercises.                  Learning Progress Summary            Patient Acceptance, E,D, VU,DU by AJ at 2/5/2025 0949    Acceptance, E, NR by LR at 1/31/2025 0905                                      User Key       Initials Effective Dates Name Provider Type Discipline     10/09/24 -  Jossie Borrero, OT Occupational Therapist OT     08/26/24 -  Rosana Lemus OT Occupational Therapist OT                  OT Recommendation and Plan     Plan of Care Review  Plan of Care Reviewed With: patient  Progress: improving  Outcome Evaluation: Pt implemented and educated on use of AE to increase independence with ADLs while maintaining sternal precautions. Pt remains below fxl baseline with ADLs and fxl mobility, warranting further skilled OT services. Recommend d/c to home with assist and HH OT/PT.     Time Calculation:         Time Calculation- OT       Row Name 02/05/25 0951             Time Calculation- OT    OT Start Time 0900  -      OT Received On 02/05/25  -      OT Goal Re-Cert Due Date 02/10/25  -         Timed Charges    15698 - OT Therapeutic Activity Minutes 10  -AJ      67756 - OT Self Care/Mgmt Minutes 13  -AJ         Total Minutes    Timed Charges Total Minutes 23  -       Total Minutes 23  -AJ                User Key  (r) = Recorded By, (t) = Taken By, (c) = Cosigned By       Initials Name Provider Type     Rosana Lemus OT Occupational Therapist                  Therapy Charges for Today       Code Description Service Date Service Provider Modifiers Qty    82081646551  OT THERAPEUTIC ACT EA 15 MIN 2/5/2025 Rosana Lemus OT GO 1    [id number]  OT SELF CARE/MGMT/TRAIN EA 15 MIN 2/5/2025 Rosana Lemus OT GO 1                 Rosana Lemus OT  2/5/2025

## 2025-02-05 NOTE — THERAPY TREATMENT NOTE
Patient Name: Abena Washington  : 1945    MRN: [id number]                              Today's Date: 2025       Admit Date: 2025    Visit Dx:     ICD-10-CM ICD-9-CM   1. Coronary artery disease involving native coronary artery of native heart with angina pectoris  I25.119 414.01     413.9   2. Coronary artery disease involving native coronary artery of native heart, unspecified whether angina present  I25.10 414.01     Patient Active Problem List   Diagnosis    CAD s/p CABG x 5 2025    Hypertension    Hyperlipidemia    Type 2 diabetes mellitus     Past Medical History:   Diagnosis Date    Anemia     Anxiety and depression     Arthritis     Cataracts, bilateral     Coronary artery disease     Delayed emergence from anesthesia     Diabetes mellitus     Heart attack     Hyperlipidemia     Hypertension     Nausea     Peripheral neuropathy     PONV (postoperative nausea and vomiting)     Skin melanoma     Wears glasses      Past Surgical History:   Procedure Laterality Date    CARDIAC CATHETERIZATION      CATARACT EXTRACTION W/ INTRAOCULAR LENS IMPLANT Left 2023    Procedure: CATARACT PHACO EXTRACTION WITH INTRAOCULAR LENS IMPLANT LEFT;  Surgeon: Mina Hendrickson MD;  Location: [facility] OR;  Service: Ophthalmology;  Laterality: Left;    CATARACT EXTRACTION W/ INTRAOCULAR LENS IMPLANT Right 2023    Procedure: CATARACT PHACO EXTRACTION WITH INTRAOCULAR LENS IMPLANT RIGHT;  Surgeon: Mina Hendrickson MD;  Location: [facility] OR;  Service: Ophthalmology;  Laterality: Right;    CHOLECYSTECTOMY      COLONOSCOPY      CORONARY ARTERY BYPASS GRAFT N/A 2025    Procedure: MEDIAN STERNOTOMY, CORONARY ARTERY BYPASS GRAFTING X 5, UTILIZING THE LEFT INTERNAL MAMMARY ARTERY, ENDOSCOPIC VEIN HARVESTING OF THE LEFT SAPHENOUS VEIN, TRANSESOPHAGEAL ECHOCARDIOGRAM WITH ANESTHESIA;  Surgeon: Zeferino Del Castillo MD;  Location: [facility] OR;  Service: Cardiothoracic;  Laterality: N/A;    ENDOSCOPY       HYSTERECTOMY      SKIN CANCER EXCISION      TUBAL ABDOMINAL LIGATION        General Information       Row Name 02/05/25 1428          Physical Therapy Time and Intention    Document Type therapy note (daily note)  -KG     Mode of Treatment physical therapy  -KG       Row Name 02/05/25 1428          General Information    Existing Precautions/Restrictions cardiac;fall;sternal  -KG       Row Name 02/05/25 1428          Cognition    Orientation Status (Cognition) oriented x 3  -KG       Row Name 02/05/25 1428          Safety Issues/Impairments Affecting Functional Mobility    Safety Issues Affecting Function (Mobility) awareness of need for assistance;insight into deficits/self-awareness;safety precaution awareness;safety precautions follow-through/compliance  -KG     Impairments Affecting Function (Mobility) balance;endurance/activity tolerance;postural/trunk control;strength  -KG               User Key  (r) = Recorded By, (t) = Taken By, (c) = Cosigned By      Initials Name Provider Type    KG Patrica Rodriguez, PT Physical Therapist                   Mobility       Row Name 02/05/25 1428          Bed Mobility    Comment, (Bed Mobility) UIC  -KG       Row Name 02/05/25 1428          Transfers    Comment, (Transfers) VC's for sequencing and safe hand placement to maintain sternal precautions. Toilet transfer to [facility] with CGA.  -KG       Row Name 02/05/25 1428          Sit-Stand Transfer    Sit-Stand [place] (Transfers) contact guard;verbal cues  -KG       Row Name 02/05/25 1428          Gait/Stairs (Locomotion)    [place] Level (Gait) contact guard;verbal cues  progressed to SBA  -KG     Assistive Device (Gait) other (see comments)  B UE support on tripod monitor  -KG     Distance in Feet (Gait) 220  -KG     Deviations/Abnormal Patterns (Gait) base of support, narrow;jad decreased;stride length decreased  -KG     Bilateral Gait Deviations forward flexed posture;heel strike decreased  -KG     Comment,  (Gait/Stairs) Pt demonstrated step through gait pattern with slow jad and decreased step length. Pt demonstrated improved posture with good balance and stability. Pt able to progress to SBA. Declined additional ambulation despite encouragement.  -KG               User Key  (r) = Recorded By, (t) = Taken By, (c) = Cosigned By      Initials Name Provider Type    KG Patrica Rodriguez, PT Physical Therapist                   Obj/Interventions       Row Name 02/05/25 1429          Balance    Balance Assessment sitting static balance;standing static balance;standing dynamic balance  -KG     Static Sitting Balance supervision  -KG     Position, Sitting Balance unsupported;sitting in chair  -KG     Static Standing Balance standby assist  -KG     Dynamic Standing Balance contact guard  -KG     Position/Device Used, Standing Balance supported  -KG               User Key  (r) = Recorded By, (t) = Taken By, (c) = Cosigned By      Initials Name Provider Type    KG Patrica Rodriguez, PT Physical Therapist                   Goals/Plan    No documentation.                  Clinical Impression       Row Name 02/05/25 1430          Pain    Pretreatment Pain Rating 0/10 - no pain  -KG     Posttreatment Pain Rating 0/10 - no pain  -KG       Row Name 02/05/25 1430          Plan of Care Review    Plan of Care Reviewed With patient  -KG     Progress improving  -KG     Outcome Evaluation Pt ambulated 220ft with CGA and B UE support on tripod monitor. Progressed to periods of SBA. Pt demonstrated improved posture with good balance and stability. Declined additional ambulation despite encouragement. Continue to recommend PT skilled care and D/C home with assistance and  PT services.  -KG       Row Name 02/05/25 1430          Vital Signs    Pre Systolic BP Rehab 121  -KG     Pre Treatment Diastolic BP 57  -KG     Pretreatment Heart Rate (beats/min) 64  -KG     Posttreatment Heart Rate (beats/min) 60  -KG     Pre SpO2 (%) 98  -KG      O2 Delivery Pre Treatment room air  -KG     Post SpO2 (%) 97  -KG     O2 Delivery Post Treatment room air  -KG     Pre Patient Position Sitting  -KG     Intra Patient Position Standing  -KG     Post Patient Position Sitting  -KG       Row Name 02/05/25 1430          Positioning and Restraints    Pre-Treatment Position sitting in chair/recliner  -KG     Post Treatment Position bsc  -KG     On BS commode notified nsg;sitting;call light within reach;encouraged to call for assist  -KG               User Key  (r) = Recorded By, (t) = Taken By, (c) = Cosigned By      Initials Name Provider Type    Patrica Piña PT Physical Therapist                   Outcome Measures       Row Name 02/05/25 1431 02/05/25 0800       How much help from another person do you currently need...    Turning from your back to your side while in flat bed without using bedrails? 3  -KG 3  -KR    Moving from lying on back to sitting on the side of a flat bed without bedrails? 3  -KG 3  -KR    Moving to and from a bed to a chair (including a wheelchair)? 3  -KG 3  -KR    Standing up from a chair using your arms (e.g., wheelchair, bedside chair)? 3  -KG 3  -KR    Climbing 3-5 steps with a railing? 3  -KG 3  -KR    To walk in hospital room? 3  -KG 3  -KR    AM-PAC 6 Clicks Score (PT) 18  -KG 18  -KR    Highest Level of Mobility Goal 6 --> Walk 10 steps or more  -KG 6 --> Walk 10 steps or more  -KR      Row Name 02/05/25 1431 02/05/25 0948       Functional Assessment    Outcome Measure Options AM-PAC 6 Clicks Basic Mobility (PT)  -KG AM-PAC 6 Clicks Daily Activity (OT)  -AJ              User Key  (r) = Recorded By, (t) = Taken By, (c) = Cosigned By      Initials Name Provider Type    Patrica Piña, PT Physical Therapist    Stephanie Reid, RN Registered Nurse    Rosana Lee, ADDI Occupational Therapist                                 Physical Therapy Education       Title: PT OT SLP Therapies (In Progress)        Topic: Physical Therapy (Done)       Point: Mobility training (Done)       Learning Progress Summary            Patient Acceptance, E, VU,NR by KG at 2/5/2025 1006    Acceptance, E, VU,NR by KG at 2/4/2025 0849    Acceptance, E, VU,NR by KG at 2/3/2025 1009    Acceptance, E, NR by AE at 2/1/2025 0813    Acceptance, E, NR by KG at 1/31/2025 0901    Acceptance, E, NR by KG at 1/30/2025 1449                      Point: Home exercise program (Done)       Learning Progress Summary            Patient Acceptance, E, VU,NR by KG at 2/5/2025 1006    Acceptance, E, VU,NR by KG at 2/4/2025 0849    Acceptance, E, VU,NR by KG at 2/3/2025 1009    Acceptance, E, NR by AE at 2/1/2025 0813    Acceptance, E, NR by KG at 1/31/2025 0901                      Point: Body mechanics (Done)       Learning Progress Summary            Patient Acceptance, E, VU,NR by KG at 2/5/2025 1006    Acceptance, E, VU,NR by KG at 2/4/2025 0849    Acceptance, E, VU,NR by KG at 2/3/2025 1009    Acceptance, E, NR by AE at 2/1/2025 0813    Acceptance, E, NR by KG at 1/31/2025 0901    Acceptance, E, NR by KG at 1/30/2025 1449                      Point: Precautions (Done)       Learning Progress Summary            Patient Acceptance, E, VU,NR by KG at 2/5/2025 1006    Acceptance, E, VU,NR by KG at 2/4/2025 0849    Acceptance, E, VU,NR by KG at 2/3/2025 1009    Acceptance, E, NR by AE at 2/1/2025 0813    Acceptance, E, NR by KG at 1/31/2025 0901    Acceptance, E, NR by KG at 1/30/2025 1449                                      User Key       Initials Effective Dates Name Provider Type Discipline    KG 05/22/20 -  Patrica Rodriguez, PT Physical Therapist PT    AE 09/21/21 -  rByce Castillo PT Physical Therapist PT                  PT Recommendation and Plan  Planned Therapy Interventions (PT): balance training, bed mobility training, gait training, strengthening, transfer training  Progress: improving  Outcome Evaluation: Pt ambulated 220ft with CGA and B  UE support on tripod monitor. Progressed to periods of SBA. Pt demonstrated improved posture with good balance and stability. Declined additional ambulation despite encouragement. Continue to recommend PT skilled care and D/C home with assistance and  PT services.     Time Calculation:   PT Evaluation Complexity  History, PT Evaluation Complexity: 3 or more personal factors and/or comorbidities  Examination of Body Systems (PT Eval Complexity): total of 3 or more elements  Clinical Presentation (PT Evaluation Complexity): evolving  Clinical Decision Making (PT Evaluation Complexity): moderate complexity  Overall Complexity (PT Evaluation Complexity): moderate complexity     PT Charges       Row Name 02/05/25 1006             Time Calculation    Start Time 1006  -KG      PT Received On 02/05/25  -KG      PT Goal Re-Cert Due Date 02/09/25  -KG         Time Calculation- PT    Total Timed Code Minutes- PT 14 minute(s)  -KG         Timed Charges    72386 - PT Therapeutic Activity Minutes 14  -KG         Total Minutes    Timed Charges Total Minutes 14  -KG       Total Minutes 14  -KG                User Key  (r) = Recorded By, (t) = Taken By, (c) = Cosigned By      Initials Name Provider Type    KG Patrica Rodriguez, PT Physical Therapist                  Therapy Charges for Today       Code Description Service Date Service Provider Modifiers Qty    86205251312 HC PT THERAPEUTIC ACT EA 15 MIN 2/4/2025 Patrica Rodriguez, PT GP 2    89100728733 HC PT THERAPEUTIC ACT EA 15 MIN 2/5/2025 Patrica Rodriguez, PT GP 1            PT G-Codes  Outcome Measure Options: AM-PAC 6 Clicks Basic Mobility (PT)  AM-PAC 6 Clicks Score (PT): 18  AM-PAC 6 Clicks Score (OT): 19  PT Discharge Summary  Anticipated Discharge Disposition (PT): home with assist, home with home health    Erica Rodriguez PT  2/5/2025

## 2025-02-06 NOTE — OUTREACH NOTE
Prep Survey      Flowsheet Row Responses   [facility] facility patient discharged from? [place]   Is LACE score < 7 ? No   Eligibility Readm Mgmt   Discharge diagnosis CABGx5   Does the patient have one of the following disease processes/diagnoses(primary or secondary)? Cardiothoracic surgery   Does the patient have Home health ordered? Yes   [facility]    Is there a DME ordered? No   Prep survey completed? Yes            WILLIAM ALVAREZ - Registered Nurse

## 2025-02-11 ENCOUNTER — READMISSION MANAGEMENT (OUTPATIENT)
Dept: CALL CENTER | Facility: HOSPITAL | Age: 80
End: 2025-02-11
Payer: MEDICARE

## 2025-02-11 NOTE — OUTREACH NOTE
CT Surgery Week 1 Survey      Flowsheet Row Responses   [facility] patient discharged from? [place]   Does the patient have one of the following disease processes/diagnoses(primary or secondary)? Cardiothoracic surgery   Week 1 attempt successful? Yes   Call start time 1348   Call end time 1352   Discharge diagnosis CABGx5   Meds reviewed with patient/caregiver? Yes   Is the patient having any side effects they believe may be caused by any medication additions or changes? No   Does the patient have all medications related to this admission filled (includes all antibiotics, pain medications, cardiac medications, etc.) Yes   Is the patient taking all medications as directed (includes completed medication regime)? Yes   Does the patient have a primary care provider?  Yes   Does the patient have an appointment scheduled with their C/T surgeon? Yes   Comments regarding PCP PCP follow up 2/14/25. CTS follow up 3/4/25   Has the patient kept scheduled appointments due by today? N/A   What is the Home health agency?  [facility]    Has home health visited the patient within 72 hours of discharge? No   Home health comments Patient reports  has reached out to her but she has not allowed them to start visits.   Psychosocial issues? No   Did the patient receive a copy of their discharge instructions? Yes   Nursing interventions Reviewed instructions with patient   What is the patient's perception of their health status since discharge? Improving   Nursing interventions Nurse provided patient education   Is the patient/caregiver able to teach back normal signs of recovery? Constipation, Pain or discomfort at incisional site, Nausea and lack of appetite   Nursing interventions Reassured on normal signs of recovery   Is the patient /caregiver able to teach back basic post-op care? Continue use of incentive spirometry at least 1 week post discharge, Practice cough and deep breath every 4 hours while awake, Hold pillow to  support chest when coughing, No tub bath, swimming, or hot tub until instructed by MD, Use a clean wash cloth and antibacterial bar or liquid soap to clean incisions, Lifting as instructed by MD in discharge instructions   Is the patient/caregiver able to teach back signs and symptoms of incisional infection? Increased redness, swelling or pain at the incisonal site, Increased drainage or bleeding, Incisional warmth, Pus or odor from incision, Fever   Is the patient/caregiver able to teach back steps to recovery at home? Set small, achievable goals for return to baseline health, Rest and rebuild strength, gradually increase activity, Eat a well-balance diet   Is the patient /caregiver able to teach back the importance of cardiac rehab? Yes   Nursing interventions Provided education on importance of cardiac rehab   If the patient is a current smoker, are they able to teach back resources for cessation? Not a smoker   Is the patient/caregiver able to teach back the hierarchy of who to call/visit for symptoms/problems? PCP, Specialist, Home health nurse, Urgent Care, ED, 911 Yes   Week 1 call completed? Yes   Is the patient interested in additional calls from an ambulatory ? No   Would this patient benefit from a Referral to [facility] Social Work? No   Call end time [phone]              Genna PRO - Registered Nurse

## 2025-02-12 NOTE — DISCHARGE SUMMARY
CTS Discharge Summary    Patient Care Team:  Eddi Cheatham MD as PCP - General (Internal Medicine)  Consults:   Consults       Date and Time Order Name Status Description    1/28/2025 12:54 PM Inpatient Consult to Cardiology Completed             Date of Admission: 1/28/2025  5:06 AM  Date of Discharge:  2/5/2025    Discharge Diagnosis  Past Medical History:   Diagnosis Date    Anemia     Anxiety and depression     Arthritis     Cataracts, bilateral     Coronary artery disease     Delayed emergence from anesthesia     Diabetes mellitus     Heart attack     Hyperlipidemia     Hypertension     Nausea     Peripheral neuropathy     PONV (postoperative nausea and vomiting)     Skin melanoma     Wears glasses      Patient Active Problem List   Diagnosis    CAD s/p CABG x 5 1/28/2025    Hypertension    Hyperlipidemia    Type 2 diabetes mellitus       Coronary artery disease involving native coronary artery of native heart, unspecified whether angina present [I25.10]  CAD (coronary artery disease) [I25.10]     Procedures Performed  Procedure(s):  MEDIAN STERNOTOMY, CORONARY ARTERY BYPASS GRAFTING X 5, UTILIZING THE LEFT INTERNAL MAMMARY ARTERY, ENDOSCOPIC VEIN HARVESTING OF THE LEFT SAPHENOUS VEIN, TRANSESOPHAGEAL ECHOCARDIOGRAM WITH ANESTHESIA       History of Present Illness  Patient is a 79 y.o. female with a history of hypertension, hyperlipidemia, poorly controlled diabetes mellitus and family history of coronary artery disease who presented with unstable angina.  She was found to have multivessel coronary artery disease and was felt to warrant surgical revascularization.       Hospital Course  Patient was taken to the operating room on 1/28/25 for coronary artery bypass grafting x5 with endoscopic vein harvest.  Patient was transported to cardiac ICU intubated and in stable condition.   POD 1: Weaning levophed  POD 2: Mediastinal CT, pacing wires and femoral arterial line removed.   POD 3: Diuresis, davis and central  line removed  POD 6: Pleural chest tubes removed, diuresis, awaiting telemetry bed  POD 8: Patient met discharge criteria and was discharged to home      Discharge Medications     Discharge Medications        New Medications        Instructions Start Date   aspirin 325 MG tablet  Replaces: aspirin 81 MG EC tablet   325 mg, Oral, Daily      metoprolol tartrate 25 MG tablet  Commonly known as: LOPRESSOR   Take 0.5 tablet by mouth Every 12 (Twelve) Hours.             Changes to Medications        Instructions Start Date   atorvastatin 40 MG tablet  Commonly known as: LIPITOR  What changed:   how to take this  when to take this   40 mg, Oral, Nightly      Jardiance 10 MG tablet tablet  Generic drug: empagliflozin  What changed: how much to take   10 mg, Oral, Daily             Continue These Medications        Instructions Start Date   alendronate 70 MG tablet  Commonly known as: FOSAMAX   70 mg, Every 7 Days      fluticasone 50 MCG/ACT nasal spray  Commonly known as: FLONASE   2 sprays, Daily      gabapentin 300 MG capsule  Commonly known as: NEURONTIN   300 mg, 2 Times Daily PRN      glipizide 10 MG tablet  Commonly known as: GLUCOTROL   20 mg, 2 Times Daily Before Meals      metFORMIN 1000 MG tablet  Commonly known as: GLUCOPHAGE   1,000 mg, 2 Times Daily With Meals      Oncovite tablet tablet  Generic drug: multivitamin with minerals   0.5 tablets, Daily      pioglitazone 45 MG tablet  Commonly known as: ACTOS   45 mg, Daily PRN             Stop These Medications      amLODIPine 10 MG tablet  Commonly known as: NORVASC     aspirin 81 MG EC tablet  Replaced by: aspirin 325 MG tablet     carvedilol 12.5 MG tablet  Commonly known as: COREG     Entresto 49-51 MG tablet  Generic drug: sacubitril-valsartan     hydroCHLOROthiazide 25 MG tablet     naproxen 500 MG tablet  Commonly known as: NAPROSYN     potassium chloride 20 MEQ CR tablet  Commonly known as: KLOR-CON M20     SITagliptin 100 MG tablet  Commonly known as:  LEONOR            ASK your doctor about these medications        Instructions Start Date   acetaminophen 500 MG tablet  Commonly known as: TYLENOL  Ask about: Should I take this medication?   500 mg, Oral, Every 8 Hours               Discharge Diet:   Diet Instructions       Diet: Cardiac Diets, Diabetic Diets; Healthy Heart (2-3 Na+); Regular (IDDSI 7); Thin (IDDSI 0); Consistent Carbohydrate      Discharge Diet:  Cardiac Diets  Diabetic Diets       Cardiac Diet: Healthy Heart (2-3 Na+)    Texture: Regular (IDDSI 7)    Fluid Consistency: Thin (IDDSI 0)    Diabetic Diet: Consistent Carbohydrate            Activity at Discharge:   Activity Instructions       Bathing Restrictions      Type of Restriction: Bathing    Bathing Restrictions: No Tub Bath    Driving Restrictions      Type of Restriction: Driving    Driving Restrictions: No Driving Until Next Appointment    Lifting Restrictions      Type of Restriction: Lifting    Lifting Restrictions: Lifting Restriction (Indicate Limit)    Weight Limit (Pounds): 10    Length of Lifting Restriction: until next appointment            Follow-up Appointments  Future Appointments   Date Time Provider Department Center   3/12/2025 12:30 PM Ana Maria Zarco APRN [facility]        WOUND CARE: Keep incisions clean and dry at all times. Take a shower daily. Do NOT take a tub bath or submerge yourself in hot tubs, swimming pools, or any other body of water until seen by the cardiac surgeon in your follow-up visit. Clean  your body and incisions daily with Dial soap and water. Always use a clean washcloth. Do not scrub your incision(s). Do not re-use dressings on your incision(s). Do not use any lotions, creams, oils, powders, antibiotic ointment (i.e., Neosporin), peroxide, alcohol, or iodine UNLESS told to do by the cardiac surgeon.  DO NOT remove lizzy or suture.  Dr. Del Castillo's office will manage lizzy/suture    Karina De La Cruz PA-C  02/12/25  15:10 EST

## 2025-02-19 ENCOUNTER — READMISSION MANAGEMENT (OUTPATIENT)
Dept: CALL CENTER | Facility: HOSPITAL | Age: 80
End: 2025-02-19
Payer: MEDICARE

## 2025-02-19 NOTE — OUTREACH NOTE
CT Surgery Week 2 Survey      Flowsheet Row Responses   [facility] patient discharged from? [place]   Does the patient have one of the following disease processes/diagnoses(primary or secondary)? Cardiothoracic surgery   Week 2 attempt successful? Yes   Call start time 1615   Call end time 1635   Is patient permission given to speak with other caregiver? Yes   Person spoke with today (if not patient) and relationship Vero-daughter.   Meds reviewed with patient/caregiver? Yes   Is the patient having any side effects they believe may be caused by any medication additions or changes? No   Does the patient have all medications related to this admission filled (includes all antibiotics, pain medications, cardiac medications, etc.) Yes   Is the patient taking all medications as directed (includes completed medication regime)? Yes   Comments regarding appointments Has seen her PCP already, and will  see again in March. CT surgeon appt 03/04/25. Daughter  is waiting on call back from PCP office regarding blood transfusion ordered today for low Hgb.   Does the patient have a primary care provider?  Yes   Does the patient have an appointment scheduled with their C/T surgeon? Yes   Has the patient kept scheduled appointments due by today? Yes   Has home health visited the patient within 72 hours of discharge? Yes   Home health comments Daughter states HH told her that PCP needs to reorder HH. States PCP aware and reordering HH.   DME comments Reports O2 sats have been running in the upper 80s until today. States O2 sat this morning was 92% on RA. Reviewed O2 sat parameters-advised to seek immediate medical attention if O2 sat remains below 90% at rest. Daughter states will monitor closely.   Psychosocial issues? No   Did the patient receive a copy of their discharge instructions? Yes   Nursing interventions Reviewed instructions with patient   What is the patient's perception of their health status since  discharge? Worsening  [Daughter states will be getting a blood transfusion due to low Hgb-states waiting on call back from PCP office with location of transfusion.]   Nursing interventions Nurse provided patient education, Advised patient to call provider   Is the patient/caregiver able to teach back signs and symptoms of incisional infection? Increased redness, swelling or pain at the incisonal site, Increased drainage or bleeding, Incisional warmth, Pus or odor from incision, Fever   Is the patient /caregiver able to teach back the importance of cardiac rehab? Yes   Nursing interventions Provided education on importance of cardiac rehab   If the patient is a current smoker, are they able to teach back resources for cessation? Not a smoker   Is the patient/caregiver able to teach back the hierarchy of who to call/visit for symptoms/problems? PCP, Specialist, Home health nurse, Urgent Care, ED, 911 Yes   Additional teach back comments Reports BP has been elevated since discharge. BP this morning was 173/81.   Week 2 call completed? Yes   Is the patient interested in additional calls from an ambulatory ? No   Would this patient benefit from a Referral to [facility] Social Work? No   Wrap up additional comments Daughter states patient has worsened over past few days. States PCP aware, and will be calling back with where patient to receive blood transfusion for low hgb. States patient has had increased SOA with low O2 sats. States PCP aware. States BP has been running elevated at times-173/81 this morning. Believes is due to BP medication being d/c at discharge. Advised to notify PCP for evaluation. Denies any edema or chest pain. States incisions healing without s/s of infection. States aware to contact  Nurse Call Center with any questions/concerns. States aware to return to ER with any worsening s/s.   Call end time 1635            Isadora SAVAGE - Registered Nurse

## 2025-03-09 ENCOUNTER — APPOINTMENT (OUTPATIENT)
Dept: CT IMAGING | Facility: HOSPITAL | Age: 80
End: 2025-03-09
Payer: MEDICARE

## 2025-03-09 ENCOUNTER — APPOINTMENT (OUTPATIENT)
Dept: GENERAL RADIOLOGY | Facility: HOSPITAL | Age: 80
End: 2025-03-09
Payer: MEDICARE

## 2025-03-09 ENCOUNTER — HOSPITAL ENCOUNTER (INPATIENT)
Facility: HOSPITAL | Age: 80
LOS: 3 days | Discharge: ANOTHER HOSPITAL | End: 2025-03-12
Attending: STUDENT IN AN ORGANIZED HEALTH CARE EDUCATION/TRAINING PROGRAM | Admitting: INTERNAL MEDICINE
Payer: MEDICARE

## 2025-03-09 DIAGNOSIS — I21.4 NSTEMI (NON-ST ELEVATION MYOCARDIAL INFARCTION): Primary | ICD-10-CM

## 2025-03-09 DIAGNOSIS — R09.89 SUSPECTED CHF (CONGESTIVE HEART FAILURE): ICD-10-CM

## 2025-03-09 PROBLEM — R07.9 CHEST PAIN: Status: ACTIVE | Noted: 2025-03-09

## 2025-03-09 LAB
ALBUMIN SERPL-MCNC: 4.6 G/DL (ref 3.5–5.2)
ALBUMIN/GLOB SERPL: 1.2 G/DL
ALP SERPL-CCNC: 97 U/L (ref 39–117)
ALT SERPL W P-5'-P-CCNC: 25 U/L (ref 1–33)
ANION GAP SERPL CALCULATED.3IONS-SCNC: 20.9 MMOL/L (ref 5–15)
ANISOCYTOSIS BLD QL: NORMAL
APTT PPP: 30.5 SECONDS (ref 70–100)
AST SERPL-CCNC: 34 U/L (ref 1–32)
BASOPHILS # BLD AUTO: 0.08 10*3/MM3 (ref 0–0.2)
BASOPHILS NFR BLD AUTO: 0.8 % (ref 0–1.5)
BILIRUB SERPL-MCNC: 0.3 MG/DL (ref 0–1.2)
BUN SERPL-MCNC: 25 MG/DL (ref 8–23)
BUN/CREAT SERPL: 23.8 (ref 7–25)
CALCIUM SPEC-SCNC: 9.4 MG/DL (ref 8.6–10.5)
CHLORIDE SERPL-SCNC: 97 MMOL/L (ref 98–107)
CO2 SERPL-SCNC: 19.1 MMOL/L (ref 22–29)
CREAT SERPL-MCNC: 1.05 MG/DL (ref 0.57–1)
DEPRECATED RDW RBC AUTO: 65.9 FL (ref 37–54)
EGFRCR SERPLBLD CKD-EPI 2021: 54.8 ML/MIN/1.73
EOSINOPHIL # BLD AUTO: 0.1 10*3/MM3 (ref 0–0.4)
EOSINOPHIL NFR BLD AUTO: 1 % (ref 0.3–6.2)
ERYTHROCYTE [DISTWIDTH] IN BLOOD BY AUTOMATED COUNT: 20.7 % (ref 12.3–15.4)
FLUAV RNA RESP QL NAA+PROBE: NOT DETECTED
FLUBV RNA RESP QL NAA+PROBE: NOT DETECTED
GEN 5 1HR TROPONIN T REFLEX: 199 NG/L
GLOBULIN UR ELPH-MCNC: 3.9 GM/DL
GLUCOSE SERPL-MCNC: 303 MG/DL (ref 65–99)
HCT VFR BLD AUTO: 42 % (ref 34–46.6)
HGB BLD-MCNC: 12.7 G/DL (ref 12–15.9)
HOLD SPECIMEN: NORMAL
HOLD SPECIMEN: NORMAL
IMM GRANULOCYTES # BLD AUTO: 0.05 10*3/MM3 (ref 0–0.05)
IMM GRANULOCYTES NFR BLD AUTO: 0.5 % (ref 0–0.5)
INR PPP: 1.1 (ref 0.9–1.1)
LYMPHOCYTES # BLD AUTO: 1.33 10*3/MM3 (ref 0.7–3.1)
LYMPHOCYTES NFR BLD AUTO: 12.8 % (ref 19.6–45.3)
MCH RBC QN AUTO: 26.6 PG (ref 26.6–33)
MCHC RBC AUTO-ENTMCNC: 30.2 G/DL (ref 31.5–35.7)
MCV RBC AUTO: 88.1 FL (ref 79–97)
MONOCYTES # BLD AUTO: 0.37 10*3/MM3 (ref 0.1–0.9)
MONOCYTES NFR BLD AUTO: 3.6 % (ref 5–12)
NEUTROPHILS NFR BLD AUTO: 8.43 10*3/MM3 (ref 1.7–7)
NEUTROPHILS NFR BLD AUTO: 81.3 % (ref 42.7–76)
NRBC BLD AUTO-RTO: 0 /100 WBC (ref 0–0.2)
NT-PROBNP SERPL-MCNC: ABNORMAL PG/ML (ref 0–1800)
PLAT MORPH BLD: NORMAL
PLATELET # BLD AUTO: 433 10*3/MM3 (ref 140–450)
PMV BLD AUTO: 10.7 FL (ref 6–12)
POTASSIUM SERPL-SCNC: 4.2 MMOL/L (ref 3.5–5.2)
PROT SERPL-MCNC: 8.5 G/DL (ref 6–8.5)
PROTHROMBIN TIME: 14.7 SECONDS (ref 12.3–15.1)
RBC # BLD AUTO: 4.77 10*6/MM3 (ref 3.77–5.28)
SARS-COV-2 RNA RESP QL NAA+PROBE: NOT DETECTED
SODIUM SERPL-SCNC: 137 MMOL/L (ref 136–145)
STOMATOCYTES BLD QL SMEAR: NORMAL
TROPONIN T % DELTA: -7
TROPONIN T NUMERIC DELTA: -14 NG/L
TROPONIN T SERPL HS-MCNC: 213 NG/L
UFH PPP CHRO-ACNC: 0.1 IU/ML (ref 0.3–0.7)
WBC MORPH BLD: NORMAL
WBC NRBC COR # BLD AUTO: 10.36 10*3/MM3 (ref 3.4–10.8)
WHOLE BLOOD HOLD COAG: NORMAL
WHOLE BLOOD HOLD SPECIMEN: NORMAL

## 2025-03-09 PROCEDURE — 93005 ELECTROCARDIOGRAM TRACING: CPT | Performed by: STUDENT IN AN ORGANIZED HEALTH CARE EDUCATION/TRAINING PROGRAM

## 2025-03-09 PROCEDURE — 85007 BL SMEAR W/DIFF WBC COUNT: CPT | Performed by: STUDENT IN AN ORGANIZED HEALTH CARE EDUCATION/TRAINING PROGRAM

## 2025-03-09 PROCEDURE — 71275 CT ANGIOGRAPHY CHEST: CPT

## 2025-03-09 PROCEDURE — 99291 CRITICAL CARE FIRST HOUR: CPT

## 2025-03-09 PROCEDURE — 80053 COMPREHEN METABOLIC PANEL: CPT | Performed by: STUDENT IN AN ORGANIZED HEALTH CARE EDUCATION/TRAINING PROGRAM

## 2025-03-09 PROCEDURE — 83880 ASSAY OF NATRIURETIC PEPTIDE: CPT | Performed by: STUDENT IN AN ORGANIZED HEALTH CARE EDUCATION/TRAINING PROGRAM

## 2025-03-09 PROCEDURE — 25510000001 IOPAMIDOL 61 % SOLUTION: Performed by: STUDENT IN AN ORGANIZED HEALTH CARE EDUCATION/TRAINING PROGRAM

## 2025-03-09 PROCEDURE — 85730 THROMBOPLASTIN TIME PARTIAL: CPT | Performed by: STUDENT IN AN ORGANIZED HEALTH CARE EDUCATION/TRAINING PROGRAM

## 2025-03-09 PROCEDURE — 84484 ASSAY OF TROPONIN QUANT: CPT | Performed by: STUDENT IN AN ORGANIZED HEALTH CARE EDUCATION/TRAINING PROGRAM

## 2025-03-09 PROCEDURE — 25010000002 FUROSEMIDE PER 20 MG: Performed by: STUDENT IN AN ORGANIZED HEALTH CARE EDUCATION/TRAINING PROGRAM

## 2025-03-09 PROCEDURE — 85610 PROTHROMBIN TIME: CPT | Performed by: STUDENT IN AN ORGANIZED HEALTH CARE EDUCATION/TRAINING PROGRAM

## 2025-03-09 PROCEDURE — 85025 COMPLETE CBC W/AUTO DIFF WBC: CPT | Performed by: STUDENT IN AN ORGANIZED HEALTH CARE EDUCATION/TRAINING PROGRAM

## 2025-03-09 PROCEDURE — 99285 EMERGENCY DEPT VISIT HI MDM: CPT | Performed by: STUDENT IN AN ORGANIZED HEALTH CARE EDUCATION/TRAINING PROGRAM

## 2025-03-09 PROCEDURE — 87636 SARSCOV2 & INF A&B AMP PRB: CPT | Performed by: STUDENT IN AN ORGANIZED HEALTH CARE EDUCATION/TRAINING PROGRAM

## 2025-03-09 PROCEDURE — 85520 HEPARIN ASSAY: CPT | Performed by: STUDENT IN AN ORGANIZED HEALTH CARE EDUCATION/TRAINING PROGRAM

## 2025-03-09 PROCEDURE — 25010000002 HEPARIN (PORCINE) PER 1000 UNITS: Performed by: STUDENT IN AN ORGANIZED HEALTH CARE EDUCATION/TRAINING PROGRAM

## 2025-03-09 PROCEDURE — 99222 1ST HOSP IP/OBS MODERATE 55: CPT | Performed by: INTERNAL MEDICINE

## 2025-03-09 PROCEDURE — 71045 X-RAY EXAM CHEST 1 VIEW: CPT

## 2025-03-09 RX ORDER — FUROSEMIDE 10 MG/ML
40 INJECTION INTRAMUSCULAR; INTRAVENOUS ONCE
Status: COMPLETED | OUTPATIENT
Start: 2025-03-09 | End: 2025-03-09

## 2025-03-09 RX ORDER — IOPAMIDOL 612 MG/ML
85 INJECTION, SOLUTION INTRAVASCULAR
Status: COMPLETED | OUTPATIENT
Start: 2025-03-09 | End: 2025-03-09

## 2025-03-09 RX ORDER — HEPARIN SODIUM 10000 [USP'U]/100ML
20 INJECTION, SOLUTION INTRAVENOUS
Status: DISCONTINUED | OUTPATIENT
Start: 2025-03-09 | End: 2025-03-12 | Stop reason: HOSPADM

## 2025-03-09 RX ORDER — HEPARIN SODIUM 1000 [USP'U]/ML
60 INJECTION, SOLUTION INTRAVENOUS; SUBCUTANEOUS ONCE
Status: COMPLETED | OUTPATIENT
Start: 2025-03-09 | End: 2025-03-09

## 2025-03-09 RX ORDER — HEPARIN SODIUM 1000 [USP'U]/ML
50 INJECTION, SOLUTION INTRAVENOUS; SUBCUTANEOUS AS NEEDED
Status: DISCONTINUED | OUTPATIENT
Start: 2025-03-09 | End: 2025-03-09 | Stop reason: ALTCHOICE

## 2025-03-09 RX ORDER — FUROSEMIDE 10 MG/ML
40 INJECTION INTRAMUSCULAR; INTRAVENOUS EVERY 12 HOURS
Status: DISCONTINUED | OUTPATIENT
Start: 2025-03-10 | End: 2025-03-12 | Stop reason: HOSPADM

## 2025-03-09 RX ORDER — HEPARIN SODIUM 1000 [USP'U]/ML
25 INJECTION, SOLUTION INTRAVENOUS; SUBCUTANEOUS AS NEEDED
Status: DISCONTINUED | OUTPATIENT
Start: 2025-03-09 | End: 2025-03-09 | Stop reason: ALTCHOICE

## 2025-03-09 RX ADMIN — NITROGLYCERIN 0.5 INCH: 20 OINTMENT TOPICAL at 19:04

## 2025-03-09 RX ADMIN — IOPAMIDOL 85 ML: 612 INJECTION, SOLUTION INTRAVENOUS at 20:10

## 2025-03-09 RX ADMIN — HEPARIN SODIUM 2990 UNITS: 1000 INJECTION, SOLUTION INTRAVENOUS; SUBCUTANEOUS at 23:17

## 2025-03-09 RX ADMIN — FUROSEMIDE 40 MG: 10 INJECTION, SOLUTION INTRAMUSCULAR; INTRAVENOUS at 23:22

## 2025-03-09 RX ADMIN — HEPARIN SODIUM 12 UNITS/KG/HR: 10000 INJECTION, SOLUTION INTRAVENOUS at 23:17

## 2025-03-09 NOTE — ED PROVIDER NOTES
[facility]  Emergency Department Encounter  Emergency Medicine Physician Note       Pt Name: Abena Washington  MRN: [id number]  Pt :   1945  Room Number:    Date of encounter:  3/9/2025  PCP: Eddi Cheatham MD  ED Physician: Clovis Winter DO    HPI:  Abena Washington is a 79 y.o. female who presents to the ED with chief complaint of chest pain.  The started gradually on Friday.  Has had multiple episodes of chest pain over the weekend.  Described as a pressure sensation.  Radiates to the upper arms and back.  Associated nausea and belching.  Minimal pain upon arrival.  No fever, chills, cough, abdominal pain, problems urination or bowel movements, leg pain or swelling.    Pertinent past medical history: CAD status post CABG x 5 in January.    PAST MEDICAL HISTORY  Past Medical History:   Diagnosis Date    Anemia     Anxiety and depression     Arthritis     Cataracts, bilateral     Coronary artery disease     Delayed emergence from anesthesia     Diabetes mellitus     Heart attack     Hyperlipidemia     Hypertension     Nausea     Peripheral neuropathy     PONV (postoperative nausea and vomiting)     Skin melanoma     Wears glasses      Current Outpatient Medications   Medication Instructions    alendronate (FOSAMAX) 70 mg, Every 7 Days    aspirin 325 mg, Oral, Daily    atorvastatin (LIPITOR) 40 mg, Oral, Nightly    empagliflozin (JARDIANCE) 10 mg, Oral, Daily    glipizide (GLUCOTROL) 20 mg, 2 Times Daily Before Meals    metFORMIN (GLUCOPHAGE) 1,000 mg, 2 Times Daily With Meals    metoprolol tartrate (LOPRESSOR) 25 MG tablet Take 0.5 tablet by mouth Every 12 (Twelve) Hours.    multivitamin with minerals (Oncovite) tablet tablet 0.5 tablets, Daily    pioglitazone (ACTOS) 45 mg, Daily PRN      PAST SURGICAL HISTORY  Past Surgical History:   Procedure Laterality Date    CARDIAC CATHETERIZATION      CATARACT EXTRACTION W/ INTRAOCULAR LENS IMPLANT Left 2023    Procedure: CATARACT PHACO  EXTRACTION WITH INTRAOCULAR LENS IMPLANT LEFT;  Surgeon: Mina Hendrickson MD;  Location: [facility] OR;  Service: Ophthalmology;  Laterality: Left;    CATARACT EXTRACTION W/ INTRAOCULAR LENS IMPLANT Right 07/07/2023    Procedure: CATARACT PHACO EXTRACTION WITH INTRAOCULAR LENS IMPLANT RIGHT;  Surgeon: Mina Hendrickson MD;  Location: [facility] OR;  Service: Ophthalmology;  Laterality: Right;    CHOLECYSTECTOMY      COLONOSCOPY      CORONARY ARTERY BYPASS GRAFT N/A 1/28/2025    Procedure: MEDIAN STERNOTOMY, CORONARY ARTERY BYPASS GRAFTING X 5, UTILIZING THE LEFT INTERNAL MAMMARY ARTERY, ENDOSCOPIC VEIN HARVESTING OF THE LEFT SAPHENOUS VEIN, TRANSESOPHAGEAL ECHOCARDIOGRAM WITH ANESTHESIA;  Surgeon: Zeferino Del Castillo MD;  Location: [facility] OR;  Service: Cardiothoracic;  Laterality: N/A;    ENDOSCOPY      HYSTERECTOMY      SKIN CANCER EXCISION      TUBAL ABDOMINAL LIGATION         FAMILY HISTORY  Family History   Problem Relation Age of Onset    Diabetes Mother     Dementia Mother     Coronary artery disease Father     Stroke Son     Coronary artery disease Son        SOCIAL HISTORY  Social History     Socioeconomic History    Marital status:    Tobacco Use    Smoking status: Never    Smokeless tobacco: Never   Vaping Use    Vaping status: Never Used   Substance and Sexual Activity    Alcohol use: Never    Drug use: Never    Sexual activity: Defer     ALLERGIES  Patient has no known allergies.    REVIEW OF SYSTEMS  All systems reviewed and negative except for those discussed in HPI.     PHYSICAL EXAM  ED Triage Vitals [03/09/25 1829]   Temp Heart Rate Resp BP SpO2   97.3 °F (36.3 °C) 102 20 144/89 96 %      Temp src Heart Rate Source Patient Position BP Location FiO2 (%)   Axillary Monitor Sitting Left arm --     I have reviewed the triage vital signs and nursing notes.    General: Alert.  Nontoxic appearance.  No acute distress.  Head: Normocephalic.  Atraumatic.  Eyes: No scleral icterus.  ENT: Moist mucous  membranes.  Cardiovascular: Regular rate and rhythm.  No murmurs.  No rubs.  2+ distal pulses bilaterally.  Respiratory: Equal breath sounds bilaterally. No wheezing. No rales.  No rhonchi.  GI: Abdomen is soft.  Nondistended.  Nontender to palpation.  No rebound.  No guarding.  No CVA tenderness.  MSK: Moves all 4 extremities.  Neurologic: Oriented x 3.  No focal deficits.  Skin: Sternotomy incision present.  No surrounding skin changes.  No edema. No erythema. No pallor. No cyanosis.  Psych: Normal mood and affect.    LAB RESULTS  Recent Results (from the past 24 hours)   High Sensitivity Troponin T    Collection Time: 03/09/25  6:44 PM    Specimen: Blood   Result Value Ref Range    HS Troponin T 213 (C) <14 ng/L   Comprehensive Metabolic Panel    Collection Time: 03/09/25  6:44 PM    Specimen: Blood   Result Value Ref Range    Glucose 303 (H) 65 - 99 mg/dL    BUN 25 (H) 8 - 23 mg/dL    Creatinine 1.05 (H) 0.57 - 1.00 mg/dL    Sodium 137 136 - 145 mmol/L    Potassium 4.2 3.5 - 5.2 mmol/L    Chloride 97 (L) 98 - 107 mmol/L    CO2 19.1 (L) 22.0 - 29.0 mmol/L    Calcium 9.4 8.6 - 10.5 mg/dL    Total Protein 8.5 6.0 - 8.5 g/dL    Albumin 4.6 3.5 - 5.2 g/dL    ALT (SGPT) 25 1 - 33 U/L    AST (SGOT) 34 (H) 1 - 32 U/L    Alkaline Phosphatase 97 39 - 117 U/L    Total Bilirubin 0.3 0.0 - 1.2 mg/dL    Globulin 3.9 gm/dL    A/G Ratio 1.2 g/dL    BUN/Creatinine Ratio 23.8 7.0 - 25.0    Anion Gap 20.9 (H) 5.0 - 15.0 mmol/L    eGFR 54.8 (L) >60.0 mL/min/1.73   BNP    Collection Time: 03/09/25  6:44 PM    Specimen: Blood   Result Value Ref Range    proBNP 20,136.0 (H) 0.0 - 1,800.0 pg/mL   Green Top (Gel)    Collection Time: 03/09/25  6:44 PM   Result Value Ref Range    Extra Tube Hold for add-ons.    Lavender Top    Collection Time: 03/09/25  6:44 PM   Result Value Ref Range    Extra Tube hold for add-on    Gold Top - SST    Collection Time: 03/09/25  6:44 PM   Result Value Ref Range    Extra Tube Hold for add-ons.    Light  Blue Top    Collection Time: 03/09/25  6:44 PM   Result Value Ref Range    Extra Tube Hold for add-ons.    CBC Auto Differential    Collection Time: 03/09/25  6:44 PM    Specimen: Blood   Result Value Ref Range    WBC 10.36 3.40 - 10.80 10*3/mm3    RBC 4.77 3.77 - 5.28 10*6/mm3    Hemoglobin 12.7 12.0 - 15.9 g/dL    Hematocrit 42.0 34.0 - 46.6 %    MCV 88.1 79.0 - 97.0 fL    MCH 26.6 26.6 - 33.0 pg    MCHC 30.2 (L) 31.5 - 35.7 g/dL    RDW 20.7 (H) 12.3 - 15.4 %    RDW-SD 65.9 (H) 37.0 - 54.0 fl    MPV 10.7 6.0 - 12.0 fL    Platelets 433 140 - 450 10*3/mm3    Neutrophil % 81.3 (H) 42.7 - 76.0 %    Lymphocyte % 12.8 (L) 19.6 - 45.3 %    Monocyte % 3.6 (L) 5.0 - 12.0 %    Eosinophil % 1.0 0.3 - 6.2 %    Basophil % 0.8 0.0 - 1.5 %    Immature Grans % 0.5 0.0 - 0.5 %    Neutrophils, Absolute 8.43 (H) 1.70 - 7.00 10*3/mm3    Lymphocytes, Absolute 1.33 0.70 - 3.10 10*3/mm3    Monocytes, Absolute 0.37 0.10 - 0.90 10*3/mm3    Eosinophils, Absolute 0.10 0.00 - 0.40 10*3/mm3    Basophils, Absolute 0.08 0.00 - 0.20 10*3/mm3    Immature Grans, Absolute 0.05 0.00 - 0.05 10*3/mm3    nRBC 0.0 0.0 - 0.2 /100 WBC   Scan Slide    Collection Time: 03/09/25  6:44 PM    Specimen: Blood   Result Value Ref Range    Anisocytosis Slight/1+ None Seen    Stomatocytes Slight/1+ None Seen    WBC Morphology Normal Normal    Platelet Morphology Normal Normal   COVID-19 and FLU A/B PCR, 1 HR TAT - Swab, Nasopharynx    Collection Time: 03/09/25  7:06 PM    Specimen: Nasopharynx; Swab   Result Value Ref Range    COVID19 Not Detected Not Detected - Ref. Range    Influenza A PCR Not Detected Not Detected    Influenza B PCR Not Detected Not Detected   High Sensitivity Troponin T 1Hr    Collection Time: 03/09/25  8:36 PM    Specimen: Blood   Result Value Ref Range    HS Troponin T 199 (C) <14 ng/L    Troponin T Numeric Delta -14 ng/L    Troponin T % Delta -7 Abnormal if >/= 20%       RADIOLOGY  CT Angiogram Chest Pulmonary Embolism  Result Date:  3/9/2025  FINAL REPORT TECHNIQUE: null CLINICAL HISTORY: mid sternal Chest pain, recent CABG COMPARISON: null FINDINGS: CT angiography chest with contrast. 3D Postprocessing. Comparison: None Findings: No pulmonary embolism. No thoracic aortic aneurysm. Atheromatous aortic and coronary arterial calcifications. Mild-to-moderate cardiomegaly. CABG changes. Pulmonary vascular prominence with interlobular septal fluid. Mild bibasilar atelectasis. No focal infiltrates or masses. No pneumothorax Moderate right and small left effusions. No adenopathy. No acute upper abdominal pathology. Bilateral low-density adrenal nodules consistent with adenomas, 2.8 cm on the right and 1.8 cm on the left. No acute chest wall pathology. No acute fracture or dislocation. Several remote left rib fractures. Sternotomy. Degenerative changes.     IMPRESSION: No pulmonary emboli. Combination of findings including cardiomegaly, pleural effusions, and interlobular septal fluid consistent with volume overload without william alveolar edema. Bilateral low-density adrenal nodules consistent with adenomas Authenticated and Electronically Signed by Alycia Perry MD on 03/09/2025 09:36:16 PM      PROCEDURES  Critical Care    Performed by: Clovis Winter DO  Authorized by: Clovis Winter DO    Comments:      CRITICAL CARE PROCEDURE NOTE  Authorized and performed by: Dr. Winter    Total critical care time: Approximately 50 minutes.    Patient was critically ill due to: NSTEMI    Interventions: IV heparin, coordination of care with cardiology, close airway/mental status/hemodynamic monitoring.    Due to a high probability of clinically significant, life threatening deterioration, the patient required my highest level of preparedness to intervene emergently and I personally spent this critical care time directly and personally managing the patient.  This critical care time included obtaining a history; examining the patient; pulse oximetry;  ordering and review of studies; arranging urgent treatment with development of a management plan; evaluation of patient's response to treatment; frequent reassessment; and, discussions with other providers.    This critical care time was performed to assess and manage the high probability of imminent, life-threatening deterioration that could result in multiorgan failure.  It was exclusive of separately billable procedures, treating other patients and teaching time.    Please see MDM section and the rest of the note for further information on patient assessment and interventions.        RISK STRATIFICATION    MEDICAL DECISION MAKING  79 y.o. female with past medical history listed above who presents with chest pain.    Vital signs within normal limits.    Based on clinical presentation and physical exam, differential diagnosis includes, but is not limited to, acute coronary syndrome, obstructive coronary artery disease, pneumonia, pneumothorax, pulmonary embolism, postoperative complication.    I have discussed the indication, risk, and alternatives of the following high risk medications: Topical nitroglycerin    External notes reviewed.  Op note from 1/28/2025 reviewed.  Patient underwent CABG x 5 for multivessel CAD and unstable angina with Dr. Del Castillo at [facility].  Echo report from 1/31/2025 reviewed.  Showed EF 44%.  Calcification of the aortic valve.    At least 3 different tests have been ordered on this patient.    Medications administered in ED:  Medications   heparin (porcine) injection 2,990 Units (has no administration in time range)   heparin 79391 units/250 ml (100 units/ml) in D5W (has no administration in time range)   heparin (porcine) injection 2,500 Units (has no administration in time range)   heparin (porcine) injection 1,250 Units (has no administration in time range)   Pharmacy to Dose Heparin (has no administration in time range)   furosemide (LASIX) injection 40 mg (has no  administration in time range)   nitroglycerin (NITROSTAT) ointment 0.5 inch (0.5 inches Topical Given 3/9/25 1904)   iopamidol (ISOVUE-300) 61 % injection 85 mL (85 mL Intravenous Given 3/9/25 2010)     Please see ED course below for my interpretation of the ED workup.  ED Course as of 03/09/25 2210   Sun Mar 09, 2025   1904 ECG 12 Lead Chest Pain  EKG per my interpretation sinus tachycardia, rate 103, leftward axis, no STEMI, 1 mm ST segment depression lateral precordial leads, T wave inversions lateral limb leads, normal QRS QTc interval.   [JS]   2157 I reviewed the labs listed above.     Notable findings are highlighted below.    Old laboratory data was reviewed from the medical records and compared to today's results.   [JS]   2157 Glucose(!): 303 [JS]   2157 Creatinine(!): 1.05 [JS]   2157 proBNP(!): 20,136.0 [JS]   2157 HS Troponin T(!!): 213 [JS]   2157 HS Troponin T(!!): 199 [JS]   2157 Troponin T Numeric Delta: -14 [JS]   2157 CT Angiogram Chest Pulmonary Embolism  I have independently reviewed and interpreted the CTA chest.  My interpretation is negative for saddle pulmonary embolism.    Radiologist notes the following: No pulmonary emboli. Combination of findings including cardiomegaly, pleural effusions, and interlobular septal fluid consistent with volume overload without william alveolar edema. Bilateral low-density adrenal nodules consistent with adenomas.         [JS]      ED Course User Index  [JS] Clovis Winter DO     Case discussed with Dr. Zapata (cardiology).  He believes patient is stable to be admitted here locally.  Recommended treatment with IV heparin.  Cardiology service will see as consult.    On re-evaluation, patient resting comfortably.  Vital signs remained stable on room air. Will proceed with medical admission for further workup and management.  I discussed the findings of the ED workup with the patient including my recommendation for admission.  Patient agreeable with plan and  disposition.    Case discussed with Dr. Hernandez (hospitalist) who agrees to evaluate and admit the patient.  We discussed the HPI, pertinent PMHx, ED course and workup.    REPEAT VITAL SIGNS  AS OF 22:10 EDT VITALS:  BP - 146/83  HR - 93  TEMP - 97.3 °F (36.3 °C) (Axillary)  O2 SATS - 92%    DIAGNOSIS  Final diagnoses:   NSTEMI (non-ST elevation myocardial infarction)   Suspected CHF (congestive heart failure)     DISPOSITION  ED Disposition       ED Disposition   Decision to Admit    Condition   --    Comment   Level of Care: Telemetry [5]   Diagnosis: Chest pain [990088]   Certification: I Certify That Inpatient Hospital Services Are Medically Necessary For Greater Than 2 Midnights               Please note that portions of this document were completed with voice recognition software.        Clovis Winter DO  03/10/25 1023

## 2025-03-10 ENCOUNTER — APPOINTMENT (OUTPATIENT)
Dept: CARDIOLOGY | Facility: HOSPITAL | Age: 80
End: 2025-03-10
Payer: MEDICARE

## 2025-03-10 PROBLEM — I21.4 NSTEMI (NON-ST ELEVATED MYOCARDIAL INFARCTION): Status: ACTIVE | Noted: 2025-03-09

## 2025-03-10 LAB
ANION GAP SERPL CALCULATED.3IONS-SCNC: 20.7 MMOL/L (ref 5–15)
AV MEAN PRESS GRAD SYS DOP V1V2: 3 MMHG
AV VMAX SYS DOP: 115 CM/SEC
BASOPHILS # BLD AUTO: 0.08 10*3/MM3 (ref 0–0.2)
BASOPHILS NFR BLD AUTO: 0.8 % (ref 0–1.5)
BH CV ECHO MEAS - AO MAX PG: 5.3 MMHG
BH CV ECHO MEAS - AO ROOT DIAM: 2.9 CM
BH CV ECHO MEAS - AO V2 VTI: 20.5 CM
BH CV ECHO MEAS - AVA(I,D): 2.39 CM2
BH CV ECHO MEAS - EDV(CUBED): 120.6 ML
BH CV ECHO MEAS - EDV(MOD-SP2): 96 ML
BH CV ECHO MEAS - EDV(MOD-SP4): 94.3 ML
BH CV ECHO MEAS - EF(MOD-SP2): 28.4 %
BH CV ECHO MEAS - EF(MOD-SP4): 39.3 %
BH CV ECHO MEAS - ESV(CUBED): 72 ML
BH CV ECHO MEAS - ESV(MOD-SP2): 68.7 ML
BH CV ECHO MEAS - ESV(MOD-SP4): 57.2 ML
BH CV ECHO MEAS - FS: 15.8 %
BH CV ECHO MEAS - IVS/LVPW: 1.28 CM
BH CV ECHO MEAS - IVSD: 0.97 CM
BH CV ECHO MEAS - LA DIMENSION: 3.6 CM
BH CV ECHO MEAS - LAT PEAK E' VEL: 7.6 CM/SEC
BH CV ECHO MEAS - LV DIASTOLIC VOL/BSA (35-75): 63.6 CM2
BH CV ECHO MEAS - LV MASS(C)D: 147.2 GRAMS
BH CV ECHO MEAS - LV MAX PG: 3.7 MMHG
BH CV ECHO MEAS - LV MEAN PG: 2 MMHG
BH CV ECHO MEAS - LV SYSTOLIC VOL/BSA (12-30): 38.6 CM2
BH CV ECHO MEAS - LV V1 MAX: 95.9 CM/SEC
BH CV ECHO MEAS - LV V1 VTI: 18.8 CM
BH CV ECHO MEAS - LVIDD: 4.9 CM
BH CV ECHO MEAS - LVIDS: 4.2 CM
BH CV ECHO MEAS - LVOT AREA: 2.6 CM2
BH CV ECHO MEAS - LVOT DIAM: 1.82 CM
BH CV ECHO MEAS - LVPWD: 0.76 CM
BH CV ECHO MEAS - MED PEAK E' VEL: 4.7 CM/SEC
BH CV ECHO MEAS - MV A MAX VEL: 61.8 CM/SEC
BH CV ECHO MEAS - MV DEC TIME: 0.22 SEC
BH CV ECHO MEAS - MV E MAX VEL: 113 CM/SEC
BH CV ECHO MEAS - MV E/A: 1.83
BH CV ECHO MEAS - MV MAX PG: 5.1 MMHG
BH CV ECHO MEAS - MV MEAN PG: 2 MMHG
BH CV ECHO MEAS - MV V2 VTI: 38.1 CM
BH CV ECHO MEAS - MVA(VTI): 1.28 CM2
BH CV ECHO MEAS - PA ACC TIME: 0.05 SEC
BH CV ECHO MEAS - PA V2 MAX: 84.3 CM/SEC
BH CV ECHO MEAS - RAP SYSTOLE: 3 MMHG
BH CV ECHO MEAS - RV MAX PG: 2.5 MMHG
BH CV ECHO MEAS - RV V1 MAX: 79.5 CM/SEC
BH CV ECHO MEAS - RV V1 VTI: 17.6 CM
BH CV ECHO MEAS - RVDD: 2.26 CM
BH CV ECHO MEAS - SV(LVOT): 48.9 ML
BH CV ECHO MEAS - SV(MOD-SP2): 27.3 ML
BH CV ECHO MEAS - SV(MOD-SP4): 37.1 ML
BH CV ECHO MEAS - SVI(LVOT): 33 ML/M2
BH CV ECHO MEAS - SVI(MOD-SP2): 18.4 ML/M2
BH CV ECHO MEAS - SVI(MOD-SP4): 25 ML/M2
BH CV ECHO MEAS - TAPSE (>1.6): 1.27 CM
BH CV ECHO MEASUREMENTS AVERAGE E/E' RATIO: 18.37
BH CV XLRA - RV BASE: 2.9 CM
BH CV XLRA - RV MID: 1.8 CM
BH CV XLRA - TDI S': 6 CM/SEC
BUN SERPL-MCNC: 22 MG/DL (ref 8–23)
BUN/CREAT SERPL: 25.6 (ref 7–25)
CALCIUM SPEC-SCNC: 9.1 MG/DL (ref 8.6–10.5)
CHLORIDE SERPL-SCNC: 100 MMOL/L (ref 98–107)
CHOLEST SERPL-MCNC: 177 MG/DL (ref 0–200)
CO2 SERPL-SCNC: 19.3 MMOL/L (ref 22–29)
CREAT SERPL-MCNC: 0.86 MG/DL (ref 0.57–1)
DEPRECATED RDW RBC AUTO: 64.9 FL (ref 37–54)
EGFRCR SERPLBLD CKD-EPI 2021: 69.7 ML/MIN/1.73
EOSINOPHIL # BLD AUTO: 0.01 10*3/MM3 (ref 0–0.4)
EOSINOPHIL NFR BLD AUTO: 0.1 % (ref 0.3–6.2)
ERYTHROCYTE [DISTWIDTH] IN BLOOD BY AUTOMATED COUNT: 20.8 % (ref 12.3–15.4)
GLUCOSE BLDC GLUCOMTR-MCNC: 213 MG/DL (ref 70–130)
GLUCOSE SERPL-MCNC: 197 MG/DL (ref 65–99)
HBA1C MFR BLD: 5.6 % (ref 4.8–5.6)
HCT VFR BLD AUTO: 36.8 % (ref 34–46.6)
HDLC SERPL-MCNC: 62 MG/DL (ref 40–60)
HGB BLD-MCNC: 11.4 G/DL (ref 12–15.9)
HYPOCHROMIA BLD QL: NORMAL
IMM GRANULOCYTES # BLD AUTO: 0.04 10*3/MM3 (ref 0–0.05)
IMM GRANULOCYTES NFR BLD AUTO: 0.4 % (ref 0–0.5)
LDLC SERPL CALC-MCNC: 95 MG/DL (ref 0–100)
LDLC/HDLC SERPL: 1.48 {RATIO}
LV EF 3D SEGMENTATION: 47 %
LV EF BIPLANE MOD: 34.7 %
LYMPHOCYTES # BLD AUTO: 1.41 10*3/MM3 (ref 0.7–3.1)
LYMPHOCYTES NFR BLD AUTO: 14.2 % (ref 19.6–45.3)
MCH RBC QN AUTO: 26.8 PG (ref 26.6–33)
MCHC RBC AUTO-ENTMCNC: 31 G/DL (ref 31.5–35.7)
MCV RBC AUTO: 86.4 FL (ref 79–97)
MICROCYTES BLD QL: NORMAL
MONOCYTES # BLD AUTO: 0.57 10*3/MM3 (ref 0.1–0.9)
MONOCYTES NFR BLD AUTO: 5.8 % (ref 5–12)
NEUTROPHILS NFR BLD AUTO: 7.8 10*3/MM3 (ref 1.7–7)
NEUTROPHILS NFR BLD AUTO: 78.7 % (ref 42.7–76)
NRBC BLD AUTO-RTO: 0 /100 WBC (ref 0–0.2)
PLAT MORPH BLD: NORMAL
PLATELET # BLD AUTO: 382 10*3/MM3 (ref 140–450)
PMV BLD AUTO: 10.7 FL (ref 6–12)
POTASSIUM SERPL-SCNC: 3.4 MMOL/L (ref 3.5–5.2)
RBC # BLD AUTO: 4.26 10*6/MM3 (ref 3.77–5.28)
SODIUM SERPL-SCNC: 140 MMOL/L (ref 136–145)
TRIGL SERPL-MCNC: 115 MG/DL (ref 0–150)
UFH PPP CHRO-ACNC: 0.1 IU/ML (ref 0.3–0.7)
UFH PPP CHRO-ACNC: 0.1 IU/ML (ref 0.3–0.7)
UFH PPP CHRO-ACNC: 0.23 IU/ML (ref 0.3–0.7)
VLDLC SERPL-MCNC: 20 MG/DL (ref 5–40)
WBC MORPH BLD: NORMAL
WBC NRBC COR # BLD AUTO: 9.91 10*3/MM3 (ref 3.4–10.8)

## 2025-03-10 PROCEDURE — 82948 REAGENT STRIP/BLOOD GLUCOSE: CPT

## 2025-03-10 PROCEDURE — 83036 HEMOGLOBIN GLYCOSYLATED A1C: CPT | Performed by: INTERNAL MEDICINE

## 2025-03-10 PROCEDURE — 93306 TTE W/DOPPLER COMPLETE: CPT | Performed by: INTERNAL MEDICINE

## 2025-03-10 PROCEDURE — 25010000002 HEPARIN (PORCINE) PER 1000 UNITS: Performed by: STUDENT IN AN ORGANIZED HEALTH CARE EDUCATION/TRAINING PROGRAM

## 2025-03-10 PROCEDURE — 93306 TTE W/DOPPLER COMPLETE: CPT

## 2025-03-10 PROCEDURE — 25010000002 FUROSEMIDE PER 20 MG: Performed by: INTERNAL MEDICINE

## 2025-03-10 PROCEDURE — 99232 SBSQ HOSP IP/OBS MODERATE 35: CPT | Performed by: STUDENT IN AN ORGANIZED HEALTH CARE EDUCATION/TRAINING PROGRAM

## 2025-03-10 PROCEDURE — 85007 BL SMEAR W/DIFF WBC COUNT: CPT | Performed by: STUDENT IN AN ORGANIZED HEALTH CARE EDUCATION/TRAINING PROGRAM

## 2025-03-10 PROCEDURE — 85025 COMPLETE CBC W/AUTO DIFF WBC: CPT | Performed by: STUDENT IN AN ORGANIZED HEALTH CARE EDUCATION/TRAINING PROGRAM

## 2025-03-10 PROCEDURE — 99222 1ST HOSP IP/OBS MODERATE 55: CPT | Performed by: INTERNAL MEDICINE

## 2025-03-10 PROCEDURE — 85520 HEPARIN ASSAY: CPT | Performed by: STUDENT IN AN ORGANIZED HEALTH CARE EDUCATION/TRAINING PROGRAM

## 2025-03-10 PROCEDURE — 80061 LIPID PANEL: CPT | Performed by: INTERNAL MEDICINE

## 2025-03-10 PROCEDURE — 63710000001 INSULIN LISPRO (HUMAN) PER 5 UNITS: Performed by: STUDENT IN AN ORGANIZED HEALTH CARE EDUCATION/TRAINING PROGRAM

## 2025-03-10 PROCEDURE — 80048 BASIC METABOLIC PNL TOTAL CA: CPT | Performed by: INTERNAL MEDICINE

## 2025-03-10 RX ORDER — BISACODYL 5 MG/1
5 TABLET, DELAYED RELEASE ORAL DAILY PRN
Status: DISCONTINUED | OUTPATIENT
Start: 2025-03-10 | End: 2025-03-12 | Stop reason: HOSPADM

## 2025-03-10 RX ORDER — NICOTINE POLACRILEX 4 MG
15 LOZENGE BUCCAL
Status: DISCONTINUED | OUTPATIENT
Start: 2025-03-10 | End: 2025-03-12 | Stop reason: HOSPADM

## 2025-03-10 RX ORDER — ATORVASTATIN CALCIUM 40 MG/1
40 TABLET, FILM COATED ORAL NIGHTLY
Status: DISCONTINUED | OUTPATIENT
Start: 2025-03-10 | End: 2025-03-12 | Stop reason: HOSPADM

## 2025-03-10 RX ORDER — CLOPIDOGREL BISULFATE 75 MG/1
75 TABLET ORAL DAILY
Status: DISCONTINUED | OUTPATIENT
Start: 2025-03-10 | End: 2025-03-12 | Stop reason: HOSPADM

## 2025-03-10 RX ORDER — CARVEDILOL 6.25 MG/1
3.12 TABLET ORAL 2 TIMES DAILY WITH MEALS
Status: DISCONTINUED | OUTPATIENT
Start: 2025-03-10 | End: 2025-03-12 | Stop reason: HOSPADM

## 2025-03-10 RX ORDER — HEPARIN SODIUM 1000 [USP'U]/ML
25 INJECTION, SOLUTION INTRAVENOUS; SUBCUTANEOUS ONCE
Status: COMPLETED | OUTPATIENT
Start: 2025-03-10 | End: 2025-03-10

## 2025-03-10 RX ORDER — SODIUM CHLORIDE 0.9 % (FLUSH) 0.9 %
10 SYRINGE (ML) INJECTION EVERY 12 HOURS SCHEDULED
Status: DISCONTINUED | OUTPATIENT
Start: 2025-03-10 | End: 2025-03-12 | Stop reason: HOSPADM

## 2025-03-10 RX ORDER — SACUBITRIL AND VALSARTAN 24; 26 MG/1; MG/1
1 TABLET, FILM COATED ORAL EVERY 12 HOURS SCHEDULED
Status: DISCONTINUED | OUTPATIENT
Start: 2025-03-10 | End: 2025-03-12 | Stop reason: HOSPADM

## 2025-03-10 RX ORDER — INSULIN LISPRO 100 [IU]/ML
2-9 INJECTION, SOLUTION INTRAVENOUS; SUBCUTANEOUS
Status: DISCONTINUED | OUTPATIENT
Start: 2025-03-10 | End: 2025-03-12 | Stop reason: HOSPADM

## 2025-03-10 RX ORDER — SODIUM CHLORIDE 0.9 % (FLUSH) 0.9 %
10 SYRINGE (ML) INJECTION AS NEEDED
Status: DISCONTINUED | OUTPATIENT
Start: 2025-03-10 | End: 2025-03-12 | Stop reason: HOSPADM

## 2025-03-10 RX ORDER — ACETAMINOPHEN 325 MG/1
650 TABLET ORAL EVERY 4 HOURS PRN
Status: DISCONTINUED | OUTPATIENT
Start: 2025-03-10 | End: 2025-03-12 | Stop reason: HOSPADM

## 2025-03-10 RX ORDER — BISACODYL 10 MG
10 SUPPOSITORY, RECTAL RECTAL DAILY PRN
Status: DISCONTINUED | OUTPATIENT
Start: 2025-03-10 | End: 2025-03-12 | Stop reason: HOSPADM

## 2025-03-10 RX ORDER — SODIUM CHLORIDE 9 MG/ML
40 INJECTION, SOLUTION INTRAVENOUS AS NEEDED
Status: DISCONTINUED | OUTPATIENT
Start: 2025-03-10 | End: 2025-03-12 | Stop reason: HOSPADM

## 2025-03-10 RX ORDER — NITROGLYCERIN 0.4 MG/1
0.4 TABLET SUBLINGUAL
Status: DISCONTINUED | OUTPATIENT
Start: 2025-03-10 | End: 2025-03-12 | Stop reason: HOSPADM

## 2025-03-10 RX ORDER — ALPRAZOLAM 0.25 MG
0.25 TABLET ORAL 2 TIMES DAILY PRN
Status: DISCONTINUED | OUTPATIENT
Start: 2025-03-10 | End: 2025-03-12 | Stop reason: HOSPADM

## 2025-03-10 RX ORDER — POLYETHYLENE GLYCOL 3350 17 G/17G
17 POWDER, FOR SOLUTION ORAL DAILY PRN
Status: DISCONTINUED | OUTPATIENT
Start: 2025-03-10 | End: 2025-03-12 | Stop reason: HOSPADM

## 2025-03-10 RX ORDER — DEXTROSE MONOHYDRATE 25 G/50ML
25 INJECTION, SOLUTION INTRAVENOUS
Status: DISCONTINUED | OUTPATIENT
Start: 2025-03-10 | End: 2025-03-12 | Stop reason: HOSPADM

## 2025-03-10 RX ORDER — ONDANSETRON 2 MG/ML
4 INJECTION INTRAMUSCULAR; INTRAVENOUS EVERY 6 HOURS PRN
Status: DISCONTINUED | OUTPATIENT
Start: 2025-03-10 | End: 2025-03-12 | Stop reason: HOSPADM

## 2025-03-10 RX ORDER — ACETAMINOPHEN 160 MG/5ML
650 SOLUTION ORAL EVERY 4 HOURS PRN
Status: DISCONTINUED | OUTPATIENT
Start: 2025-03-10 | End: 2025-03-12 | Stop reason: HOSPADM

## 2025-03-10 RX ORDER — ACETAMINOPHEN 650 MG/1
650 SUPPOSITORY RECTAL EVERY 4 HOURS PRN
Status: DISCONTINUED | OUTPATIENT
Start: 2025-03-10 | End: 2025-03-12 | Stop reason: HOSPADM

## 2025-03-10 RX ORDER — METOPROLOL TARTRATE 25 MG/1
12.5 TABLET, FILM COATED ORAL EVERY 12 HOURS SCHEDULED
Status: DISCONTINUED | OUTPATIENT
Start: 2025-03-10 | End: 2025-03-10

## 2025-03-10 RX ORDER — ASPIRIN 81 MG/1
81 TABLET ORAL DAILY
Status: DISCONTINUED | OUTPATIENT
Start: 2025-03-10 | End: 2025-03-12 | Stop reason: HOSPADM

## 2025-03-10 RX ORDER — AMOXICILLIN 250 MG
2 CAPSULE ORAL 2 TIMES DAILY PRN
Status: DISCONTINUED | OUTPATIENT
Start: 2025-03-10 | End: 2025-03-12 | Stop reason: HOSPADM

## 2025-03-10 RX ADMIN — ALPRAZOLAM 0.25 MG: 0.25 TABLET ORAL at 07:34

## 2025-03-10 RX ADMIN — SACUBITRIL AND VALSARTAN 1 TABLET: 24; 26 TABLET, FILM COATED ORAL at 20:39

## 2025-03-10 RX ADMIN — ASPIRIN 81 MG: 81 TABLET, COATED ORAL at 08:28

## 2025-03-10 RX ADMIN — METOPROLOL TARTRATE 12.5 MG: 25 TABLET, FILM COATED ORAL at 01:50

## 2025-03-10 RX ADMIN — SACUBITRIL AND VALSARTAN 1 TABLET: 24; 26 TABLET, FILM COATED ORAL at 13:21

## 2025-03-10 RX ADMIN — ATORVASTATIN CALCIUM 40 MG: 40 TABLET, FILM COATED ORAL at 01:50

## 2025-03-10 RX ADMIN — Medication 10 ML: at 20:40

## 2025-03-10 RX ADMIN — HEPARIN SODIUM 1250 UNITS: 1000 INJECTION, SOLUTION INTRAVENOUS; SUBCUTANEOUS at 15:28

## 2025-03-10 RX ADMIN — CLOPIDOGREL BISULFATE 75 MG: 75 TABLET ORAL at 08:28

## 2025-03-10 RX ADMIN — METOPROLOL TARTRATE 12.5 MG: 25 TABLET, FILM COATED ORAL at 10:01

## 2025-03-10 RX ADMIN — Medication 10 ML: at 01:51

## 2025-03-10 RX ADMIN — FUROSEMIDE 40 MG: 10 INJECTION, SOLUTION INTRAMUSCULAR; INTRAVENOUS at 20:39

## 2025-03-10 RX ADMIN — FUROSEMIDE 40 MG: 10 INJECTION, SOLUTION INTRAMUSCULAR; INTRAVENOUS at 07:34

## 2025-03-10 RX ADMIN — INSULIN LISPRO 4 UNITS: 100 INJECTION, SOLUTION INTRAVENOUS; SUBCUTANEOUS at 20:39

## 2025-03-10 RX ADMIN — EMPAGLIFLOZIN 10 MG: 10 TABLET, FILM COATED ORAL at 08:28

## 2025-03-10 RX ADMIN — HEPARIN SODIUM 1250 UNITS: 1000 INJECTION, SOLUTION INTRAVENOUS; SUBCUTANEOUS at 08:25

## 2025-03-10 RX ADMIN — Medication 10 ML: at 09:16

## 2025-03-10 RX ADMIN — CARVEDILOL 3.12 MG: 6.25 TABLET, FILM COATED ORAL at 17:41

## 2025-03-10 RX ADMIN — ATORVASTATIN CALCIUM 40 MG: 40 TABLET, FILM COATED ORAL at 20:40

## 2025-03-10 NOTE — PROGRESS NOTES
Pharmacy to Dose Heparin Infusion    Abena Washington is a  79 y.o. female receiving heparin infusion.     Therapy for (VTE/Cardiac):   cardiac  Patient Weight: 49.9 kg  Initial Bolus (Y/N):   Y  Any Bolus (Y/N):   Y        Signs or Symptoms of Bleeding: N    Cardiac or Other (Not VTE)   Initial Bolus: 60 units/kg (Max 4,000 units)  Initial rate: 12 units/kg/hr (Max 1,000 units/hr)   Anti Xa Rebolus Infusion Hold time Change infusion Dose (Units/kg/hr) Next Anti Xa or aPTT Level Due   < 0.1 50 Units/kg  (4000 Units Max) None Increase by  3 Units/kg/hr 6 hours   0.1- 0.19 25 Units/kg  (2000 Units Max) None Increase by  2 Units/kg/hr 6 hours   0.2 - 0.29 0 None Increase by  1 Units/kg/hr 6 hours   0.3 - 0.5 0 None No Change 6 hours (after 2 consecutive levels in range check qAM)   0.51 - 0.6 0 None Decrease by  1 Units/kg/hr 6 hours   0.61 - 0.8 0 30 Minutes Decrease by  2 Units/kg/hr 6 hours   0.81 - 1 0 60 Minutes Decrease by  3 Units/kg/hr 6 hours   >1 0 Hold  After Anti Xa less than 0.5 decrease previous rate by  4 Units/kg/hr  Every 2 hours until Anti Xa  less than 0.5 then when infusion restarts in 6 hours         Recommend anti-Xa every 6 hours.         Lab 03/10/25  1424 03/10/25  0528 03/09/25  2251 03/09/25  1844   HEMOGLOBIN  --  11.4*  --  12.7   HEMATOCRIT  --  36.8  --  42.0   PLATELETS  --  382  --  433   PROTIME  --   --  14.7  --    APTT  --   --  30.5*  --    HEPARIN ANTI-XA 0.10* 0.10* 0.10*  --    CREATININE  --  0.86  --  1.05*   EGFR  --  69.7  --  54.8*          Date   Time   Anti-Xa Current Rate (Unit/kg/hr) Bolus   (Units) Rate Change   (Unit/kg/hr) New Rate (Unit/kg/hr) Next   Anti-Xa Comments  Pump Check Daily   3/9/25 2251 0.10 0 2990 +12 12 0500 D/W  Antoinette Joseph RN   3/10/25 0528 0.10 0 1250 +2 14 3/10 1300 d/w June Campbell RN   3/10/25 1424 0.10 14 1250 +2 16 3/10 2130 D/W [place]                                                                                                                                                                                                            Pharmacy will continue to follow anti-Xa results and monitor for signs and symptoms of bleeding or thrombosis.      Thank you for the opportunity to consult on this patient.    Darlene Bazan, Pharm.D.  03/10/25  15:08 EDT

## 2025-03-10 NOTE — PLAN OF CARE
Goal Outcome Evaluation:  Plan of Care Reviewed With: patient, family

## 2025-03-10 NOTE — CONSULTS
[facility] Consult Note    Referring Provider: Kerley  Reason for Consultation: Chest pain    Patient Care Team:  Eddi Cheatham MD as PCP - General (Internal Medicine)    Chief complaint : Chest pain    Subjective:    History of present illness: This is a 79-year-old female patient who presents to the emergency room with chest discomfort described as a diffuse anterior pressure sensation associated with nausea and indigestion.  Twelve-lead electrocardiogram in the emergency room showed lateral ST segment depression.  Cardiac troponins were initially elevated with a downward trend.  The patient has known coronary artery disease and underwent a 5 vessel CABG (LIMA-LAD, SVG-OM1, SVG-OM 3, SVG-diagonal 1 and SVG-RCA) in January of this year at [facility] by Dr. Del Castillo.  The patient was originally seen at [facility] with chest discomfort and transferred to [facility] where diagnostic catheterization disclosed severe three-vessel coronary artery disease.  The patient's family insisted on transfer to [facility] as Dr. Del Castillo had performed bypass surgery on her son approximately 8-10 years ago.  The patient underwent uncomplicated CABG and was subsequently discharged to home.  She indicated that her chest discomfort had resolved for approximately 6 weeks only to recur recently.  She has a history of hypertension, type 2 diabetes mellitus and dyslipidemia.  She is known to have chronic left ventricular systolic heart failure with an ejection fraction of 35-40%, preoperatively.  proBNP was elevated.  Chest imaging was consistent with interstitial edema.    Review of Systems   Review of Systems   Constitutional: Negative for chills, diaphoresis, fever, malaise/fatigue, weight gain and weight loss.   HENT:  Negative for ear discharge, hearing loss, hoarse voice and nosebleeds.    Eyes:  Negative for discharge, double vision, pain and photophobia.   Cardiovascular:   Positive for chest pain and dyspnea on exertion. Negative for claudication, cyanosis, irregular heartbeat, leg swelling, near-syncope, orthopnea, palpitations, paroxysmal nocturnal dyspnea and syncope.   Respiratory:  Positive for shortness of breath. Negative for cough, hemoptysis, sputum production and wheezing.    Endocrine: Negative for cold intolerance, heat intolerance, polydipsia, polyphagia and polyuria.   Hematologic/Lymphatic: Negative for adenopathy and bleeding problem. Does not bruise/bleed easily.   Skin:  Negative for color change, flushing, itching and rash.   Musculoskeletal:  Negative for muscle cramps, muscle weakness, myalgias and stiffness.   Gastrointestinal:  Positive for nausea. Negative for abdominal pain, diarrhea, hematemesis, hematochezia and vomiting.   Genitourinary:  Negative for dysuria, frequency and nocturia.   Neurological:  Negative for focal weakness, loss of balance, numbness, paresthesias and seizures.   Psychiatric/Behavioral:  Negative for altered mental status, hallucinations and suicidal ideas.    Allergic/Immunologic: Negative for HIV exposure, hives and persistent infections.       History  Past Medical History:   Diagnosis Date    Anemia     Anxiety and depression     Arthritis     Cataracts, bilateral     Coronary artery disease     Delayed emergence from anesthesia     Diabetes mellitus     Heart attack     Hyperlipidemia     Hypertension     Nausea     Peripheral neuropathy     PONV (postoperative nausea and vomiting)     Skin melanoma     Wears glasses    ,   Past Surgical History:   Procedure Laterality Date    CARDIAC CATHETERIZATION      CATARACT EXTRACTION W/ INTRAOCULAR LENS IMPLANT Left 06/21/2023    Procedure: CATARACT PHACO EXTRACTION WITH INTRAOCULAR LENS IMPLANT LEFT;  Surgeon: Mina Hendrickson MD;  Location: [facility];  Service: Ophthalmology;  Laterality: Left;    CATARACT EXTRACTION W/ INTRAOCULAR LENS IMPLANT Right 07/07/2023    Procedure: CATARACT  PHACO EXTRACTION WITH INTRAOCULAR LENS IMPLANT RIGHT;  Surgeon: Mina Hendrickson MD;  Location: [facility] OR;  Service: Ophthalmology;  Laterality: Right;    CHOLECYSTECTOMY      COLONOSCOPY      CORONARY ARTERY BYPASS GRAFT N/A 1/28/2025    Procedure: MEDIAN STERNOTOMY, CORONARY ARTERY BYPASS GRAFTING X 5, UTILIZING THE LEFT INTERNAL MAMMARY ARTERY, ENDOSCOPIC VEIN HARVESTING OF THE LEFT SAPHENOUS VEIN, TRANSESOPHAGEAL ECHOCARDIOGRAM WITH ANESTHESIA;  Surgeon: Zeferino Del Castillo MD;  Location: [facility] OR;  Service: Cardiothoracic;  Laterality: N/A;    ENDOSCOPY      HYSTERECTOMY      SKIN CANCER EXCISION      TUBAL ABDOMINAL LIGATION     ,   Family History   Problem Relation Age of Onset    Diabetes Mother     Dementia Mother     Coronary artery disease Father     Stroke Son     Coronary artery disease Son    ,   Social History     Tobacco Use    Smoking status: Never    Smokeless tobacco: Never   Vaping Use    Vaping status: Never Used   Substance Use Topics    Alcohol use: Never    Drug use: Never   ,   Medications Prior to Admission   Medication Sig Dispense Refill Last Dose/Taking    alendronate (FOSAMAX) 70 MG tablet Take 1 tablet by mouth Every 7 (Seven) Days.       aspirin 325 MG tablet Take 1 tablet by mouth Daily. 30 tablet 6     atorvastatin (LIPITOR) 40 MG tablet Take 1 tablet by mouth Every Night. 90 tablet 3     empagliflozin (JARDIANCE) 10 MG tablet tablet Take 1 tablet by mouth Daily. 30 tablet 5     glipizide (GLUCOTROL) 10 MG tablet Take 2 tablets by mouth 2 (Two) Times a Day Before Meals.       metFORMIN (GLUCOPHAGE) 1000 MG tablet Take 1 tablet by mouth 2 (Two) Times a Day With Meals.       metoprolol tartrate (LOPRESSOR) 25 MG tablet Take 0.5 tablet by mouth Every 12 (Twelve) Hours. 30 tablet 5     multivitamin with minerals (Oncovite) tablet tablet Take 0.5 tablets by mouth Daily.       pioglitazone (ACTOS) 45 MG tablet Take 1 tablet by mouth Daily As Needed.       and Allergies:  Patient has no  "known allergies.    Objective:    Vital Sign Min/Max for last 24 hours  Temp  Min: 97.3 °F (36.3 °C)  Max: 97.3 °F (36.3 °C)   BP  Min: 130/81  Max: 153/84   Pulse  Min: 78  Max: 102   Resp  Min: 19  Max: 20   SpO2  Min: 92 %  Max: 96 %   No data recorded   Weight  Min: 49.9 kg (110 lb)  Max: 49.9 kg (110 lb)     Flowsheet Rows      Flowsheet Row First Filed Value   Admission Height 157.5 cm (62\") Documented at 03/09/2025 1829   Admission Weight 49.9 kg (110 lb) Documented at 03/09/2025 1829                 Physical Exam:   Vitals and nursing note reviewed.   Constitutional:       Appearance: Not in distress. Cachectic and frail. Chronically ill-appearing.   Neck:      Vascular: No JVR. JVD normal.   Pulmonary:      Effort: Pulmonary effort is normal.      Breath sounds: Normal breath sounds. No wheezing. No rhonchi. No rales.   Chest:      Chest wall: Not tender to palpatation.   Cardiovascular:      PMI at left midclavicular line. Normal rate. Regular rhythm. Normal S1. Normal S2.       Murmurs: There is no murmur.      No gallop.  No click. No rub.   Pulses:     Intact distal pulses.   Edema:     Peripheral edema absent.   Abdominal:      General: Bowel sounds are normal.      Palpations: Abdomen is soft.      Tenderness: There is no abdominal tenderness.   Musculoskeletal: Normal range of motion.         General: No tenderness. Skin:     General: Skin is warm and dry.   Neurological:      General: No focal deficit present.      Mental Status: Alert and oriented to person, place and time.         Results Review:   I reviewed the patient's new clinical results.  Results from last 7 days   Lab Units 03/10/25  0528 03/09/25  1844   WBC 10*3/mm3 9.91 10.36   HEMOGLOBIN g/dL 11.4* 12.7   HEMATOCRIT % 36.8 42.0   PLATELETS 10*3/mm3 382 433     Results from last 7 days   Lab Units 03/10/25  0528 03/09/25  1844   SODIUM mmol/L 140 137   POTASSIUM mmol/L 3.4* 4.2   CHLORIDE mmol/L 100 97*   CO2 mmol/L 19.3* 19.1*   BUN " mg/dL 22 25*   CREATININE mg/dL 0.86 1.05*   GLUCOSE mg/dL 197* 303*   CALCIUM mg/dL 9.1 9.4     Lab Results   Lab Value Date/Time    TROPONINT 199 (C) 03/09/2025 2036    TROPONINT 213 (C) 03/09/2025 1844     Results from last 7 days   Lab Units 03/10/25  0528   CHOLESTEROL mg/dL 177   TRIGLYCERIDES mg/dL 115   HDL CHOL mg/dL 62*   LDL CHOL mg/dL 95         Assessment/Plan:      NSTEMI (non-ST elevated myocardial infarction)    CAD s/p CABG x 5 1/28/2025    Hypertension    Hyperlipidemia    Type 2 diabetes mellitus      I suspect the patient has had early vein graft failure to 1 or more of the vein graft to the lateral wall.  I have recommended invasive coronary angiography with interventional standby.  The patient and her family request transfer back to [facility] for her procedure.    I have recommended changing her Lopressor to Coreg.  I have recommended starting Entresto.    I discussed the patient's findings and my recommendations with patient and family    Jose Alfredo Marx MD  03/10/25  11:24 EDT

## 2025-03-10 NOTE — CASE MANAGEMENT/SOCIAL WORK
Discharge Planning Assessment   Benny     Patient Name: Abena Washington  MRN: [id number]  Today's Date: 3/10/2025    Admit Date: 3/9/2025    Plan: pt resting in bed;plans home with daughter on d/c; [place] [address]  with daughter since heart sx in January;stated only have [facility] in area if needed; wants to wait and decide with dr if needed; no  service; cares for self at home and uses no dme but has walker if needed; confirmed primary Dr. Cheatham; stated having some depression/anxiety since bypass surgery and just beginning medication; cm will continue to follow   Discharge Needs Assessment       Row Name 03/10/25 1558       Living Environment    People in Home child(jeet), adult    Name(s) of People in Home [place] [address]  with daughter since heart sx in January    Current Living Arrangements home    Primary Care Provided by self    Provides Primary Care For no one, unable/limited ability to care for self    Family Caregiver if Needed child(jeet), adult    Quality of Family Relationships supportive    Able to Return to Prior Arrangements yes    Living Arrangement Comments plans home with daughter on d/c       Resource/Environmental Concerns    Resource/Environmental Concerns none    Transportation Concerns none       Transition Planning    Patient/Family Anticipates Transition to home with family    Patient/Family Anticipated Services at Transition home health care    Transportation Anticipated family or friend will provide       Discharge Needs Assessment    Readmission Within the Last 30 Days no previous admission in last 30 days    Equipment Currently Used at Home walker, standard  has walker if needed; not using right now    Concerns to be Addressed discharge planning    Anticipated Changes Related to Illness none    Equipment Needed After Discharge none    Outpatient/Agency/Support Group Needs homecare agency    Provided Post Acute Provider List?  Refused    Refused Provider List Comment stated only have [facility] in area if needed; wants to wait and decide with dr if needed                   Discharge Plan       Row Name 03/10/25 1601       Plan    Plan pt resting in bed;plans home with daughter on d/c; [place] [address]  with daughter since heart sx in January;stated only have [facility] in area if needed; wants to wait and decide with dr if needed; no  service; cares for self at home and uses no dme but has walker if needed; confirmed primary Dr. Cheatham; stated having some depression/anxiety since bypass surgery and just beginning medication; cm will continue to follow                  Selected Continued Care - Prior Encounters Includes continued care and service providers with selected services from prior encounters from 12/9/2024 to 3/10/2025      Discharged on 2/5/2025 Admission date: 1/28/2025 - Discharge disposition: Home-Health Care c      Home Medical Care       Service Provider Services Address Phone Fax Patient Preferred    [facility] [address] [place] [id number] [phone] --                             Demographic Summary       Row Name 03/10/25 1557       General Information    Admission Type inpatient    Arrived From emergency department    Referral Source admission list    Reason for Consult discharge planning    Preferred Language English       Contact Information    Permission Granted to Share Info With family/designee  teresa Pham in room                   Functional Status       Row Name 03/10/25 1558       Functional Status    Usual Activity Tolerance moderate    Current Activity Tolerance moderate       Functional Status, IADL    Medications assistive person    Meal Preparation independent;assistive person    Housekeeping assistive person    Laundry assistive person    Shopping assistive person    If for any reason you need help with day-to-day  activities such as bathing, preparing meals, shopping, managing finances, etc., do you get the help you need? I get all the help I need       Mental Status    General Appearance WDL WDL       Mental Status Summary    Recent Changes in Mental Status/Cognitive Functioning no changes       Employment/    Employment Status unemployed                      Vero Velez, RN

## 2025-03-10 NOTE — PROGRESS NOTES
[facility]      Name:  Abena Washington   Age:  79 y.o.  Sex:  female  :  1945  MRN:  [id number]   Visit Number:  [id number]    Admission Date:  3/9/2025  Date of Discharge:  3/10/2025  Primary Care Physician:  Eddi Cheatham MD        Problem List:     Active Hospital Problems    Diagnosis  POA    **NSTEMI (non-ST elevated myocardial infarction) [I21.4]  Yes    CAD s/p CABG x 5 2025 [I25.10]  Yes    Hypertension [I10]  Yes    Hyperlipidemia [E78.5]  Yes    Type 2 diabetes mellitus [E11.9]  Yes      Resolved Hospital Problems   No resolved problems to display.     Presenting Problem:    Chief Complaint   Patient presents with    Chest Pain      Consults:     Consulting Physician(s)         Provider   Role Specialty     Jose Alfredo Marx MD      Consulting Physician Cardiology            History of presenting illness/Hospital Course:    Abena Washington is a 79-year-old female history significant for hypertension, hyperlipidemia and type 2 diabetes.  Of note patient also had CABG x 6 on 2025.  She presented to [facility] ED with concern of ongoing chest pain for the past 3 days.  Patient reports a midsternal chest pressure with associated belching.  Patient reports that pain has been intermittent with no alleviating factors.  Her children begged her to finally come to the emergency room on 3/2025.  Patient denies nausea/vomiting and diaphoresis.  She received nitroglycerin and at the time of my exam is chest pain-free. Of note, patient reports that her Plavix was sent to her mail order pharmacy and never delivered.  So patient has only been taking aspirin monotherapy and has not been taking Plavix since her CABG. CT of the chest showed no PE.  Does show findings consistent with cardiomegaly, pleural effusions, and interlobular septal fluid consistent with volume overload. Troponins down trended to 213-199. Cardiologist on-call, Dr. Arshad contacted.   Recommended initiation of heparin drip and hospitalist contacted to admit.    Inpatient general floor admission 3/9/2025 with suspected NSTEMI, acute exacerbation of heart failure reduced ejection fraction, chest pain with elevated troponins, history recent CABG.    Transfer to [facility] for cardiac catheterization with CTS standby.    Chest pain, resolved  Elevated Troponin  CAD s/p CABG, 01/28/2025  Transfer to [facility] for cardiac catheterization with interventional standby.  Troponins down trended; EKG notable for lateral ST depression  Cardiology consulted and appreciate recommendation.  Dr. Marx recommended transfer to [facility].  Entresto started.  Lopressor changed to Coreg.  Heparin drip per cardiology.  Echocardiogram EF 34.7%, grade 3 diastolic dysfunction.  Currently chest pain-free  Continue aspirin and Plavix *patient never received her Plavix that was prescribed to mail order pharmacy post CABG  Continue Lipitor 40 mg  HFrEF with acute exacerbation  Received IV Lasix 40 mg in the ER  Continue with IV Lasix daily  Currently stable on room air  Strict I's and O's, daily weight    Chronic: History of CABG, heart failure reduced ejection fraction, type 2 diabetes, hypertension, hyperlipidemia, anxiety and depression  Subcutaneous insulin protocol.  Hyperglycemia noted with A1c 5.6%.  Continue other home medications.      I have reviewed the copied text and it is accurate as of 3/10/2025     Vital Signs:    Temp:  [97.3 °F (36.3 °C)] 97.3 °F (36.3 °C)  Heart Rate:  [] 70  Resp:  [19-20] 20  BP: (122-153)/(67-92) 122/68    Physical Exam:    General Appearance:  Alert and cooperative.  Elderly.   Head:  Atraumatic and normocephalic.   Eyes: Conjunctivae and sclerae normal, no icterus. No pallor.   Ears:  Ears with no abnormalities noted.   Throat: No oral lesions, no thrush, oral mucosa moist.   Neck: Supple, trachea midline, no thyromegaly.   Back:    No kyphoscoliosis present. No tenderness to palpation.   Lungs:   Breath sounds heard bilaterally equally.  No crackles or wheezing. No Pleural rub or bronchial breathing.   Heart:  Normal S1 and S2, no murmur, no gallop, no rub. No JVD.   Abdomen:   Normal bowel sounds, no masses, no organomegaly. Soft, nontender, nondistended, no rebound tenderness.   Extremities: Supple, no edema, no cyanosis, no clubbing.   Pulses: Pulses palpable bilaterally.   Skin: Warm.   Neurologic: Alert and oriented x 3. No facial asymmetry. Moves all four limbs. No tremors.     Pertinent Lab Results:     Results from last 7 days   Lab Units 03/10/25  0528 03/09/25  1844   SODIUM mmol/L 140 137   POTASSIUM mmol/L 3.4* 4.2   CHLORIDE mmol/L 100 97*   CO2 mmol/L 19.3* 19.1*   BUN mg/dL 22 25*   CREATININE mg/dL 0.86 1.05*   CALCIUM mg/dL 9.1 9.4   BILIRUBIN mg/dL  --  0.3   ALK PHOS U/L  --  97   ALT (SGPT) U/L  --  25   AST (SGOT) U/L  --  34*   GLUCOSE mg/dL 197* 303*     Results from last 7 days   Lab Units 03/10/25  0528 03/09/25  1844   WBC 10*3/mm3 9.91 10.36   HEMOGLOBIN g/dL 11.4* 12.7   HEMATOCRIT % 36.8 42.0   PLATELETS 10*3/mm3 382 433     Results from last 7 days   Lab Units 03/09/25  2251   INR  1.10     Results from last 7 days   Lab Units 03/09/25  2036 03/09/25  1844   HSTROP T ng/L 199* 213*     Results from last 7 days   Lab Units 03/09/25  1844   PROBNP pg/mL 20,136.0*                       Pertinent Radiology Results:    Imaging Results (All)       Procedure Component Value Units Date/Time    XR Chest 1 View [[id number]] Collected: 03/10/25 0907     Updated: 03/10/25 0915    Narrative:      PROCEDURE: XR CHEST 1 VW-        HISTORY: Chest pressure, recent CABG, chest pain     COMPARISON: February 5, 2025.     FINDINGS: The patient is rotated to the left. The heart is mildly  enlarged, but stable. The patient is status post median sternotomy for  CABG. Small bilateral pleural effusions are seen, worsened on the  right.  There is worsening left basilar atelectasis or infiltrate. There is  worsening interstitial disease. There is no pneumothorax. There are no  acute osseous abnormalities.       Impression:      Findings compatible with fluid overload/worsening CHF.  Continued follow-up is recommended.        Images were reviewed, interpreted, and dictated by Dr. Karina Phelps MD  Transcribed by Christina Tavarez PA-C.     This report was signed and finalized on 3/10/2025 9:13 AM by Karina Phelps MD.       CT Angiogram Chest Pulmonary Embolism [[id number]] Collected: 03/09/25 2136     Updated: 03/09/25 2138    Narrative:      FINAL REPORT    TECHNIQUE:  null    CLINICAL HISTORY:  mid sternal Chest pain, recent CABG    COMPARISON:  null    FINDINGS:  CT angiography chest with contrast. 3D Postprocessing.    Comparison: None    Findings:    No pulmonary embolism.    No thoracic aortic aneurysm.    Atheromatous aortic and coronary arterial calcifications.    Mild-to-moderate cardiomegaly. CABG changes.    Pulmonary vascular prominence with interlobular septal fluid.    Mild bibasilar atelectasis.    No focal infiltrates or masses.    No pneumothorax    Moderate right and small left effusions.    No adenopathy.    No acute upper abdominal pathology.    Bilateral low-density adrenal nodules consistent with adenomas, 2.8 cm on the right and 1.8 cm on the left.    No acute chest wall pathology.    No acute fracture or dislocation. Several remote left rib fractures.    Sternotomy.    Degenerative changes.      Impression:      IMPRESSION:    No pulmonary emboli.    Combination of findings including cardiomegaly, pleural effusions, and interlobular septal fluid consistent with volume overload without william alveolar edema.    Bilateral low-density adrenal nodules consistent with adenomas    Authenticated and Electronically Signed by Alycia Perry MD  on 03/09/2025 09:36:16 PM            Echo:    Results for orders placed during the  hospital encounter of 03/09/25    Adult Transthoracic Echo Complete w/ Color, Spectral and Contrast if necessary per protocol    Interpretation Summary    Left ventricular systolic function is moderately decreased. Calculated left ventricular EF = 34.7% Left ventricular ejection fraction appears to be 31 - 35%.    The left ventricular cavity is mild to moderately dilated.    Left ventricular diastolic function is consistent with (grade III w/high LAP) reversible restrictive pattern.    Condition on Discharge:      Stable.    Code status during the hospital stay:    Code Status and Medical Interventions: CPR (Attempt to Resuscitate); Full Support   Ordered at: 03/09/25 2221     Code Status (Patient has no pulse and is not breathing):    CPR (Attempt to Resuscitate)     Medical Interventions (Patient has pulse or is breathing):    Full Support     Discharge Disposition:        Discharge Medications:       Discharge Medications        ASK your doctor about these medications        Instructions Start Date   alendronate 70 MG tablet  Commonly known as: FOSAMAX   70 mg, Every 7 Days      aspirin 325 MG tablet   325 mg, Oral, Daily      atorvastatin 40 MG tablet  Commonly known as: LIPITOR   40 mg, Oral, Nightly      empagliflozin 10 MG tablet tablet  Commonly known as: JARDIANCE   10 mg, Oral, Daily      glipizide 10 MG tablet  Commonly known as: GLUCOTROL   20 mg, 2 Times Daily Before Meals      metFORMIN 1000 MG tablet  Commonly known as: GLUCOPHAGE   1,000 mg, 2 Times Daily With Meals      metoprolol tartrate 25 MG tablet  Commonly known as: LOPRESSOR   Take 0.5 tablet by mouth Every 12 (Twelve) Hours.      Oncovite tablet tablet  Generic drug: multivitamin with minerals   0.5 tablets, Daily      pioglitazone 45 MG tablet  Commonly known as: ACTOS   45 mg, Daily PRN             Discharge Diet:       Activity at Discharge:       Follow-up Appointments:      Future Appointments   Date Time Provider Department Center    3/12/2025 12:30 PM Ana Maria Zarco, [facility]     Test Results Pending at Discharge:    Pending Results       Procedure [Order ID] Specimen - Date/Time    Heparin Anti-Xa [[id number]]     Specimen: Blood                  Simeon Davis KerleyDO  03/10/25  16:40 EDT    Time: I spent 35 minutes on this discharge activity which included: face-to-face encounter with the patient, reviewing the data in the system, coordination of the care with the nursing staff as well as consultants, documentation, and entering orders.     Dictated utilizing Dragon dictation.

## 2025-03-10 NOTE — H&P
[facility]      Name:  Abena Washington   Age:  79 y.o.  Sex:  female  :  1945  MRN:  [id number]   Visit Number:  [id number]  Admission Date:  3/9/2025  Date Of Service:  25  Primary Care Physician:  Eddi Cheatham MD    Chief Complaint:     Chest pain    History Of Presenting Illness:      Patient is a 79-year-old female history significant for hypertension, hyperlipidemia and type 2 diabetes.  Of note patient also had CABG x 6 on 2025.  Antonio presents to the emergency room with complaints of ongoing chest pain for the past 3 days.  Patient reports a midsternal chest pressure with associated belching.  Patient reports that pain has been intermittent with no alleviating factors.  Her children begged her to finally come to the emergency room tonight.  Patient denies nausea/vomiting and diaphoresis.  She received nitroglycerin and at the time of my exam is chest pain-free.  Of note, patient reports that her Plavix was sent to her mail order pharmacy and never delivered.  So patient has only been taking aspirin monotherapy and has not been taking Plavix since her CABG.  CT of the chest showed no PE.  Does show findings consistent with cardiomegaly, pleural effusions, and interlobular septal fluid consistent with volume overload.  Troponins down trended to 213-199  Cardiologist on-call, Dr. Arshad contacted.  Recommended initiation of heparin drip and hospitalist contacted to admit.    Review Of Systems:    All systems were reviewed and negative except as mentioned in history of presenting illness, assessment and plan.    Past Medical History: Patient  has a past medical history of Anemia, Anxiety and depression, Arthritis, Cataracts, bilateral, Coronary artery disease, Delayed emergence from anesthesia, Diabetes mellitus, Heart attack, Hyperlipidemia, Hypertension, Nausea, Peripheral neuropathy, PONV (postoperative nausea and vomiting), Skin melanoma,  and Wears glasses.    Past Surgical History: Patient  has a past surgical history that includes Cholecystectomy; Hysterectomy; Tubal ligation; Colonoscopy; Esophagogastroduodenoscopy; Cataract extraction w/ intraocular lens implant (Left, 06/21/2023); Cataract extraction w/ intraocular lens implant (Right, 07/07/2023); Cardiac catheterization; Skin cancer excision; and Coronary artery bypass graft (N/A, 1/28/2025).    Social History: Patient  reports that she has never smoked. She has never used smokeless tobacco. She reports that she does not drink alcohol and does not use drugs.    Family History:  Patient's family history has been reviewed and found to be noncontributory.     Allergies:      Patient has no known allergies.    Home Medications:    Prior to Admission Medications       Prescriptions Last Dose Informant Patient Reported? Taking?    alendronate (FOSAMAX) 70 MG tablet  Self, Pharmacy Yes No    Take 1 tablet by mouth Every 7 (Seven) Days.    aspirin 325 MG tablet   No No    Take 1 tablet by mouth Daily.    atorvastatin (LIPITOR) 40 MG tablet   No No    Take 1 tablet by mouth Every Night.    empagliflozin (JARDIANCE) 10 MG tablet tablet   No No    Take 1 tablet by mouth Daily.    glipizide (GLUCOTROL) 10 MG tablet  Self, Pharmacy Yes No    Take 2 tablets by mouth 2 (Two) Times a Day Before Meals.    metFORMIN (GLUCOPHAGE) 1000 MG tablet  Self, Pharmacy Yes No    Take 1 tablet by mouth 2 (Two) Times a Day With Meals.    metoprolol tartrate (LOPRESSOR) 25 MG tablet   No No    Take 0.5 tablet by mouth Every 12 (Twelve) Hours.    multivitamin with minerals (Oncovite) tablet tablet  Self, Pharmacy Yes No    Take 0.5 tablets by mouth Daily.    pioglitazone (ACTOS) 45 MG tablet  Self, Pharmacy Yes No    Take 1 tablet by mouth Daily As Needed.          ED Medications:    Medications   heparin (porcine) injection 2,990 Units (has no administration in time range)   heparin 71329 units/250 ml (100 units/ml) in D5W  "(has no administration in time range)   heparin (porcine) injection 2,500 Units (has no administration in time range)   heparin (porcine) injection 1,250 Units (has no administration in time range)   Pharmacy to Dose Heparin (has no administration in time range)   furosemide (LASIX) injection 40 mg (has no administration in time range)   nitroglycerin (NITROSTAT) ointment 0.5 inch (0.5 inches Topical Given 3/9/25 1904)   iopamidol (ISOVUE-300) 61 % injection 85 mL (85 mL Intravenous Given 3/9/25 2010)     Vital Signs:  Temp:  [97.3 °F (36.3 °C)] 97.3 °F (36.3 °C)  Heart Rate:  [] 93  Resp:  [19-20] 19  BP: (139-148)/(82-92) 146/83        03/09/25  1829   Weight: 49.9 kg (110 lb)     Body mass index is 20.12 kg/m².    Physical Exam:     Most recent vital Signs: /83 (BP Location: Left arm, Patient Position: Lying)   Pulse 93   Temp 97.3 °F (36.3 °C) (Axillary)   Resp 19   Ht 157.5 cm (62\")   Wt 49.9 kg (110 lb)   SpO2 92%   BMI 20.12 kg/m²     Physical Exam  Vitals reviewed.   Constitutional:       General: She is not in acute distress.     Appearance: She is normal weight.   HENT:      Head: Normocephalic and atraumatic.      Right Ear: External ear normal.      Left Ear: External ear normal.      Mouth/Throat:      Mouth: Mucous membranes are moist.      Pharynx: Oropharynx is clear.   Eyes:      Extraocular Movements: Extraocular movements intact.      Conjunctiva/sclera: Conjunctivae normal.   Cardiovascular:      Rate and Rhythm: Normal rate and regular rhythm.      Pulses: Normal pulses.      Heart sounds: Normal heart sounds.   Pulmonary:      Effort: Pulmonary effort is normal.      Breath sounds: Normal breath sounds.   Abdominal:      General: Bowel sounds are normal.      Palpations: Abdomen is soft.   Musculoskeletal:      Right lower leg: No edema.      Left lower leg: No edema.   Skin:     General: Skin is warm and dry.   Neurological:      General: No focal deficit present.      " "Mental Status: She is alert and oriented to person, place, and time.         Laboratory data:    I have reviewed the labs done in the emergency room.    Results from last 7 days   Lab Units 03/09/25  1844   SODIUM mmol/L 137   POTASSIUM mmol/L 4.2   CHLORIDE mmol/L 97*   CO2 mmol/L 19.1*   BUN mg/dL 25*   CREATININE mg/dL 1.05*   CALCIUM mg/dL 9.4   BILIRUBIN mg/dL 0.3   ALK PHOS U/L 97   ALT (SGPT) U/L 25   AST (SGOT) U/L 34*   GLUCOSE mg/dL 303*     Results from last 7 days   Lab Units 03/09/25  1844   WBC 10*3/mm3 10.36   HEMOGLOBIN g/dL 12.7   HEMATOCRIT % 42.0   PLATELETS 10*3/mm3 433         Results from last 7 days   Lab Units 03/09/25 2036 03/09/25  1844   HSTROP T ng/L 199* 213*     Results from last 7 days   Lab Units 03/09/25  1844   PROBNP pg/mL 20,136.0*                       Invalid input(s): \"USDES\", \"NITRITITE\", \"BACT\", \"EP\"    Pain Management Panel          Latest Ref Rng & Units 1/27/2025   Pain Management Panel   Amphetamine, Urine Qual Negative Negative    Barbiturates Screen, Urine Negative Negative    Benzodiazepine Screen, Urine Negative Negative    Buprenorphine, Screen, Urine Negative Negative    Cocaine Screen, Urine Negative Negative    Fentanyl, Urine Negative Negative    Methadone Screen , Urine Negative Negative    Methamphetamine, Ur Negative Negative        EKG:      EKG personally reviewed, atrial tachycardia with a rate of 103 bpm.  ST depressions in lateral leads, no ST elevations.    Radiology:    CT Angiogram Chest Pulmonary Embolism  Result Date: 3/9/2025  FINAL REPORT TECHNIQUE: null CLINICAL HISTORY: mid sternal Chest pain, recent CABG COMPARISON: null FINDINGS: CT angiography chest with contrast. 3D Postprocessing. Comparison: None Findings: No pulmonary embolism. No thoracic aortic aneurysm. Atheromatous aortic and coronary arterial calcifications. Mild-to-moderate cardiomegaly. CABG changes. Pulmonary vascular prominence with interlobular septal fluid. Mild bibasilar " atelectasis. No focal infiltrates or masses. No pneumothorax Moderate right and small left effusions. No adenopathy. No acute upper abdominal pathology. Bilateral low-density adrenal nodules consistent with adenomas, 2.8 cm on the right and 1.8 cm on the left. No acute chest wall pathology. No acute fracture or dislocation. Several remote left rib fractures. Sternotomy. Degenerative changes.     IMPRESSION: No pulmonary emboli. Combination of findings including cardiomegaly, pleural effusions, and interlobular septal fluid consistent with volume overload without william alveolar edema. Bilateral low-density adrenal nodules consistent with adenomas Authenticated and Electronically Signed by Alycia Perry MD on 03/09/2025 09:36:16 PM      Assessment:    HFrEF with acute exacerbation  Chest pain  Elevated Troponin  CAD s/p CABG, 01/28/2025  Hypertension  Hyperlipidemia  Type 2 diabetes  Anxiety/depression    Plan:    HFrEF with acute exacerbation  Received IV Lasix 40 mg in the ER  Continue with IV Lasix daily  Currently stable on room air  Strict I's and O's, daily weight  Chest pain  Elevated Troponin  CAD s/p CABG, 01/28/2025  Troponins down trended; EKG notable for lateral ST depression  Cardiology consulted and appreciate recommendation  Heparin drip per cardiology  Repeat echo pending  A1c and lipid panel with a.m. labs  Currently chest pain-free  Continue aspirin and Plavix *patient never received her Plavix that was prescribed to mail order pharmacy post CABG  Continue Lipitor 40 mg    Further orders as clinical course dictate    Risk Assessment: High  DVT Prophylaxis: Heparin drip  Code Status: Full  Diet: Cardiac/Diabetic             Alban Hernandez DO  03/09/25  22:22 EDT    Dictated utilizing Dragon dictation.

## 2025-03-11 LAB
GLUCOSE BLDC GLUCOMTR-MCNC: 353 MG/DL (ref 70–130)
GLUCOSE BLDC GLUCOMTR-MCNC: 363 MG/DL (ref 70–130)
GLUCOSE BLDC GLUCOMTR-MCNC: 384 MG/DL (ref 70–130)
UFH PPP CHRO-ACNC: 0.23 IU/ML (ref 0.3–0.7)
UFH PPP CHRO-ACNC: 0.26 IU/ML (ref 0.3–0.7)
UFH PPP CHRO-ACNC: 0.33 IU/ML (ref 0.3–0.7)

## 2025-03-11 PROCEDURE — 85520 HEPARIN ASSAY: CPT | Performed by: INTERNAL MEDICINE

## 2025-03-11 PROCEDURE — 63710000001 INSULIN LISPRO (HUMAN) PER 5 UNITS: Performed by: STUDENT IN AN ORGANIZED HEALTH CARE EDUCATION/TRAINING PROGRAM

## 2025-03-11 PROCEDURE — 99232 SBSQ HOSP IP/OBS MODERATE 35: CPT | Performed by: INTERNAL MEDICINE

## 2025-03-11 PROCEDURE — 82948 REAGENT STRIP/BLOOD GLUCOSE: CPT

## 2025-03-11 PROCEDURE — 85520 HEPARIN ASSAY: CPT | Performed by: STUDENT IN AN ORGANIZED HEALTH CARE EDUCATION/TRAINING PROGRAM

## 2025-03-11 PROCEDURE — 25010000002 FUROSEMIDE PER 20 MG: Performed by: INTERNAL MEDICINE

## 2025-03-11 PROCEDURE — 25010000002 HEPARIN (PORCINE) PER 1000 UNITS: Performed by: STUDENT IN AN ORGANIZED HEALTH CARE EDUCATION/TRAINING PROGRAM

## 2025-03-11 RX ADMIN — Medication 10 ML: at 20:40

## 2025-03-11 RX ADMIN — INSULIN LISPRO 8 UNITS: 100 INJECTION, SOLUTION INTRAVENOUS; SUBCUTANEOUS at 20:40

## 2025-03-11 RX ADMIN — CARVEDILOL 3.12 MG: 6.25 TABLET, FILM COATED ORAL at 17:38

## 2025-03-11 RX ADMIN — HEPARIN SODIUM 18 UNITS/KG/HR: 10000 INJECTION, SOLUTION INTRAVENOUS at 09:06

## 2025-03-11 RX ADMIN — FUROSEMIDE 40 MG: 10 INJECTION, SOLUTION INTRAMUSCULAR; INTRAVENOUS at 09:14

## 2025-03-11 RX ADMIN — INSULIN LISPRO 8 UNITS: 100 INJECTION, SOLUTION INTRAVENOUS; SUBCUTANEOUS at 12:12

## 2025-03-11 RX ADMIN — ATORVASTATIN CALCIUM 40 MG: 40 TABLET, FILM COATED ORAL at 20:40

## 2025-03-11 RX ADMIN — SACUBITRIL AND VALSARTAN 1 TABLET: 24; 26 TABLET, FILM COATED ORAL at 20:40

## 2025-03-11 RX ADMIN — ALPRAZOLAM 0.25 MG: 0.25 TABLET ORAL at 00:19

## 2025-03-11 RX ADMIN — CLOPIDOGREL BISULFATE 75 MG: 75 TABLET ORAL at 09:15

## 2025-03-11 RX ADMIN — SACUBITRIL AND VALSARTAN 1 TABLET: 24; 26 TABLET, FILM COATED ORAL at 09:15

## 2025-03-11 RX ADMIN — Medication 10 ML: at 09:15

## 2025-03-11 RX ADMIN — CARVEDILOL 3.12 MG: 6.25 TABLET, FILM COATED ORAL at 09:15

## 2025-03-11 RX ADMIN — ASPIRIN 81 MG: 81 TABLET, COATED ORAL at 09:15

## 2025-03-11 RX ADMIN — INSULIN LISPRO 8 UNITS: 100 INJECTION, SOLUTION INTRAVENOUS; SUBCUTANEOUS at 17:37

## 2025-03-11 RX ADMIN — FUROSEMIDE 40 MG: 10 INJECTION, SOLUTION INTRAMUSCULAR; INTRAVENOUS at 20:40

## 2025-03-11 RX ADMIN — EMPAGLIFLOZIN 10 MG: 10 TABLET, FILM COATED ORAL at 09:15

## 2025-03-11 NOTE — CASE MANAGEMENT/SOCIAL WORK
Continued Stay Note  [facility]     Patient Name: Abena Washington  MRN: [id number]  Today's Date: 3/11/2025    Admit Date: 3/9/2025             Row Name 03/11/25 1448       Plan per pt and family; transferring to [place] when bed available                        Discharge Codes    No documentation.                       Vero Velez RN

## 2025-03-11 NOTE — PLAN OF CARE
Goal Outcome Evaluation:           Progress: no change  Outcome Evaluation: vss. family to remain at bedside. heparin gtt cont per mar. awaiting bed at  [facility].

## 2025-03-11 NOTE — PROGRESS NOTES
[facility]    PROGRESS NOTE    Name:  Aebna Washington   Age:  79 y.o.  Sex:  female  :  1945  MRN:  [id number]   Visit Number:  [id number]  Admission Date:  3/9/2025  Date Of Service:  25  Primary Care Physician:  Eddi Cheatham MD     LOS: 2 days :    Chief Complaint:      Chest discomfort    Subjective:    3/11: stable today no new complaints. Mild left wrist pain. Mild insomnia last night relieved by xanax.    History of presenting illness     Abena Washington is a 79-year-old female history significant for hypertension, hyperlipidemia and type 2 diabetes.  Of note patient also had CABG x 6 on 2025.  She presented to [facility] ED with concern of ongoing chest pain for the past 3 days.  Patient reports a midsternal chest pressure with associated belching.  Patient reports that pain has been intermittent with no alleviating factors.  Her children begged her to finally come to the emergency room on 3/2025.  Patient denies nausea/vomiting and diaphoresis.  She received nitroglycerin and at the time of my exam is chest pain-free. Of note, patient reports that her Plavix was sent to her mail order pharmacy and never delivered.  So patient has only been taking aspirin monotherapy and has not been taking Plavix since her CABG. CT of the chest showed no PE.  Does show findings consistent with cardiomegaly, pleural effusions, and interlobular septal fluid consistent with volume overload. Troponins down trended to 213-199. Cardiologist on-call, Dr. Arshad contacted.  Recommended initiation of heparin drip and hospitalist contacted to admit.     Inpatient general floor admission 3/9/2025 with suspected NSTEMI, acute exacerbation of heart failure reduced ejection fraction, chest pain with elevated troponins, history recent CABG.     Transfer to [facility] for cardiac catheterization with CTS standby.        Chronic: History of CABG, heart failure reduced ejection  fraction, type 2 diabetes, hypertension, hyperlipidemia, anxiety and depression  Subcutaneous insulin protocol.  Hyperglycemia noted with A1c 5.6%.  Continue other home medications.      Edited by: Kermit Rojas DO at 3/11/2025 1506     Review of Systems:     All systems were reviewed and negative except as mentioned in subjective, assessment and plan.    Vital Signs:    Temp:  [96.8 °F (36 °C)-98.4 °F (36.9 °C)] 98.4 °F (36.9 °C)  Heart Rate:  [66-80] 80  Resp:  [16-21] 21  BP: ()/(48-68) 94/48    Intake and output:    I/O last 3 completed shifts:  In: -   Out: 2600 [Urine:2600]  I/O this shift:  In: 600 [P.O.:600]  Out: 700 [Urine:700]    Physical Examination:    Constitutional: No acute distress, awake, alert  HENT: NCAT, mucous membranes moist  Respiratory: Clear to auscultation bilaterally, respiratory effort normal   Cardiovascular: RRR, no murmurs, rubs, or gallops  Gastrointestinal: Positive bowel sounds, soft, nontender, nondistended  Musculoskeletal: No bilateral ankle edema  Psychiatric: Appropriate affect, cooperative  Neurologic: Oriented x 3, speech clear  Skin: No rashes  Edited by: Kermit Rojas DO at 3/11/2025 1506     Laboratory results:    Results from last 7 days   Lab Units 03/10/25  0528 03/09/25  1844   SODIUM mmol/L 140 137   POTASSIUM mmol/L 3.4* 4.2   CHLORIDE mmol/L 100 97*   CO2 mmol/L 19.3* 19.1*   BUN mg/dL 22 25*   CREATININE mg/dL 0.86 1.05*   CALCIUM mg/dL 9.1 9.4   BILIRUBIN mg/dL  --  0.3   ALK PHOS U/L  --  97   ALT (SGPT) U/L  --  25   AST (SGOT) U/L  --  34*   GLUCOSE mg/dL 197* 303*     Results from last 7 days   Lab Units 03/10/25  0528 03/09/25  1844   WBC 10*3/mm3 9.91 10.36   HEMOGLOBIN g/dL 11.4* 12.7   HEMATOCRIT % 36.8 42.0   PLATELETS 10*3/mm3 382 433     Results from last 7 days   Lab Units 03/09/25  2251   INR  1.10     Results from last 7 days   Lab Units 03/09/25 2036 03/09/25  1844   HSTROP T ng/L 199* 213*         Recent Labs      01/28/25  2100 01/29/25  0032 01/29/25  0652   PHART 7.265* 7.256* 7.334*   IDY8GCK 58.5* 58.8* 47.4*   PO2ART 103.0 109.0* 129.0*   ULI0BMX 26.5* 26.1* 25.2   BASEEXCESS -0.9* -1.4* -0.8*      I have reviewed the patient's laboratory results.    Radiology results:    XR Chest 1 View  Result Date: 3/10/2025  PROCEDURE: XR CHEST 1 VW-    HISTORY: Chest pressure, recent CABG, chest pain  COMPARISON: February 5, 2025.  FINDINGS: The patient is rotated to the left. The heart is mildly enlarged, but stable. The patient is status post median sternotomy for CABG. Small bilateral pleural effusions are seen, worsened on the right. There is worsening left basilar atelectasis or infiltrate. There is worsening interstitial disease. There is no pneumothorax. There are no acute osseous abnormalities.      Impression: Findings compatible with fluid overload/worsening CHF. Continued follow-up is recommended.   Images were reviewed, interpreted, and dictated by Dr. Karina Phelps MD Transcribed by Christina Tavarez PA-C.  This report was signed and finalized on 3/10/2025 9:13 AM by Karina Phelps MD.      CT Angiogram Chest Pulmonary Embolism  Result Date: 3/9/2025  FINAL REPORT TECHNIQUE: null CLINICAL HISTORY: mid sternal Chest pain, recent CABG COMPARISON: null FINDINGS: CT angiography chest with contrast. 3D Postprocessing. Comparison: None Findings: No pulmonary embolism. No thoracic aortic aneurysm. Atheromatous aortic and coronary arterial calcifications. Mild-to-moderate cardiomegaly. CABG changes. Pulmonary vascular prominence with interlobular septal fluid. Mild bibasilar atelectasis. No focal infiltrates or masses. No pneumothorax Moderate right and small left effusions. No adenopathy. No acute upper abdominal pathology. Bilateral low-density adrenal nodules consistent with adenomas, 2.8 cm on the right and 1.8 cm on the left. No acute chest wall pathology. No acute fracture or dislocation. Several remote left rib fractures.  Sternotomy. Degenerative changes.     Impression: IMPRESSION: No pulmonary emboli. Combination of findings including cardiomegaly, pleural effusions, and interlobular septal fluid consistent with volume overload without william alveolar edema. Bilateral low-density adrenal nodules consistent with adenomas Authenticated and Electronically Signed by Alycia Perry MD on 03/09/2025 09:36:16 PM    I have reviewed the patient's radiology reports.    Medication Review:     I have reviewed the patient's active and prn medications.     Problem List:      NSTEMI (non-ST elevated myocardial infarction)    CAD s/p CABG x 5 1/28/2025    Hypertension    Hyperlipidemia    Type 2 diabetes mellitus      Assessment/Plan:      Chest pain, resolved  Elevated Troponin  CAD s/p CABG, 01/28/2025  Transfer to [facility] for cardiac catheterization with interventional standby.  Troponins down trended; EKG notable for lateral ST depression  Cardiology consulted and appreciate recommendation.  Dr. Marx recommended transfer to [facility].  Entresto started.  Lopressor changed to Coreg.  Heparin drip per cardiology.  Echocardiogram EF 34.7%, grade 3 diastolic dysfunction.  Currently chest pain-free  Continue aspirin and Plavix *patient never received her Plavix that was prescribed to mail order pharmacy post CABG  Continue Lipitor 40 mg  HFrEF with acute exacerbation  Received IV Lasix 40 mg in the ER  Continue with IV Lasix daily  Currently stable on room air  Strict I's and O's, daily weight     Chronic: History of CABG, heart failure reduced ejection fraction, type 2 diabetes, hypertension, hyperlipidemia, anxiety and depression  Subcutaneous insulin protocol.  Hyperglycemia noted with A1c 5.6%.  Continue other home medications.    DVT Prophylaxis: heparin gtt  Code Status:  full  Diet:  cardiac  Disposition: [facility] transfer pending      Edited by: Kermit Rojas DO at 3/11/2025 7171           Kermit RUSSELL  DO Crystal  03/11/25  15:08 EDT    Dictated utilizing Dragon dictation.

## 2025-03-11 NOTE — THERAPY EVALUATION
PT evaluation orders received. PT eval held today secondary to nsg as pt is currently on heparin drip and not appropriate for eval at this time. PT will follow up tomorrow.

## 2025-03-12 ENCOUNTER — HOSPITAL ENCOUNTER (INPATIENT)
Facility: HOSPITAL | Age: 80
LOS: 1 days | Discharge: HOME | End: 2025-03-14
Attending: INTERNAL MEDICINE | Admitting: INTERNAL MEDICINE
Payer: MEDICARE

## 2025-03-12 VITALS
SYSTOLIC BLOOD PRESSURE: 110 MMHG | DIASTOLIC BLOOD PRESSURE: 63 MMHG | TEMPERATURE: 96.8 F | HEIGHT: 62 IN | OXYGEN SATURATION: 94 % | RESPIRATION RATE: 20 BRPM | WEIGHT: 110 LBS | HEART RATE: 77 BPM | BODY MASS INDEX: 20.24 KG/M2

## 2025-03-12 DIAGNOSIS — I5A NONISCHEMIC NONTRAUMATIC MYOCARDIAL INJURY: ICD-10-CM

## 2025-03-12 DIAGNOSIS — I21.4 NSTEMI (NON-ST ELEVATED MYOCARDIAL INFARCTION): ICD-10-CM

## 2025-03-12 DIAGNOSIS — I50.23 ACUTE ON CHRONIC SYSTOLIC CHF (CONGESTIVE HEART FAILURE): ICD-10-CM

## 2025-03-12 DIAGNOSIS — I25.10 CORONARY ARTERY DISEASE INVOLVING NATIVE CORONARY ARTERY OF NATIVE HEART, UNSPECIFIED WHETHER ANGINA PRESENT: ICD-10-CM

## 2025-03-12 DIAGNOSIS — I10 HYPERTENSION, UNSPECIFIED TYPE: Primary | ICD-10-CM

## 2025-03-12 LAB
ANION GAP SERPL CALCULATED.3IONS-SCNC: 15.1 MMOL/L (ref 5–15)
ANION GAP SERPL CALCULATED.3IONS-SCNC: 17 MMOL/L (ref 5–15)
BUN BLDA-MCNC: 56 MG/DL (ref 8–26)
BUN SERPL-MCNC: 58 MG/DL (ref 8–23)
BUN SERPL-MCNC: 62 MG/DL (ref 8–23)
BUN/CREAT SERPL: 49.6 (ref 7–25)
BUN/CREAT SERPL: 53 (ref 7–25)
CA-I BLDA-SCNC: 1.09 MMOL/L (ref 1.2–1.32)
CALCIUM SPEC-SCNC: 8.7 MG/DL (ref 8.6–10.5)
CALCIUM SPEC-SCNC: 8.8 MG/DL (ref 8.6–10.5)
CHLORIDE BLDA-SCNC: 103 MMOL/L (ref 98–109)
CHLORIDE SERPL-SCNC: 98 MMOL/L (ref 98–107)
CHLORIDE SERPL-SCNC: 99 MMOL/L (ref 98–107)
CO2 BLDA-SCNC: 25 MMOL/L (ref 24–29)
CO2 SERPL-SCNC: 23 MMOL/L (ref 22–29)
CO2 SERPL-SCNC: 23.9 MMOL/L (ref 22–29)
CREAT BLDA-MCNC: 1.3 MG/DL (ref 0.6–1.3)
CREAT SERPL-MCNC: 1.17 MG/DL (ref 0.57–1)
CREAT SERPL-MCNC: 1.17 MG/DL (ref 0.57–1)
DEPRECATED RDW RBC AUTO: 62.2 FL (ref 37–54)
EGFRCR SERPLBLD CKD-EPI 2021: 42.4 ML/MIN/1.73
EGFRCR SERPLBLD CKD-EPI 2021: 48.2 ML/MIN/1.73
EGFRCR SERPLBLD CKD-EPI 2021: 48.2 ML/MIN/1.73
ERYTHROCYTE [DISTWIDTH] IN BLOOD BY AUTOMATED COUNT: 20.6 % (ref 12.3–15.4)
GLUCOSE BLDC GLUCOMTR-MCNC: 243 MG/DL (ref 70–130)
GLUCOSE BLDC GLUCOMTR-MCNC: 282 MG/DL (ref 70–130)
GLUCOSE BLDC GLUCOMTR-MCNC: 354 MG/DL (ref 70–130)
GLUCOSE SERPL-MCNC: 280 MG/DL (ref 65–99)
GLUCOSE SERPL-MCNC: 400 MG/DL (ref 65–99)
HCT VFR BLD AUTO: 39.6 % (ref 34–46.6)
HCT VFR BLDA CALC: 39 % (ref 38–51)
HGB BLD-MCNC: 12.6 G/DL (ref 12–15.9)
HGB BLDA-MCNC: 13.3 G/DL (ref 12–17)
MAGNESIUM SERPL-MCNC: 2.4 MG/DL (ref 1.6–2.4)
MCH RBC QN AUTO: 26.7 PG (ref 26.6–33)
MCHC RBC AUTO-ENTMCNC: 31.8 G/DL (ref 31.5–35.7)
MCV RBC AUTO: 83.9 FL (ref 79–97)
PLATELET # BLD AUTO: 351 10*3/MM3 (ref 140–450)
PMV BLD AUTO: 10.8 FL (ref 6–12)
POTASSIUM BLDA-SCNC: 4.3 MMOL/L (ref 3.5–4.9)
POTASSIUM SERPL-SCNC: 2.7 MMOL/L (ref 3.5–5.2)
POTASSIUM SERPL-SCNC: 4 MMOL/L (ref 3.5–5.2)
RBC # BLD AUTO: 4.72 10*6/MM3 (ref 3.77–5.28)
SODIUM BLD-SCNC: 140 MMOL/L (ref 138–146)
SODIUM SERPL-SCNC: 138 MMOL/L (ref 136–145)
SODIUM SERPL-SCNC: 138 MMOL/L (ref 136–145)
UFH PPP CHRO-ACNC: 0.38 IU/ML (ref 0.3–0.7)
UFH PPP CHRO-ACNC: 0.39 IU/ML (ref 0.3–0.7)
WBC NRBC COR # BLD AUTO: 9.24 10*3/MM3 (ref 3.4–10.8)

## 2025-03-12 PROCEDURE — 25010000002 LIDOCAINE PF 1% 1 % SOLUTION: Performed by: INTERNAL MEDICINE

## 2025-03-12 PROCEDURE — 99239 HOSP IP/OBS DSCHRG MGMT >30: CPT | Performed by: INTERNAL MEDICINE

## 2025-03-12 PROCEDURE — C1894 INTRO/SHEATH, NON-LASER: HCPCS | Performed by: INTERNAL MEDICINE

## 2025-03-12 PROCEDURE — 25010000002 FUROSEMIDE PER 20 MG: Performed by: INTERNAL MEDICINE

## 2025-03-12 PROCEDURE — 25010000002 PHENYLEPHRINE 10 MG/ML SOLUTION: Performed by: INTERNAL MEDICINE

## 2025-03-12 PROCEDURE — 25010000002 NICARDIPINE 2.5 MG/ML SOLUTION: Performed by: INTERNAL MEDICINE

## 2025-03-12 PROCEDURE — 93459 L HRT ART/GRFT ANGIO: CPT | Performed by: INTERNAL MEDICINE

## 2025-03-12 PROCEDURE — 85027 COMPLETE CBC AUTOMATED: CPT | Performed by: INTERNAL MEDICINE

## 2025-03-12 PROCEDURE — 82948 REAGENT STRIP/BLOOD GLUCOSE: CPT | Performed by: STUDENT IN AN ORGANIZED HEALTH CARE EDUCATION/TRAINING PROGRAM

## 2025-03-12 PROCEDURE — 85014 HEMATOCRIT: CPT

## 2025-03-12 PROCEDURE — G0378 HOSPITAL OBSERVATION PER HR: HCPCS

## 2025-03-12 PROCEDURE — 25010000002 FENTANYL CITRATE (PF) 50 MCG/ML SOLUTION: Performed by: INTERNAL MEDICINE

## 2025-03-12 PROCEDURE — C1887 CATHETER, GUIDING: HCPCS | Performed by: INTERNAL MEDICINE

## 2025-03-12 PROCEDURE — 83735 ASSAY OF MAGNESIUM: CPT | Performed by: INTERNAL MEDICINE

## 2025-03-12 PROCEDURE — 25010000002 HEPARIN (PORCINE) PER 1000 UNITS: Performed by: INTERNAL MEDICINE

## 2025-03-12 PROCEDURE — B2111ZZ FLUOROSCOPY OF MULTIPLE CORONARY ARTERIES USING LOW OSMOLAR CONTRAST: ICD-10-PCS | Performed by: INTERNAL MEDICINE

## 2025-03-12 PROCEDURE — 63710000001 INSULIN LISPRO (HUMAN) PER 5 UNITS: Performed by: INTERNAL MEDICINE

## 2025-03-12 PROCEDURE — 25010000002 MIDAZOLAM PER 1 MG: Performed by: INTERNAL MEDICINE

## 2025-03-12 PROCEDURE — 25510000001 IOPAMIDOL PER 1 ML: Performed by: INTERNAL MEDICINE

## 2025-03-12 PROCEDURE — 80048 BASIC METABOLIC PNL TOTAL CA: CPT | Performed by: HOSPITALIST

## 2025-03-12 PROCEDURE — 4A023N7 MEASUREMENT OF CARDIAC SAMPLING AND PRESSURE, LEFT HEART, PERCUTANEOUS APPROACH: ICD-10-PCS | Performed by: INTERNAL MEDICINE

## 2025-03-12 PROCEDURE — 80048 BASIC METABOLIC PNL TOTAL CA: CPT | Performed by: INTERNAL MEDICINE

## 2025-03-12 PROCEDURE — 80047 BASIC METABLC PNL IONIZED CA: CPT

## 2025-03-12 PROCEDURE — 63710000001 INSULIN LISPRO (HUMAN) PER 5 UNITS: Performed by: STUDENT IN AN ORGANIZED HEALTH CARE EDUCATION/TRAINING PROGRAM

## 2025-03-12 PROCEDURE — B2181ZZ FLUOROSCOPY OF LEFT INTERNAL MAMMARY BYPASS GRAFT USING LOW OSMOLAR CONTRAST: ICD-10-PCS | Performed by: INTERNAL MEDICINE

## 2025-03-12 PROCEDURE — C1769 GUIDE WIRE: HCPCS | Performed by: INTERNAL MEDICINE

## 2025-03-12 PROCEDURE — 99214 OFFICE O/P EST MOD 30 MIN: CPT | Performed by: INTERNAL MEDICINE

## 2025-03-12 PROCEDURE — 25810000003 SODIUM CHLORIDE 0.9 % SOLUTION: Performed by: INTERNAL MEDICINE

## 2025-03-12 PROCEDURE — 85520 HEPARIN ASSAY: CPT | Performed by: INTERNAL MEDICINE

## 2025-03-12 PROCEDURE — 82948 REAGENT STRIP/BLOOD GLUCOSE: CPT

## 2025-03-12 RX ORDER — ATORVASTATIN CALCIUM 40 MG/1
40 TABLET, FILM COATED ORAL NIGHTLY
Status: DISCONTINUED | OUTPATIENT
Start: 2025-03-12 | End: 2025-03-14 | Stop reason: HOSPADM

## 2025-03-12 RX ORDER — FENTANYL CITRATE 50 UG/ML
INJECTION, SOLUTION INTRAMUSCULAR; INTRAVENOUS
Status: DISCONTINUED | OUTPATIENT
Start: 2025-03-12 | End: 2025-03-12 | Stop reason: HOSPADM

## 2025-03-12 RX ORDER — ONDANSETRON 2 MG/ML
4 INJECTION INTRAMUSCULAR; INTRAVENOUS EVERY 6 HOURS PRN
Status: DISCONTINUED | OUTPATIENT
Start: 2025-03-12 | End: 2025-03-14 | Stop reason: HOSPADM

## 2025-03-12 RX ORDER — SACUBITRIL AND VALSARTAN 24; 26 MG/1; MG/1
1 TABLET, FILM COATED ORAL EVERY 12 HOURS SCHEDULED
Status: DISCONTINUED | OUTPATIENT
Start: 2025-03-12 | End: 2025-03-14 | Stop reason: HOSPADM

## 2025-03-12 RX ORDER — SACUBITRIL AND VALSARTAN 24; 26 MG/1; MG/1
1 TABLET, FILM COATED ORAL EVERY 12 HOURS SCHEDULED
Qty: 60 TABLET | Refills: 0 | Status: ON HOLD | OUTPATIENT
Start: 2025-03-12 | End: 2025-03-14

## 2025-03-12 RX ORDER — CARVEDILOL 3.12 MG/1
3.12 TABLET ORAL 2 TIMES DAILY WITH MEALS
Status: DISCONTINUED | OUTPATIENT
Start: 2025-03-12 | End: 2025-03-14 | Stop reason: HOSPADM

## 2025-03-12 RX ORDER — METOPROLOL TARTRATE 25 MG/1
12.5 TABLET, FILM COATED ORAL 2 TIMES DAILY
COMMUNITY
End: 2025-03-14 | Stop reason: HOSPADM

## 2025-03-12 RX ORDER — NICOTINE POLACRILEX 4 MG
15 LOZENGE BUCCAL
Status: DISCONTINUED | OUTPATIENT
Start: 2025-03-12 | End: 2025-03-14 | Stop reason: HOSPADM

## 2025-03-12 RX ORDER — SODIUM CHLORIDE 9 MG/ML
INJECTION, SOLUTION INTRAVENOUS
Status: COMPLETED | OUTPATIENT
Start: 2025-03-12 | End: 2025-03-12

## 2025-03-12 RX ORDER — SODIUM CHLORIDE 9 MG/ML
40 INJECTION, SOLUTION INTRAVENOUS AS NEEDED
Status: DISCONTINUED | OUTPATIENT
Start: 2025-03-12 | End: 2025-03-14 | Stop reason: HOSPADM

## 2025-03-12 RX ORDER — HEPARIN SODIUM 1000 [USP'U]/ML
50 INJECTION, SOLUTION INTRAVENOUS; SUBCUTANEOUS AS NEEDED
Status: DISCONTINUED | OUTPATIENT
Start: 2025-03-12 | End: 2025-03-12

## 2025-03-12 RX ORDER — CLOPIDOGREL BISULFATE 75 MG/1
75 TABLET ORAL DAILY
Qty: 30 TABLET | Refills: 0 | Status: ON HOLD | OUTPATIENT
Start: 2025-03-13 | End: 2025-03-14

## 2025-03-12 RX ORDER — GLIPIZIDE 10 MG/1
20 TABLET, FILM COATED, EXTENDED RELEASE ORAL 2 TIMES DAILY
COMMUNITY

## 2025-03-12 RX ORDER — HEPARIN SODIUM 1000 [USP'U]/ML
INJECTION, SOLUTION INTRAVENOUS; SUBCUTANEOUS
Status: DISCONTINUED | OUTPATIENT
Start: 2025-03-12 | End: 2025-03-12 | Stop reason: HOSPADM

## 2025-03-12 RX ORDER — BISACODYL 5 MG/1
5 TABLET, DELAYED RELEASE ORAL DAILY PRN
Status: DISCONTINUED | OUTPATIENT
Start: 2025-03-12 | End: 2025-03-14 | Stop reason: HOSPADM

## 2025-03-12 RX ORDER — DEXTROSE MONOHYDRATE 25 G/50ML
25 INJECTION, SOLUTION INTRAVENOUS
Status: DISCONTINUED | OUTPATIENT
Start: 2025-03-12 | End: 2025-03-14 | Stop reason: HOSPADM

## 2025-03-12 RX ORDER — ACETAMINOPHEN 650 MG/1
650 SUPPOSITORY RECTAL EVERY 4 HOURS PRN
Status: DISCONTINUED | OUTPATIENT
Start: 2025-03-12 | End: 2025-03-14 | Stop reason: HOSPADM

## 2025-03-12 RX ORDER — ACETAMINOPHEN 325 MG/1
650 TABLET ORAL EVERY 4 HOURS PRN
Status: DISCONTINUED | OUTPATIENT
Start: 2025-03-12 | End: 2025-03-14 | Stop reason: HOSPADM

## 2025-03-12 RX ORDER — INSULIN LISPRO 100 [IU]/ML
2-9 INJECTION, SOLUTION INTRAVENOUS; SUBCUTANEOUS
Status: DISCONTINUED | OUTPATIENT
Start: 2025-03-12 | End: 2025-03-14 | Stop reason: HOSPADM

## 2025-03-12 RX ORDER — POLYETHYLENE GLYCOL 3350 17 G/17G
17 POWDER, FOR SOLUTION ORAL DAILY PRN
Status: DISCONTINUED | OUTPATIENT
Start: 2025-03-12 | End: 2025-03-14 | Stop reason: HOSPADM

## 2025-03-12 RX ORDER — POTASSIUM CHLORIDE 1500 MG/1
40 TABLET, EXTENDED RELEASE ORAL EVERY 4 HOURS
Status: DISCONTINUED | OUTPATIENT
Start: 2025-03-12 | End: 2025-03-12 | Stop reason: HOSPADM

## 2025-03-12 RX ORDER — ASPIRIN 81 MG/1
81 TABLET ORAL DAILY
Status: DISCONTINUED | OUTPATIENT
Start: 2025-03-13 | End: 2025-03-13 | Stop reason: SDUPTHER

## 2025-03-12 RX ORDER — ACETAMINOPHEN 160 MG/5ML
650 SOLUTION ORAL EVERY 4 HOURS PRN
Status: DISCONTINUED | OUTPATIENT
Start: 2025-03-12 | End: 2025-03-14 | Stop reason: HOSPADM

## 2025-03-12 RX ORDER — LIDOCAINE HYDROCHLORIDE 10 MG/ML
INJECTION, SOLUTION EPIDURAL; INFILTRATION; INTRACAUDAL; PERINEURAL
Status: DISCONTINUED | OUTPATIENT
Start: 2025-03-12 | End: 2025-03-12 | Stop reason: HOSPADM

## 2025-03-12 RX ORDER — PIOGLITAZONE 45 MG/1
45 TABLET ORAL DAILY
COMMUNITY
End: 2025-03-14 | Stop reason: HOSPADM

## 2025-03-12 RX ORDER — NITROGLYCERIN 0.4 MG/1
0.4 TABLET SUBLINGUAL
Status: DISCONTINUED | OUTPATIENT
Start: 2025-03-12 | End: 2025-03-14 | Stop reason: HOSPADM

## 2025-03-12 RX ORDER — AMOXICILLIN 250 MG
2 CAPSULE ORAL 2 TIMES DAILY PRN
Status: DISCONTINUED | OUTPATIENT
Start: 2025-03-12 | End: 2025-03-14 | Stop reason: HOSPADM

## 2025-03-12 RX ORDER — HEPARIN SODIUM 1000 [USP'U]/ML
25 INJECTION, SOLUTION INTRAVENOUS; SUBCUTANEOUS AS NEEDED
Status: DISCONTINUED | OUTPATIENT
Start: 2025-03-12 | End: 2025-03-12

## 2025-03-12 RX ORDER — HEPARIN SODIUM 10000 [USP'U]/100ML
18 INJECTION, SOLUTION INTRAVENOUS
Status: DISCONTINUED | OUTPATIENT
Start: 2025-03-12 | End: 2025-03-12

## 2025-03-12 RX ORDER — IOPAMIDOL 755 MG/ML
INJECTION, SOLUTION INTRAVASCULAR
Status: DISCONTINUED | OUTPATIENT
Start: 2025-03-12 | End: 2025-03-12 | Stop reason: HOSPADM

## 2025-03-12 RX ORDER — BISACODYL 10 MG
10 SUPPOSITORY, RECTAL RECTAL DAILY PRN
Status: DISCONTINUED | OUTPATIENT
Start: 2025-03-12 | End: 2025-03-14 | Stop reason: HOSPADM

## 2025-03-12 RX ORDER — MIDAZOLAM HYDROCHLORIDE 1 MG/ML
INJECTION, SOLUTION INTRAMUSCULAR; INTRAVENOUS
Status: DISCONTINUED | OUTPATIENT
Start: 2025-03-12 | End: 2025-03-12 | Stop reason: HOSPADM

## 2025-03-12 RX ORDER — PHENYLEPHRINE HYDROCHLORIDE 10 MG/ML
INJECTION INTRAVENOUS
Status: DISCONTINUED | OUTPATIENT
Start: 2025-03-12 | End: 2025-03-12 | Stop reason: HOSPADM

## 2025-03-12 RX ORDER — SODIUM CHLORIDE 0.9 % (FLUSH) 0.9 %
10 SYRINGE (ML) INJECTION EVERY 12 HOURS SCHEDULED
Status: DISCONTINUED | OUTPATIENT
Start: 2025-03-12 | End: 2025-03-14 | Stop reason: HOSPADM

## 2025-03-12 RX ORDER — HEPARIN SODIUM 1000 [USP'U]/ML
60 INJECTION, SOLUTION INTRAVENOUS; SUBCUTANEOUS ONCE
Status: DISCONTINUED | OUTPATIENT
Start: 2025-03-12 | End: 2025-03-12

## 2025-03-12 RX ORDER — CARVEDILOL 3.12 MG/1
3.12 TABLET ORAL 2 TIMES DAILY WITH MEALS
Qty: 60 TABLET | Refills: 0 | Status: ON HOLD | OUTPATIENT
Start: 2025-03-12 | End: 2025-03-14

## 2025-03-12 RX ORDER — SODIUM CHLORIDE 0.9 % (FLUSH) 0.9 %
10 SYRINGE (ML) INJECTION AS NEEDED
Status: DISCONTINUED | OUTPATIENT
Start: 2025-03-12 | End: 2025-03-14 | Stop reason: HOSPADM

## 2025-03-12 RX ORDER — HEPARIN SODIUM 10000 [USP'U]/100ML
20 INJECTION, SOLUTION INTRAVENOUS
Status: ON HOLD
Start: 2025-03-12 | End: 2025-03-13

## 2025-03-12 RX ORDER — CLOPIDOGREL BISULFATE 75 MG/1
75 TABLET ORAL DAILY
Status: DISCONTINUED | OUTPATIENT
Start: 2025-03-13 | End: 2025-03-14 | Stop reason: HOSPADM

## 2025-03-12 RX ORDER — ASPIRIN 81 MG/1
324 TABLET, CHEWABLE ORAL ONCE
Status: COMPLETED | OUTPATIENT
Start: 2025-03-12 | End: 2025-03-12

## 2025-03-12 RX ORDER — HEPARIN SODIUM 10000 [USP'U]/100ML
20 INJECTION, SOLUTION INTRAVENOUS
Status: DISCONTINUED | OUTPATIENT
Start: 2025-03-12 | End: 2025-03-12

## 2025-03-12 RX ORDER — FUROSEMIDE 20 MG/1
20 TABLET ORAL DAILY
Qty: 30 TABLET | Refills: 0 | Status: ON HOLD | OUTPATIENT
Start: 2025-03-12 | End: 2025-03-14

## 2025-03-12 RX ORDER — ASPIRIN 81 MG/1
81 TABLET ORAL DAILY
Status: DISCONTINUED | OUTPATIENT
Start: 2025-03-13 | End: 2025-03-14 | Stop reason: HOSPADM

## 2025-03-12 RX ADMIN — CARVEDILOL 3.12 MG: 6.25 TABLET, FILM COATED ORAL at 08:38

## 2025-03-12 RX ADMIN — ASPIRIN 324 MG: 81 TABLET, CHEWABLE ORAL at 20:51

## 2025-03-12 RX ADMIN — CLOPIDOGREL BISULFATE 75 MG: 75 TABLET ORAL at 08:38

## 2025-03-12 RX ADMIN — INSULIN LISPRO 4 UNITS: 100 INJECTION, SOLUTION INTRAVENOUS; SUBCUTANEOUS at 08:37

## 2025-03-12 RX ADMIN — SACUBITRIL AND VALSARTAN 1 TABLET: 24; 26 TABLET, FILM COATED ORAL at 20:51

## 2025-03-12 RX ADMIN — SACUBITRIL AND VALSARTAN 1 TABLET: 24; 26 TABLET, FILM COATED ORAL at 08:38

## 2025-03-12 RX ADMIN — Medication 10 ML: at 08:39

## 2025-03-12 RX ADMIN — ATORVASTATIN CALCIUM 40 MG: 40 TABLET, FILM COATED ORAL at 20:51

## 2025-03-12 RX ADMIN — Medication 10 ML: at 20:52

## 2025-03-12 RX ADMIN — INSULIN LISPRO 6 UNITS: 100 INJECTION, SOLUTION INTRAVENOUS; SUBCUTANEOUS at 20:52

## 2025-03-12 RX ADMIN — FUROSEMIDE 40 MG: 10 INJECTION, SOLUTION INTRAMUSCULAR; INTRAVENOUS at 08:38

## 2025-03-12 RX ADMIN — POTASSIUM CHLORIDE 40 MEQ: 20 TABLET, EXTENDED RELEASE ORAL at 10:02

## 2025-03-12 RX ADMIN — EMPAGLIFLOZIN 10 MG: 10 TABLET, FILM COATED ORAL at 08:38

## 2025-03-12 RX ADMIN — ASPIRIN 81 MG: 81 TABLET, COATED ORAL at 08:38

## 2025-03-12 NOTE — DISCHARGE SUMMARY
[facility]      Name:  Abena Washington   Age:  79 y.o.  Sex:  female  :  1945  MRN:  [id number]   Visit Number:  [id number]    Admission Date:  3/9/2025  Date of Discharge:  3/12/2025  Primary Care Physician:  Eddi Cheatham MD    Important issues to note:    Start: entresto, coreg, lasix, plavix  Stop: metoprolol  Follow up: PCP and Cardiology  Brief Summary: Presented with CP due to CAD. Initially had recommended transfer to [facility] per cardiology. Was approved for transfer and bed obtained.    Discharge Diagnoses:       NSTEMI (non-ST elevated myocardial infarction)    CAD s/p CABG x 5 2025    Hypertension    Hyperlipidemia    Type 2 diabetes mellitus    Nonischemic nontraumatic myocardial injury        Problem List:     Active Hospital Problems    Diagnosis  POA    **NSTEMI (non-ST elevated myocardial infarction) [I21.4]  Yes    Nonischemic nontraumatic myocardial injury [I5A]  Yes    CAD s/p CABG x 5 2025 [I25.10]  Yes    Hypertension [I10]  Yes    Hyperlipidemia [E78.5]  Yes    Type 2 diabetes mellitus [E11.9]  Yes      Resolved Hospital Problems   No resolved problems to display.     Presenting Problem:    Chief Complaint   Patient presents with    Chest Pain      Consults:     Consulting Physician(s)         Provider   Role Specialty     Jose Alfredo Marx MD      Consulting Physician Cardiology            3/12: accepted to [facility]    History of presenting illness     Abena Washington is a 79-year-old female history significant for hypertension, hyperlipidemia and type 2 diabetes.  Of note patient also had CABG x 6 on 2025.  She presented to [facility] ED with concern of ongoing chest pain for the past 3 days.  Patient reports a midsternal chest pressure with associated belching.  Patient reports that pain has been intermittent with no alleviating factors.  Her children begged her to finally come to the emergency room on 3/2025.  Patient  denies nausea/vomiting and diaphoresis.  She received nitroglycerin and at the time of my exam is chest pain-free. Of note, patient reports that her Plavix was sent to her mail order pharmacy and never delivered.  So patient has only been taking aspirin monotherapy and has not been taking Plavix since her CABG. CT of the chest showed no PE.  Does show findings consistent with cardiomegaly, pleural effusions, and interlobular septal fluid consistent with volume overload. Troponins down trended to 213-199. Cardiologist on-call, Dr. Arshad contacted.  Recommended initiation of heparin drip and hospitalist contacted to admit.     Inpatient general floor admission 3/9/2025 with suspected NSTEMI, acute exacerbation of heart failure reduced ejection fraction, chest pain with elevated troponins, history recent CABG.     Transfer to [facility] for cardiac catheterization with CTS standby.        Chronic: History of CABG, heart failure reduced ejection fraction, type 2 diabetes, hypertension, hyperlipidemia, anxiety and depression  Subcutaneous insulin protocol.  Hyperglycemia noted with A1c 5.6%.  Continue other home medications.      Edited by: Kermit Rojas DO at 3/12/2025 0844    Chest pain, resolved  Elevated Troponin  CAD s/p CABG, 01/28/2025  Transfer to [facility] for cardiac catheterization with interventional standby.  Troponins down trended; EKG notable for lateral ST depression  Cardiology consulted and appreciate recommendation.  Dr. Marx recommended transfer to [facility].  Entresto started.  Lopressor changed to Coreg.  Heparin drip per cardiology.  Echocardiogram EF 34.7%, grade 3 diastolic dysfunction.  Currently chest pain-free  Continue aspirin and Plavix *patient never received her Plavix that was prescribed to mail order pharmacy post CABG  Continue Lipitor 40 mg  HFrEF with acute exacerbation  Received IV Lasix transition to oral.  Started GDMT,  coreg, entresto, jardiance  Currently stable on room air  Strict I's and O's, daily weight  Hypokalemia  Will need continued replacement     Chronic: History of CABG, heart failure reduced ejection fraction, type 2 diabetes, hypertension, hyperlipidemia, anxiety and depression  Subcutaneous insulin protocol.  Hyperglycemia noted with A1c 5.6%.  Continue other home medications.    DVT Prophylaxis: heparin gtt  Code Status:  full  Diet:  cardiac  Disposition: [facility] transfer approved      Edited by: Kermit Rojas DO at 3/12/2025 0844      Vital Signs:    Temp:  [96.8 °F (36 °C)-98.5 °F (36.9 °C)] 96.8 °F (36 °C)  Heart Rate:  [73-90] 77  Resp:  [12-21] 20  BP: ()/(48-68) 110/63    Physical Exam:    Constitutional: No acute distress, awake, alert  HENT: NCAT, mucous membranes moist  Respiratory: Clear to auscultation bilaterally, respiratory effort normal   Cardiovascular: RRR, no murmurs, rubs, or gallops  Gastrointestinal: Positive bowel sounds, soft, nontender, nondistended  Musculoskeletal: No bilateral ankle edema  Psychiatric: Appropriate affect, cooperative  Neurologic: Oriented x 3, speech clear  Skin: No rashes  Exam Stable 3/12/25  Edited by: Kermit Rojas DO at 3/12/2025 0844    Pertinent Lab Results:     Results from last 7 days   Lab Units 03/12/25  0613 03/10/25  0528 03/09/25  1844   SODIUM mmol/L 138 140 137   POTASSIUM mmol/L 2.7* 3.4* 4.2   CHLORIDE mmol/L 99 100 97*   CO2 mmol/L 23.9 19.3* 19.1*   BUN mg/dL 58* 22 25*   CREATININE mg/dL 1.17* 0.86 1.05*   CALCIUM mg/dL 8.7 9.1 9.4   BILIRUBIN mg/dL  --   --  0.3   ALK PHOS U/L  --   --  97   ALT (SGPT) U/L  --   --  25   AST (SGOT) U/L  --   --  34*   GLUCOSE mg/dL 280* 197* 303*     Results from last 7 days   Lab Units 03/12/25  0613 03/10/25  0528 03/09/25  1844   WBC 10*3/mm3 9.24 9.91 10.36   HEMOGLOBIN g/dL 12.6 11.4* 12.7   HEMATOCRIT % 39.6 36.8 42.0   PLATELETS 10*3/mm3 351 382 433     Results from last 7 days   Lab Units  03/09/25  2251   INR  1.10     Results from last 7 days   Lab Units 03/09/25 2036 03/09/25  1844   HSTROP T ng/L 199* 213*     Results from last 7 days   Lab Units 03/09/25  1844   PROBNP pg/mL 20,136.0*                       Pertinent Radiology Results:    Imaging Results (All)       Procedure Component Value Units Date/Time    XR Chest 1 View [[id number]] Collected: 03/10/25 0907     Updated: 03/10/25 0915    Narrative:      PROCEDURE: XR CHEST 1 VW-        HISTORY: Chest pressure, recent CABG, chest pain     COMPARISON: February 5, 2025.     FINDINGS: The patient is rotated to the left. The heart is mildly  enlarged, but stable. The patient is status post median sternotomy for  CABG. Small bilateral pleural effusions are seen, worsened on the right.  There is worsening left basilar atelectasis or infiltrate. There is  worsening interstitial disease. There is no pneumothorax. There are no  acute osseous abnormalities.       Impression:      Findings compatible with fluid overload/worsening CHF.  Continued follow-up is recommended.        Images were reviewed, interpreted, and dictated by Dr. Karina Phelps MD  Transcribed by Christina Tavarez PA-C.     This report was signed and finalized on 3/10/2025 9:13 AM by Karina Phelps MD.       CT Angiogram Chest Pulmonary Embolism [[id number]] Collected: 03/09/25 2136     Updated: 03/09/25 2138    Narrative:      FINAL REPORT    TECHNIQUE:  null    CLINICAL HISTORY:  mid sternal Chest pain, recent CABG    COMPARISON:  null    FINDINGS:  CT angiography chest with contrast. 3D Postprocessing.    Comparison: None    Findings:    No pulmonary embolism.    No thoracic aortic aneurysm.    Atheromatous aortic and coronary arterial calcifications.    Mild-to-moderate cardiomegaly. CABG changes.    Pulmonary vascular prominence with interlobular septal fluid.    Mild bibasilar atelectasis.    No focal infiltrates or masses.    No pneumothorax    Moderate right and small left  effusions.    No adenopathy.    No acute upper abdominal pathology.    Bilateral low-density adrenal nodules consistent with adenomas, 2.8 cm on the right and 1.8 cm on the left.    No acute chest wall pathology.    No acute fracture or dislocation. Several remote left rib fractures.    Sternotomy.    Degenerative changes.      Impression:      IMPRESSION:    No pulmonary emboli.    Combination of findings including cardiomegaly, pleural effusions, and interlobular septal fluid consistent with volume overload without william alveolar edema.    Bilateral low-density adrenal nodules consistent with adenomas    Authenticated and Electronically Signed by Alycia Perry MD  on 03/09/2025 09:36:16 PM            Echo:    Results for orders placed during the hospital encounter of 03/09/25    Adult Transthoracic Echo Complete w/ Color, Spectral and Contrast if necessary per protocol    Interpretation Summary    Left ventricular systolic function is moderately decreased. Calculated left ventricular EF = 34.7% Left ventricular ejection fraction appears to be 31 - 35%.    The left ventricular cavity is mild to moderately dilated.    Left ventricular diastolic function is consistent with (grade III w/high LAP) reversible restrictive pattern.    Condition on Discharge:      Stable.    Code status during the hospital stay:    Code Status and Medical Interventions: CPR (Attempt to Resuscitate); Full Support   Ordered at: 03/09/25 2221     Code Status (Patient has no pulse and is not breathing):    CPR (Attempt to Resuscitate)     Medical Interventions (Patient has pulse or is breathing):    Full Support     Discharge Disposition:    Short Term Hospital (DC)    Discharge Medications:       Discharge Medications        New Medications        Instructions Start Date   carvedilol 3.125 MG tablet  Commonly known as: COREG   3.125 mg, Oral, 2 Times Daily With Meals      clopidogrel 75 MG tablet  Commonly known as: PLAVIX   75 mg, Oral,  Daily   Start Date: March 13, 2025     furosemide 20 MG tablet  Commonly known as: Lasix   20 mg, Oral, Daily      heparin (porcine) 100 UNIT/ML solution in D5W infusion   20 Units/kg/hr (998 Units/hr), Intravenous, Titrated      sacubitril-valsartan 24-26 MG tablet  Commonly known as: ENTRESTO   1 tablet, Oral, Every 12 Hours Scheduled             Continue These Medications        Instructions Start Date   alendronate 70 MG tablet  Commonly known as: FOSAMAX   70 mg, Every 7 Days      aspirin 325 MG tablet   325 mg, Oral, Daily      atorvastatin 40 MG tablet  Commonly known as: LIPITOR   40 mg, Oral, Nightly      empagliflozin 10 MG tablet tablet  Commonly known as: JARDIANCE   10 mg, Oral, Daily      metFORMIN 1000 MG tablet  Commonly known as: GLUCOPHAGE   1,000 mg, 2 Times Daily With Meals      Oncovite tablet tablet  Generic drug: multivitamin with minerals   0.5 tablets, Daily             Stop These Medications      glipizide 10 MG tablet  Commonly known as: GLUCOTROL     metoprolol tartrate 25 MG tablet  Commonly known as: LOPRESSOR     pioglitazone 45 MG tablet  Commonly known as: ACTOS            Discharge Diet:     Diet Instructions       Advance Diet As Tolerated -Target Diet: NPO      Target Diet: NPO          Activity at Discharge:       Follow-up Appointments:    Additional Instructions for the Follow-ups that You Need to Schedule       Ambulatory Referral to Cardiac Rehab   As directed      Discharge Follow-up with PCP   As directed       Currently Documented PCP:    Eddi Cheatham MD    PCP Phone Number:    [phone]     Follow Up Details: 1 week        Discharge Follow-up with Specified Provider: Cardiology; 2 Weeks   As directed      To: Cardiology   Follow Up: 2 Weeks               Follow-up Information       Eddi Cheatham MD .    Specialty: Internal Medicine  Why: 1 week  Contact information:  [address] [place] [address] [id number] [phone]                           Future Appointments    Date Time Provider Department Center   3/18/2025 10:30 AM Ana Maria Zarco, [facility]     Test Results Pending at Discharge:           Kermit Rojas DO  03/12/25  08:50 EDT    Time: I spent 45 minutes on this discharge activity which included: face-to-face encounter with the patient, reviewing the data in the system, coordination of the care with the nursing staff as well as consultants, documentation, and entering orders.     Dictated utilizing Dragon dictation.

## 2025-03-12 NOTE — Clinical Note
A 6 fr sheath was successfully inserted using micropuncture technique with ultrasound guidance into the left radial artery.

## 2025-03-12 NOTE — PROGRESS NOTES
Pharmacy to Dose Heparin Infusion Note    Abena Washington is a 79 y.o. female receiving heparin infusion.     Therapy for (VTE/Cardiac): Cardiac  Patient Weight: 49.9 kg  Initial Bolus (Y/N): No  Any Bolus (Y/N): Yes     Signs or Symptoms of Bleeding: No    Cardiac or Other (Not VTE)   Initial Bolus: 60 units/kg (Max 4,000 units)  Initial rate: 12 units/kg/hr (Max 1,000 units/hr)   Anti-Xa Bolus   Dose Infusion Hold   Time Infusion Rate Change (units/kg/hr) Repeat  Anti-Xa    < 0.11 50 units/kg  (4000 units Max) None Increase by  3 units/kg/hr 6 hours   0.11- 0.19 25 units/kg  (2000 units Max) None Increase by  2 units/kg/hr 6 hours   0.2 - 0.29 0 None Increase by  1 units/kg/hr 6 hours   0.3 - 0.5 0 None No Change 6 hours (after 2 consecutive levels in range check qAM)   0.51 - 0.6 0 None Decrease by  1 units/kg/hr 6 hours   0.61 - 0.8 0 30 minutes Decrease by  2 units/kg/hr 6 hours   0.81 - 1 0 60 minutes Decrease by  3 units/kg/hr 6 hours   >1 0 Hold  After Anti-Xa less than 0.5 decrease previous rate by  4 units/kg/hr  Every 2 hours until Anti-Xa  less than 0.5 then when infusion restarts in 6 hours       Results from last 7 days   Lab Units 03/12/25  0613 03/10/25  0528 03/09/25  2251 03/09/25  1844   INR   --   --  1.10  --    HEMOGLOBIN g/dL 12.6 11.4*  --  12.7   HEMATOCRIT % 39.6 36.8  --  42.0   PLATELETS 10*3/mm3 351 382  --  433       Date   Time   Anti-Xa Current Rate (units/kg/hr) Bolus   (units) Rate Change   (units/kg/hr) New Rate (units/kg/hr) Repeat  Anti-Xa Comments /  Pump Check    3/12/25 1403 -- -- -- +18 18 2000 Patient arrived from [facility] on heparin infusion.  Per chart review she has been stable on this rate since 3/11/25, will continue.  JESSICA Ford, PharmD,  BCPS   3/12/2025  14:11 EDT

## 2025-03-12 NOTE — PROGRESS NOTES
Pharmacy to Dose Heparin Infusion    Abena Washington is a  79 y.o. female receiving heparin infusion.     Therapy for (VTE/Cardiac):   cardiac  Patient Weight: 49.9 kg  Initial Bolus (Y/N):   Y  Any Bolus (Y/N):   Y        Signs or Symptoms of Bleeding: N    Cardiac or Other (Not VTE)   Initial Bolus: 60 units/kg (Max 4,000 units)  Initial rate: 12 units/kg/hr (Max 1,000 units/hr)   Anti Xa Rebolus Infusion Hold time Change infusion Dose (Units/kg/hr) Next Anti Xa or aPTT Level Due   < 0.1 50 Units/kg  (4000 Units Max) None Increase by  3 Units/kg/hr 6 hours   0.1- 0.19 25 Units/kg  (2000 Units Max) None Increase by  2 Units/kg/hr 6 hours   0.2 - 0.29 0 None Increase by  1 Units/kg/hr 6 hours   0.3 - 0.5 0 None No Change 6 hours (after 2 consecutive levels in range check qAM)   0.51 - 0.6 0 None Decrease by  1 Units/kg/hr 6 hours   0.61 - 0.8 0 30 Minutes Decrease by  2 Units/kg/hr 6 hours   0.81 - 1 0 60 Minutes Decrease by  3 Units/kg/hr 6 hours   >1 0 Hold  After Anti Xa less than 0.5 decrease previous rate by  4 Units/kg/hr  Every 2 hours until Anti Xa  less than 0.5 then when infusion restarts in 6 hours         Recommend anti-Xa every 6 hours.         Lab 03/12/25  0951 03/12/25  0613 03/12/25  0256 03/11/25  2105 03/11/25  1152 03/11/25  0501 03/10/25  1424 03/10/25  0528 03/09/25  2251 03/09/25  2251 03/09/25  1844   HEMOGLOBIN  --  12.6  --   --   --   --   --  11.4*  --   --  12.7   HEMATOCRIT  --  39.6  --   --   --   --   --  36.8  --   --  42.0   PLATELETS  --  351  --   --   --   --   --  382  --   --  433   PROTIME  --   --   --   --   --   --   --   --   --  14.7  --    APTT  --   --   --   --   --   --   --   --   --  30.5*  --    HEPARIN ANTI-XA 0.38  --  0.39 0.33 0.23* 0.26*   < > 0.10*   < > 0.10*  --    CREATININE  --  1.17*  --   --   --   --   --  0.86  --   --  1.05*   EGFR  --  48.2*  --   --   --   --   --  69.7  --   --  54.8*    < > = values in this interval not displayed.          Date    Time   Anti-Xa Current Rate (Unit/kg/hr) Bolus   (Units) Rate Change   (Unit/kg/hr) New Rate (Unit/kg/hr) Next   Anti-Xa Comments  Pump Check Daily   3/9/25 2251 0.10 0 2990 +12 12 0500 D/W  Antoinette JosephRN   3/10/25 0528 0.10 0 1250 +2 14 3/10 1300 d/w June Campbell RN   3/10/25 1424 0.10 14 1250 +2 16 3/10 2130 D/W Kelin   3/10/25 2138 0.23 16 0 +1 17 3/11@0500 D/W MADDI Watkins   3/11/25 0501 0.26 17 0 +1 18 3/11@1145 D/W MADDI Watkins   3/11/25 2105 0.33 18 0 0 18 0245 D/W Adriana   3/12/25 0256 0.39 18 0 0 18 0900 D/W Adriana   3/12/25 0951 0.38 18 0 0 18 0600 Patient being transferred to [facility]                                                                                                                                                    Pharmacy will continue to follow anti-Xa results and monitor for signs and symptoms of bleeding or thrombosis.      Thank you for the opportunity to consult on this patient.    Darlene Bazan, Pharm.D.  03/12/25  10:24 EDT

## 2025-03-12 NOTE — H&P
[facility]   [address], [address]  [place], [place], [address]    [phone]  [email or web address]           HISTORY AND PHYSICAL NOTE    Patient Care Team:  Patient Care Team:  Eddi Cheatham MD as PCP - General (Internal Medicine)      Chief complaint: NSTEMI, chest pain.        Subjective:     Cardiac focused problem list:  Coronary artery disease  Left heart catheterization at [facility] 10/30/2024: Severe three-vessel disease including 30-40% LM, 100%  of LAD, 70% diagonal branch, 70% left circumflex, 70% stenosis OM1, 95% RCA stenosis  IntraOp EMRE 1/28/2025: LVEF 38%, apex of LV akinetic/dyskinetic, no significant valvular dysfunction  CABG x 5 vessels (LIMA to LAD, GSV to PDA, GSV to OM1, GSV to OM 3, GSV to D1) on 1/28/2025  Hypertension  Hyperlipidemia  Type 2 diabetes mellitus; hemoglobin A1c 8.3% January 2025  Strong family history of CAD  History of melanoma right upper scapula  Surgical history:  Bilateral cataracts with lens implant  Cholecystectomy  Hysterectomy  Skin cancer excision  Tubal ligation  CABG x 5 vessels    HPI:      Abena Wasihngton is a 79 y.o. female.  Patient presented to [facility] on 3/09/2025 with complaints of chest pain for 3 days prior. Describes it as a midsternal pressure.  She has history of recent CABG x 5  with Dr. Del Castillo 1/31/2025.  Of note, she has not taken Plavix since discharge from CABG, only aspirin 81 mg daily.  Workup at OSH revealing elevated HS troponin of 213 and 199 respectively, proBNP 20,136.  Cardiology evaluated at OSH, suspects NSTEMI secondary to early vein graft failure and recommended invasive coronary angiography with possible intervention.  Patient and family requested transfer to [facility] as her CABG was performed here in January.      Prior to transfer, she was noted to have hypokalemia with K 2.7.  Received one dose of oral potassium 40 mEq today prior to transfer.  Also receiving Lasix 40 mg IV bid at OSH  for heart failure.  Patient currently without chest pain.  She reports she had chest pressure and shortness of breath for several days prior to arrival to [facility].  She diuresed well and has had significant improvement in her breathing.  No edema noted.     Review of Systems:  As noted in the HPI    PFSH:  Patient Active Problem List   Diagnosis    CAD s/p CABG x 5 1/28/2025    Hypertension    Hyperlipidemia    Type 2 diabetes mellitus    NSTEMI (non-ST elevated myocardial infarction)    Nonischemic nontraumatic myocardial injury       Current Facility-Administered Medications on File Prior to Encounter   Medication Dose Route Frequency Provider Last Rate Last Admin    Chlorhexidine Gluconate Cloth 2 % pads 1 Application  1 Application Topical Q12H PRN Pamela Ramirez APRN        [DISCONTINUED] acetaminophen (TYLENOL) 160 MG/5ML oral solution 650 mg  650 mg Oral Q4H PRN Alban Hernandez DO        [DISCONTINUED] acetaminophen (TYLENOL) suppository 650 mg  650 mg Rectal Q4H PRN Alban Hernandez DO        [DISCONTINUED] acetaminophen (TYLENOL) tablet 650 mg  650 mg Oral Q4H PRN Alban Hernandez DO        [DISCONTINUED] ALPRAZolam (XANAX) tablet 0.25 mg  0.25 mg Oral BID PRN Alban Hernandez DO   0.25 mg at 03/11/25 0019    [DISCONTINUED] aspirin EC tablet 81 mg  81 mg Oral Daily Alban Hernandez DO   81 mg at 03/12/25 0838    [DISCONTINUED] atorvastatin (LIPITOR) tablet 40 mg  40 mg Oral Nightly Alban Hernandez DO   40 mg at 03/11/25 2040    [DISCONTINUED] bisacodyl (DULCOLAX) EC tablet 5 mg  5 mg Oral Daily PRN Alban Hernanedz DO        [DISCONTINUED] bisacodyl (DULCOLAX) suppository 10 mg  10 mg Rectal Daily PRN Alban Hernandez DO        [DISCONTINUED] Calcium Replacement - Follow Nurse / BPA Driven Protocol   Not Applicable PRN Kermit Rojas DO        [DISCONTINUED] carvedilol (COREG) tablet 3.125 mg  3.125 mg Oral BID With Meals Jose Alfredo Marx MD   3.125 mg at 03/12/25 0838    [DISCONTINUED] clopidogrel  (PLAVIX) tablet 75 mg  75 mg Oral Daily Alban Hernandez DO   75 mg at 03/12/25 0838    [DISCONTINUED] dextrose (D50W) (25 g/50 mL) IV injection 25 g  25 g Intravenous Q15 Min PRN Kerley, Brian Joseph, DO        [DISCONTINUED] dextrose (GLUTOSE) oral gel 15 g  15 g Oral Q15 Min PRN Kerley, Brian Joseph, DO        [DISCONTINUED] empagliflozin (JARDIANCE) tablet 10 mg  10 mg Oral Daily Alban Hernandez DO   10 mg at 03/12/25 0838    [DISCONTINUED] furosemide (LASIX) injection 40 mg  40 mg Intravenous Q12H Alban Hernandez DO   40 mg at 03/12/25 0838    [DISCONTINUED] glucagon (GLUCAGEN) injection 1 mg  1 mg Intramuscular Q15 Min PRN Kerley, Brian Joseph, DO        [DISCONTINUED] heparin 34248 units/250 ml (100 units/ml) in D5W  20 Units/kg/hr Intravenous Titrated Kermit Rojas DO 9.98 mL/hr at 03/12/25 0340 20 Units/kg/hr at 03/12/25 0340    [DISCONTINUED] Insulin Lispro (humaLOG) injection 2-9 Units  2-9 Units Subcutaneous 4x Daily AC & at Bedtime Kerley, Brian Joseph, DO   4 Units at 03/12/25 0837    [DISCONTINUED] Magnesium Standard Dose Replacement - Follow Nurse / BPA Driven Protocol   Not Applicable PRN Kermit Rojas DO        [DISCONTINUED] melatonin tablet 5 mg  5 mg Oral Nightly PRN Alban Hernandez DO        [DISCONTINUED] nitroglycerin (NITROSTAT) SL tablet 0.4 mg  0.4 mg Sublingual Q5 Min PRN Alban Hernandez DO        [DISCONTINUED] ondansetron (ZOFRAN) injection 4 mg  4 mg Intravenous Q6H PRN Alban Hernandez DO        [DISCONTINUED] Pharmacy to Dose Heparin   Not Applicable Continuous PRN Clovis Winter DO        [DISCONTINUED] polyethylene glycol (MIRALAX) packet 17 g  17 g Oral Daily PRN Alban Hernandez DO        [DISCONTINUED] potassium chloride (KLOR-CON M20) CR tablet 40 mEq  40 mEq Oral Q4H Kermit Rojas DO   40 mEq at 03/12/25 1002    [DISCONTINUED] Potassium Replacement - Follow Nurse / BPA Driven Protocol   Not Applicable PRKermit Garza, DO        [DISCONTINUED]  Potassium Replacement - Follow Nurse / BPA Driven Protocol   Not Applicable PRN Kermit Rojas DO        [DISCONTINUED] sacubitril-valsartan (ENTRESTO) 24-26 MG tablet 1 tablet  1 tablet Oral Q12H Jose Alfredo Marx MD   1 tablet at 03/12/25 0838    [DISCONTINUED] sennosides-docusate (PERICOLACE) 8.6-50 MG per tablet 2 tablet  2 tablet Oral BID PRN Alban Hernandez DO        [DISCONTINUED] sodium chloride 0.9 % flush 10 mL  10 mL Intravenous Q12H Alban Hernandez DO   10 mL at 03/12/25 0839    [DISCONTINUED] sodium chloride 0.9 % flush 10 mL  10 mL Intravenous PRN Alban Hernandez DO        [DISCONTINUED] sodium chloride 0.9 % infusion 40 mL  40 mL Intravenous PRN Alban Hernandez DO         Current Outpatient Medications on File Prior to Encounter   Medication Sig Dispense Refill    alendronate (FOSAMAX) 70 MG tablet Take 1 tablet by mouth Every 7 (Seven) Days.      aspirin 325 MG tablet Take 1 tablet by mouth Daily. 30 tablet 6    atorvastatin (LIPITOR) 40 MG tablet Take 1 tablet by mouth Every Night. 90 tablet 3    carvedilol (COREG) 3.125 MG tablet Take 1 tablet by mouth 2 (Two) Times a Day With Meals for 30 days. 60 tablet 0    [START ON 3/13/2025] clopidogrel (PLAVIX) 75 MG tablet Take 1 tablet by mouth Daily. 30 tablet 0    empagliflozin (JARDIANCE) 10 MG tablet tablet Take 1 tablet by mouth Daily. 30 tablet 5    furosemide (Lasix) 20 MG tablet Take 1 tablet by mouth Daily for 30 days. 30 tablet 0    Heparin Sod, Porcine, in D5W (heparin, porcine,) 100 UNIT/ML solution in D5W infusion Infuse 998 Units/hr into a venous catheter Dose Adjusted By Provider As Needed. Indications: Cardiac or other NOT VTE      metFORMIN (GLUCOPHAGE) 1000 MG tablet Take 1 tablet by mouth 2 (Two) Times a Day With Meals.      multivitamin with minerals (Oncovite) tablet tablet Take 0.5 tablets by mouth Daily.      sacubitril-valsartan (ENTRESTO) 24-26 MG tablet Take 1 tablet by mouth Every 12 (Twelve) Hours for 30 days. 60  tablet 0    [DISCONTINUED] glipizide (GLUCOTROL) 10 MG tablet Take 2 tablets by mouth 2 (Two) Times a Day Before Meals.      [DISCONTINUED] metoprolol tartrate (LOPRESSOR) 25 MG tablet Take 0.5 tablet by mouth Every 12 (Twelve) Hours. 30 tablet 5    [DISCONTINUED] pioglitazone (ACTOS) 45 MG tablet Take 1 tablet by mouth Daily As Needed.         Social History     Socioeconomic History    Marital status:    Tobacco Use    Smoking status: Never    Smokeless tobacco: Never   Vaping Use    Vaping status: Never Used   Substance and Sexual Activity    Alcohol use: Never    Drug use: Never    Sexual activity: Defer            Objective:     Vital Sign Min/Max for last 24 hours  Temp  Min: 96.8 °F (36 °C)  Max: 98.5 °F (36.9 °C)   BP  Min: 93/56  Max: 110/63   Pulse  Min: 73  Max: 90   Resp  Min: 12  Max: 20   SpO2  Min: 94 %  Max: 94 %   No data recorded    No intake or output data in the 24 hours ending 03/12/25 1249        There were no vitals filed for this visit.  CONSTITUTIONAL: No acute distress  RESPIRATORY: Normal effort. Clear to auscultation bilaterally without wheezing or rales  CARDIOVASCULAR: Regular rate and rhythm with normal S1 and S2. Without murmur.  PERIPHERAL VASCULAR: No carotid bruit bilaterally.  Left radial Barbeau type a. There is no lower extremity edema bilaterally.    Labs and radiologic results:  Today's results were reviewed by myself.    Cardiac Data:    Results for orders placed during the hospital encounter of 03/09/25    Adult Transthoracic Echo Complete w/ Color, Spectral and Contrast if necessary per protocol    Interpretation Summary    Left ventricular systolic function is moderately decreased. Calculated left ventricular EF = 34.7% Left ventricular ejection fraction appears to be 31 - 35%.    The left ventricular cavity is mild to moderately dilated.    Left ventricular diastolic function is consistent with (grade III w/high LAP) reversible restrictive pattern.            Assessment and Plan:     Problem list:    * No active hospital problems. *      ASSESSMENT:  CAD   Status post CABG x 5, 1/28/2025, Dr. Del Castillo   Acute on chronic HFrEF   Echo 1/31/2025 with EF 40-45%, normal valves.   Echo this admission shows LVEF 31-35%, grade III diastolic dysfunction.   Receiving IV diuresis with Lasix 40 bid   Hypokalemia   Replacement at OSH with oral potassium 40 mEq x 1.   Awaiting repeat BMP   CKD stage III  Hyperlipidemia   Hypertension     PLAN:  Stat BMP to recheck renal function and electrolytes.   Electrolyte replacement per protocol.   If renal function and potassium stable, recommend proceeding with left heart cath +/- PCI via left radial approach with Dr. Segovia.  Of note, right radial  was used for last OSH cath, but switched to femoral because a small artery was cannulated (a different artery adjacent to artery; radial looked big enough on subsequent angio). Will use ultrasound to cannulate for procedure today.   The risks, benefits, and alternatives of the procedure have been reviewed and the patient wishes to proceed.   Keep npo for possible cath today.   Discontinue heparin for now.    Continue aspirin, Plavix, statin, beta blocker for CAD.   Continue Entresto, Jardiance, beta blocker for HF.   Will reassess need for additional diuresis tomorrow.   Repeat labs in am.     Electronically signed by RONA Garcia, 03/12/25, 2:30 PM EDT.    Attending addendum    I have seen and examined the patient, performing a face-to-face diagnostic evaluation with plan of care reviewed and developed with the Advanced Practice Clinician and nursing staff. I have addended and modified the above history of present illness, physical examination, and assessment and plan to include my findings and impressions. Medical decision making performed by myself, Dr. Segovia, and detailed as noted below.    Assessment:  Acute on chronic systolic heart failure  Diuresed at [facility]  Chest pain   History of  obstructive CAD status post 5V CABG 1/2025  Elevated troponin, cannot rule out type I non-STEMI at the current time  CKD  Hypokalemia  Hypertension  Dyslipidemia    Plan:  Repeat renal function and potassium levels reviewed.  Stable.  Acceptable to proceed with heart catheterization.  CAD:  Continue aspirin, clopidogrel, statin, beta-blocker  HF:  Continue beta-blocker, Entresto, Jardiance  Will reassess volume status for additional diuresis tomorrow  Repeat labs in the morning  Further recommendations to follow    Tashi Segovia MD, MSc, FACC, [facility]  Interventional Cardiology  [facility]

## 2025-03-12 NOTE — Clinical Note
The left DP pulse is detected w/ doppler. The PT pulses are detected w/ doppler bilaterally. The left radial pulse is +2. The femoral pulses are +1 bilaterally. Right DP not detectable with doppler or palpable

## 2025-03-12 NOTE — CASE MANAGEMENT/SOCIAL WORK
Case Management Discharge Note      Final Note: Pt discharged to [facility] via [facility] for a higher level of care.    Provided Post Acute Provider List?: Refused  Refused Provider List Comment: stated only have [facility] in area if needed; wants to wait and decide with dr if needed    Selected Continued Care - Discharged on 3/12/2025 Admission date: 3/9/2025 - Discharge disposition: Short Term Hospital ([place])      Destination Coordination complete.      Service Provider Services Address Phone Fax Patient Preferred    [place] [id number] [place] [phone] -- --              Durable Medical Equipment    No services have been selected for the patient.                Dialysis/Infusion    No services have been selected for the patient.                Home Medical Care    No services have been selected for the patient.                Therapy    No services have been selected for the patient.                Community Resources    No services have been selected for the patient.                Community & DME    No services have been selected for the patient.                    Selected Continued Care - Prior Encounters Includes continued care and service providers with selected services from prior encounters from 12/9/2024 to 3/12/2025      Discharged on 2/5/2025 Admission date: 1/28/2025 - Discharge disposition: Home-Health Care Svc      Home Medical Care       Service Provider Services Address Phone Fax Patient Preferred    [facility] [address] [place] [address] [phone] --                          Transportation Services  Ambulance: [facility] Status: Accepted    Final Discharge Disposition Code: 02 - short term hospital for [facility]

## 2025-03-13 ENCOUNTER — TELEPHONE (OUTPATIENT)
Dept: CARDIAC SURGERY | Facility: CLINIC | Age: 80
End: 2025-03-13
Payer: MEDICARE

## 2025-03-13 PROBLEM — I50.23 ACUTE ON CHRONIC SYSTOLIC CHF (CONGESTIVE HEART FAILURE): Status: ACTIVE | Noted: 2025-03-13

## 2025-03-13 LAB
ANION GAP SERPL CALCULATED.3IONS-SCNC: 13 MMOL/L (ref 5–15)
BUN SERPL-MCNC: 45 MG/DL (ref 8–23)
BUN/CREAT SERPL: 45.9 (ref 7–25)
CALCIUM SPEC-SCNC: 8.8 MG/DL (ref 8.6–10.5)
CHLORIDE SERPL-SCNC: 104 MMOL/L (ref 98–107)
CO2 SERPL-SCNC: 25 MMOL/L (ref 22–29)
CREAT SERPL-MCNC: 0.98 MG/DL (ref 0.57–1)
EGFRCR SERPLBLD CKD-EPI 2021: 59.6 ML/MIN/1.73
GLUCOSE BLDC GLUCOMTR-MCNC: 274 MG/DL (ref 70–130)
GLUCOSE BLDC GLUCOMTR-MCNC: 296 MG/DL (ref 70–130)
GLUCOSE BLDC GLUCOMTR-MCNC: 318 MG/DL (ref 70–130)
GLUCOSE BLDC GLUCOMTR-MCNC: 388 MG/DL (ref 70–130)
GLUCOSE BLDC GLUCOMTR-MCNC: 393 MG/DL (ref 70–130)
GLUCOSE SERPL-MCNC: 246 MG/DL (ref 65–99)
NT-PROBNP SERPL-MCNC: 7053 PG/ML (ref 0–1800)
POTASSIUM SERPL-SCNC: 3.6 MMOL/L (ref 3.5–5.2)
POTASSIUM SERPL-SCNC: 4.5 MMOL/L (ref 3.5–5.2)
SODIUM SERPL-SCNC: 142 MMOL/L (ref 136–145)

## 2025-03-13 PROCEDURE — 99232 SBSQ HOSP IP/OBS MODERATE 35: CPT | Performed by: INTERNAL MEDICINE

## 2025-03-13 PROCEDURE — 82948 REAGENT STRIP/BLOOD GLUCOSE: CPT

## 2025-03-13 PROCEDURE — 80048 BASIC METABOLIC PNL TOTAL CA: CPT | Performed by: INTERNAL MEDICINE

## 2025-03-13 PROCEDURE — 84132 ASSAY OF SERUM POTASSIUM: CPT | Performed by: INTERNAL MEDICINE

## 2025-03-13 PROCEDURE — 83880 ASSAY OF NATRIURETIC PEPTIDE: CPT | Performed by: INTERNAL MEDICINE

## 2025-03-13 PROCEDURE — 63710000001 INSULIN LISPRO (HUMAN) PER 5 UNITS: Performed by: INTERNAL MEDICINE

## 2025-03-13 RX ORDER — FERROUS SULFATE 325(65) MG
325 TABLET ORAL
COMMUNITY

## 2025-03-13 RX ORDER — SERTRALINE HYDROCHLORIDE 25 MG/1
25 TABLET, FILM COATED ORAL DAILY
COMMUNITY

## 2025-03-13 RX ORDER — LISINOPRIL 5 MG/1
5 TABLET ORAL DAILY
COMMUNITY
End: 2025-03-14 | Stop reason: HOSPADM

## 2025-03-13 RX ORDER — POTASSIUM CHLORIDE 1500 MG/1
20 TABLET, EXTENDED RELEASE ORAL DAILY
COMMUNITY
End: 2025-03-14 | Stop reason: HOSPADM

## 2025-03-13 RX ORDER — GABAPENTIN 300 MG/1
300 CAPSULE ORAL 2 TIMES DAILY
COMMUNITY

## 2025-03-13 RX ORDER — PANTOPRAZOLE SODIUM 40 MG/1
40 TABLET, DELAYED RELEASE ORAL DAILY
COMMUNITY

## 2025-03-13 RX ORDER — POTASSIUM CHLORIDE 1500 MG/1
40 TABLET, EXTENDED RELEASE ORAL EVERY 4 HOURS
Status: COMPLETED | OUTPATIENT
Start: 2025-03-13 | End: 2025-03-13

## 2025-03-13 RX ORDER — ACETAMINOPHEN 500 MG
500 TABLET ORAL EVERY MORNING
COMMUNITY

## 2025-03-13 RX ADMIN — Medication 10 ML: at 08:45

## 2025-03-13 RX ADMIN — INSULIN LISPRO 7 UNITS: 100 INJECTION, SOLUTION INTRAVENOUS; SUBCUTANEOUS at 20:26

## 2025-03-13 RX ADMIN — EMPAGLIFLOZIN 10 MG: 10 TABLET, FILM COATED ORAL at 08:45

## 2025-03-13 RX ADMIN — ASPIRIN 81 MG: 81 TABLET, COATED ORAL at 08:43

## 2025-03-13 RX ADMIN — CLOPIDOGREL BISULFATE 75 MG: 75 TABLET ORAL at 08:44

## 2025-03-13 RX ADMIN — INSULIN LISPRO 8 UNITS: 100 INJECTION, SOLUTION INTRAVENOUS; SUBCUTANEOUS at 11:56

## 2025-03-13 RX ADMIN — ATORVASTATIN CALCIUM 40 MG: 40 TABLET, FILM COATED ORAL at 20:26

## 2025-03-13 RX ADMIN — CARVEDILOL 3.12 MG: 3.12 TABLET, FILM COATED ORAL at 08:44

## 2025-03-13 RX ADMIN — SACUBITRIL AND VALSARTAN 1 TABLET: 24; 26 TABLET, FILM COATED ORAL at 08:44

## 2025-03-13 RX ADMIN — INSULIN LISPRO 6 UNITS: 100 INJECTION, SOLUTION INTRAVENOUS; SUBCUTANEOUS at 08:44

## 2025-03-13 RX ADMIN — INSULIN LISPRO 8 UNITS: 100 INJECTION, SOLUTION INTRAVENOUS; SUBCUTANEOUS at 17:16

## 2025-03-13 RX ADMIN — SACUBITRIL AND VALSARTAN 1 TABLET: 24; 26 TABLET, FILM COATED ORAL at 20:26

## 2025-03-13 RX ADMIN — POTASSIUM CHLORIDE 40 MEQ: 20 TABLET, EXTENDED RELEASE ORAL at 15:16

## 2025-03-13 RX ADMIN — POTASSIUM CHLORIDE 40 MEQ: 20 TABLET, EXTENDED RELEASE ORAL at 11:32

## 2025-03-13 NOTE — CASE MANAGEMENT/SOCIAL WORK
Discharge Planning Assessment  [facility]     Patient Name: Abena Washington  MRN: [id number]  Today's Date: 3/13/2025    Admit Date: 3/12/2025    Plan: discharge plan   Discharge Needs Assessment       Row Name 03/13/25 1424       Living Environment    People in Home child(jeet), adult    Name(s) of People in Home Vero Buck(lives with daughter)    Current Living Arrangements home    Primary Care Provided by self    Provides Primary Care For no one, unable/limited ability to care for self    Family Caregiver if Needed child(jeet), adult    Family Caregiver Names Vero Buck(daughter), Radha Frederick(daughter) and Dave(son)    Quality of Family Relationships supportive;involved;helpful    Able to Return to Prior Arrangements yes    Living Arrangement Comments I spoke with pt and pt's son, Dave in room with permission to initiate discharge plan. [place] daughterVero.       Transition Planning    Patient/Family Anticipates Transition to home with family    Patient/Family Anticipated Services at Transition     Transportation Anticipated family or friend will provide       Discharge Needs Assessment    Equipment Currently Used at Home cane, quad;walker, rolling;shower chair;grab bar    Concerns to be Addressed discharge planning    Equipment Needed After Discharge cane, quad;grab bar, toilet;grab bar, tub/shower;shower chair;walker, rolling    Discharge Coordination/Progress Pt confirms that she has [employer] Medicare Replacement insurance with perscripton coverage and uses a mail order pharmacy or [facility] in [place]. PT has a history of CABG on 1/6/2025 at [facility]. She reports she went home with [facility] but never used their services. Pt is here with acute on chronic systolic CHF and currently on room air. Pt is independent with ADLS(toileting, bathig, dressng, feeding self). Daughter fixes meals, manages meds and provides transportation. Pt states she has a RW anc  cane but doesnt really use them. Discharge plan is home with daughter. Pt does not anctipate discharge needs at this time. Family will provide transportation home.                   Discharge Plan       Row Name 03/13/25 1436       Plan    Plan discharge plan    Plan Comments Discharge plan is home with daughter. Pt does not anctipate discharge needs at this time. Family will provide transportation home.    Final Discharge Disposition Code 01 - home or self-care                  Selected Continued Care - Prior Encounters Includes continued care and service providers with selected services from prior encounters from 12/12/2024 to 3/13/2025      Discharged on 3/12/2025 Admission date: 3/9/2025 - Discharge disposition: Short Term Hospital ([place])      Destination       Service Provider Services Address Phone Fax Patient Preferred    [place] [id number] [phone] -- --                      Discharged on 2/5/2025 Admission date: 1/28/2025 - Discharge disposition: Home-Health Care [facility]      Home Medical Care       Service Provider Services Address Phone Fax Patient Preferred    [facility] [address] [place] [address] [phone] --                             Demographic Summary       Row Name 03/13/25 1422       General Information    General Information Comments PCP is COLTEN CALDWELL       Contact Information    Permission Granted to Share Info With     Contact Information Obtained for     Contact Information Comments Vero Buck(daughter) [phone] or Radha Frederick(daughter) [phone]                   Functional Status    No documentation.                  Psychosocial    No documentation.                  Abuse/Neglect    No documentation.                  Legal    No documentation.                  Substance Abuse    No documentation.                  Patient Forms     No documentation.                     Keli Ng RN

## 2025-03-13 NOTE — PROGRESS NOTES
Malnutrition Severity Assessment    Patient Name:  Abena Washington  YOB: 1945  MRN: [id number]  Admit Date:  3/12/2025    Patient meets criteria for : Severe Malnutrition    Malnutrition Severity Assessment  Malnutrition Type: Acute Disease or Injury - Related Malnutrition  Malnutrition Type (Last 8 Hours)       Malnutrition Severity Assessment       Row Name 03/13/25 [facility]       Malnutrition Severity Assessment    Malnutrition Type Acute Disease or Injury - Related Malnutrition      Row Name 03/13/25 [facility]       Insufficient Energy Intake     Insufficient Energy Intake Findings Moderate    Insufficient Energy Intake  <75% of est. energy requirement for >7d)      Row Name 03/13/25 135       Unintentional Weight Loss     Unintentional Weight Loss Findings Severe    Unintentional Weight Loss  Weight loss greater than 5% in one month      Row Name 03/13/25 [facility]       Muscle Loss    Loss of Muscle Mass Findings Moderate    Sabianist Region Moderate - slight depression    Clavicle Bone Region Moderate - some protrusion in females, visible in males    Acromion Bone Region Moderate - acromion may slightly protrude    Dorsal Hand Region Moderate - slight depression    Patellar Region Moderate - patella more prominent, less muscle definition around patella    Anterior Thigh Region Moderate - mild depression on inner thigh    Posterior Calf Region Moderate - some roundness, slight firmness      Row Name 03/13/25 [facility]       Fat Loss    Subcutaneous Fat Loss Findings Moderate    Orbital Region  Moderate -  somewhat hollowness, slightly dark circles    Upper Arm Region Moderate - some fat tissue, not ample      Row Name 03/13/25 [facility]       Declining Functional Status    Declining Functional Status Findings N/A      Row Name 03/13/25 [facility]       Criteria Met (Must meet criteria for severity in at least 2 of these categories: M Wasting, Fat Loss, Fluid, Secondary Signs, Wt. Status, Intake)    Patient meets criteria for   Severe Malnutrition                    Electronically signed by:  Parul Philippe MS,RD,LD  03/13/25 13:52 EDT

## 2025-03-13 NOTE — PROGRESS NOTES
"          Clinical Nutrition Assessment   Patient Name: Abena Washington  YOB: 1945  MRN: [id number]  Date of Encounter: 03/13/25 10:33 EDT  Admission date: 3/12/2025  Reason for Visit: MST score 2+, Unintentional weight loss, Reduced oral intake    Assessment   Nutrition Assessment   Admission Diagnosis:  Acute on chronic systolic CHF (congestive heart failure) [I50.23]    Problem List:    Acute on chronic systolic CHF (congestive heart failure)      PMH:   She  has a past medical history of Anemia, Anxiety and depression, Arthritis, Cataracts, bilateral, Coronary artery disease, Delayed emergence from anesthesia, Diabetes mellitus, Heart attack, Hyperlipidemia, Hypertension, Nausea, Peripheral neuropathy, PONV (postoperative nausea and vomiting), Skin melanoma, and Wears glasses.    PSH:  She  has a past surgical history that includes Cholecystectomy; Hysterectomy; Tubal ligation; Colonoscopy; Esophagogastroduodenoscopy; Cataract extraction w/ intraocular lens implant (Left, 06/21/2023); Cataract extraction w/ intraocular lens implant (Right, 07/07/2023); Cardiac catheterization; Skin cancer excision; Coronary artery bypass graft (N/A, 1/28/2025); and Cardiac catheterization (N/A, 3/12/2025).    Applicable Nutrition History:   HTN/HLD  HFrEF  T2DM  CAD s/p CABG x5 1/28/25  CKD3  Anxiety/depression    Anthropometrics   Height: Height: 157.5 cm (62\")  Last Filed Weight: Weight: 47.8 kg (105 lb 6.4 oz) (03/12/25 1248)  Method: Weight Method: Standing scale  BMI: BMI (Calculated): 19.3    UBW:   Weight      Weight (kg) Weight (lbs) Weight Method   7/5/2023 56.7 kg  125 lb  Stated    1/15/2025 53.797 kg  118 lb 9.6 oz     1/27/2025 54.7 kg  120 lb 9.5 oz  Standing scale    1/29/2025 54.7 kg  120 lb 9.5 oz     3/9/2025 49.896 kg  110 lb  Standing scale    3/10/2025 49.896 kg  110 lb     3/12/2025 47.809 kg  105 lb 6.4 oz  Standing scale      Weight change: weight loss of 15 lbs (12.5%) over 1 month(s)    " Significant?  Yes    Nutrition Focused Physical Exam    Date: 03/13/25     Pt does not meet criteria for malnutrition diagnosis, at this time.      Subjective   Reported/Observed/Food/Nutrition Related History:   03/13/25  Patient screened per nutrition protocol for MST2 or greater. Presented for NSTEMI. Endorsed good appetite and PO intake. Stated her appetite had been decreased since her previous surgery as nothing seemed to taste right. However, has been able to eat well now. Patient confirms significant weight loss. Drinks one glucerna per day, agreeable to trial boost glucose karoline per day. Declined any chewing or swallowing difficulties. NKFA.    Current Nutrition Prescription   PO: Diet: Cardiac, Diabetic; Healthy Heart (2-3 Na+); Consistent Carbohydrate; Fluid Consistency: Thin (IDDSI 0)  Oral Nutrition Supplement: n/a  Intake: 3/11 B 75, L 100%; 3/12 B 100% PO intake documented    Assessment & Plan   Nutrition Diagnosis   Date:   Updated:  Problem Malnutrition, acute severe   Etiology Dysgeusia 2/2 recent CABG   Signs/Symptoms <75% of EEN x>7d, significant weight loss of >5% x 1mo, moderate muscle wasting, and moderate subcutaneous fat loss   Status: New    Goal:   Nutrition to support treatment and Increase intake, Maintain intake    Nutrition Intervention      Follow treatment progress, Care plan reviewed, Interview for preferences, Encourage intake, Supplement provided    Nutrition POC  Encourage adequate PO intake as able  Ordering boost glucose chocolate once per day    Monitoring/Evaluation:   Per protocol, PO intake, Supplement intake, Weight, Symptoms, POC/GOC    Parul Philippe MS,RD,LD  Time Spent: 30min

## 2025-03-13 NOTE — PROGRESS NOTES
[facility] at [facility]  INPATIENT PROGRESS NOTE         [facility] 6A    3/13/2025      PATIENT IDENTIFICATION:   Name:  Abena Washington      MRN:  [id number]     79 y.o.  female             Reason for visit: Acute on chronic HFrEF, multivessel CAD, acute on chronic CKD      SUBJECTIVE:   Denies angina this morning, on room air in bed but states she is quite weak has not gotten out of bed much since hospitalized in [place].     OBJECTIVE:  Vitals:    03/12/25 2300 03/12/25 2315 03/12/25 2330 03/13/25 0419   BP: 113/58 102/57 98/56 111/60   BP Location:   Left arm Right arm   Patient Position:   Lying Lying   Pulse: 84 89 85 72   Resp:   15 17   Temp:   98.1 °F (36.7 °C) 97.6 °F (36.4 °C)   TempSrc:   Oral Oral   SpO2: 93% 94% 93% 95%   Weight:       Height:               Body mass index is 19.28 kg/m².    Intake/Output Summary (Last 24 hours) at 3/13/2025 0840  Last data filed at 3/13/2025 0419  Gross per 24 hour   Intake --   Output 500 ml   Net -500 ml       Telemetry: Personally reviewed, normal sinus rhythm, no arrhythmia     Exam:  General Appearance:   well developed  well nourished  Neck:  thyroid not enlarged  supple  Respiratory:  no respiratory distress  normal breath sounds  no rales  Cardiovascular:  no jugular venous distention  regular rhythm  apical impulse normal  S1 normal, S2 normal  no S3, no S4   no murmur  no rub, no thrill  carotid pulses normal; no bruit  pedal pulses normal  lower extremity edema: none    Left radial cath site clean dry and intact no hematoma or ecchymosis, left radial pulse 2+  Skin:   warm, dry      No Known Allergies  Scheduled meds:       aspirin, 81 mg, Oral, Daily  aspirin, 81 mg, Oral, Daily  atorvastatin, 40 mg, Oral, Nightly  carvedilol, 3.125 mg, Oral, BID With Meals  clopidogrel, 75 mg, Oral, Daily  empagliflozin, 10 mg, Oral, Daily  insulin lispro, 2-9 Units, Subcutaneous, 4x Daily AC & at Bedtime  sacubitril-valsartan, 1  "tablet, Oral, Q12H  sodium chloride, 10 mL, Intravenous, Q12H      IV meds:                         Data Review:  Results from last 7 days   Lab Units 03/12/25  1524 03/12/25  1300 03/12/25  0613 03/10/25  0528 03/09/25  1844   SODIUM mmol/L  --  138 138 140 137   BUN mg/dL  --  62* 58* 22 25*   CREATININE mg/dL 1.30 1.17* 1.17* 0.86 1.05*   GLUCOSE mg/dL  --  400* 280* 197* 303*     Results from last 7 days   Lab Units 03/12/25  1524 03/12/25  0613 03/10/25  0528 03/09/25  1844   WBC 10*3/mm3  --  9.24 9.91 10.36   HEMOGLOBIN g/dL  --  12.6 11.4* 12.7   HEMOGLOBIN, POC g/dL 13.3  --   --   --      Results from last 7 days   Lab Units 03/09/25  2251   INR  1.10     Results from last 7 days   Lab Units 03/09/25  1844   ALT (SGPT) U/L 25   AST (SGOT) U/L 34*     No results found for: \"DIGOXIN\"   No results found for: \"TSH\"  Results from last 7 days   Lab Units 03/10/25  0528   CHOLESTEROL mg/dL 177   HDL CHOL mg/dL 62*       Estimated Creatinine Clearance: 27.3 mL/min (by C-G formula based on SCr of 1.3 mg/dL).        Imaging (last 24 hr):   I personally reviewed the most recent chest x-ray and other pertinent imaging studies.  Results for orders placed during the hospital encounter of 03/09/25    XR Chest 1 View    Narrative  PROCEDURE: XR CHEST 1 VW-    HISTORY: Chest pressure, recent CABG, chest pain    COMPARISON: February 5, 2025.    FINDINGS: The patient is rotated to the left. The heart is mildly  enlarged, but stable. The patient is status post median sternotomy for  CABG. Small bilateral pleural effusions are seen, worsened on the right.  There is worsening left basilar atelectasis or infiltrate. There is  worsening interstitial disease. There is no pneumothorax. There are no  acute osseous abnormalities.    Impression  Findings compatible with fluid overload/worsening CHF.  Continued follow-up is recommended.      Images were reviewed, interpreted, and dictated by Dr. Karina Phelps MD  Transcribed by Christina" RASHEED Tavarez.    This report was signed and finalized on 3/10/2025 9:13 AM by Karina Phelps MD.        Last ECHO:  Results for orders placed during the hospital encounter of 03/09/25    Adult Transthoracic Echo Complete w/ Color, Spectral and Contrast if necessary per protocol    Interpretation Summary    Left ventricular systolic function is moderately decreased. Calculated left ventricular EF = 34.7% Left ventricular ejection fraction appears to be 31 - 35%.    The left ventricular cavity is mild to moderately dilated.    Left ventricular diastolic function is consistent with (grade III w/high LAP) reversible restrictive pattern.        PROBLEM LIST:     Acute on chronic systolic CHF (congestive heart failure)        Initial cardiac assessment: 79-year-old female with 5V CABG 1/2025, represented to [facility] with chest discomfort and shortness of breath, transferred to [facility] for cath which showed 2/5 patent grafts    ASSESSMENT/PLAN:  1.  Multivessel CAD:  Personally reviewed cath images agree with . Faith there are no good targets for percutaneous intervention, her targets are very small in the circumflex system, she has collateral filling from LAD.  Patent LIMA-LAD and SVG-RPDA    Continue aspirin, atorvastatin, carvedilol    As needed nitroglycerin will be prescribed at discharge    If she has angina, will consider Imdur versus Ranexa depending on blood pressure    2.  Acute on CKD stage IIIb  Awaiting morning labs  Monitor closely, hold Entresto if creatinine rises  May need nephrology consult if worsening renal function  Avoid nephrotoxic agents.    3.  Acute on chronic HFrEF:  EF worsened on echo 3/10/2025 at [facility]  Continue GDMT with carvedilol, Jardiance, and Entresto  May consider adding low-dose spironolactone pending renal panel this morning    4.  Debility:  PT consult today    Pending PT recommendations and renal function possible discharge on 3/14/2025    Discussed with  patient's family      Sravan Arboleda MD  3/13/2025    08:40 EDT

## 2025-03-13 NOTE — TELEPHONE ENCOUNTER
Mrs. Washington's daughter called asking sutures can be removed while patient is readmitted.   Per Ana Maria, would prefer sutures to stay in until visit with her next week.

## 2025-03-14 ENCOUNTER — READMISSION MANAGEMENT (OUTPATIENT)
Dept: CALL CENTER | Facility: HOSPITAL | Age: 80
End: 2025-03-14
Payer: MEDICARE

## 2025-03-14 VITALS
WEIGHT: 109.2 LBS | SYSTOLIC BLOOD PRESSURE: 119 MMHG | TEMPERATURE: 98.4 F | RESPIRATION RATE: 18 BRPM | HEART RATE: 71 BPM | DIASTOLIC BLOOD PRESSURE: 70 MMHG | OXYGEN SATURATION: 98 % | HEIGHT: 62 IN | BODY MASS INDEX: 20.09 KG/M2

## 2025-03-14 LAB
ANION GAP SERPL CALCULATED.3IONS-SCNC: 12 MMOL/L (ref 5–15)
BUN SERPL-MCNC: 36 MG/DL (ref 8–23)
BUN/CREAT SERPL: 46.8 (ref 7–25)
CALCIUM SPEC-SCNC: 8 MG/DL (ref 8.6–10.5)
CHLORIDE SERPL-SCNC: 105 MMOL/L (ref 98–107)
CO2 SERPL-SCNC: 21 MMOL/L (ref 22–29)
CREAT SERPL-MCNC: 0.77 MG/DL (ref 0.57–1)
EGFRCR SERPLBLD CKD-EPI 2021: 79.6 ML/MIN/1.73
GLUCOSE BLDC GLUCOMTR-MCNC: 194 MG/DL (ref 70–130)
GLUCOSE BLDC GLUCOMTR-MCNC: 279 MG/DL (ref 70–130)
GLUCOSE SERPL-MCNC: 366 MG/DL (ref 65–99)
POTASSIUM SERPL-SCNC: 4.6 MMOL/L (ref 3.5–5.2)
SODIUM SERPL-SCNC: 138 MMOL/L (ref 136–145)

## 2025-03-14 PROCEDURE — 80048 BASIC METABOLIC PNL TOTAL CA: CPT | Performed by: PHYSICIAN ASSISTANT

## 2025-03-14 PROCEDURE — 99239 HOSP IP/OBS DSCHRG MGMT >30: CPT | Performed by: PHYSICIAN ASSISTANT

## 2025-03-14 PROCEDURE — 97530 THERAPEUTIC ACTIVITIES: CPT

## 2025-03-14 PROCEDURE — 97161 PT EVAL LOW COMPLEX 20 MIN: CPT

## 2025-03-14 PROCEDURE — 63710000001 INSULIN LISPRO (HUMAN) PER 5 UNITS: Performed by: INTERNAL MEDICINE

## 2025-03-14 PROCEDURE — 82948 REAGENT STRIP/BLOOD GLUCOSE: CPT

## 2025-03-14 RX ORDER — SACUBITRIL AND VALSARTAN 24; 26 MG/1; MG/1
1 TABLET, FILM COATED ORAL EVERY 12 HOURS SCHEDULED
Qty: 60 TABLET | Refills: 5 | Status: SHIPPED | OUTPATIENT
Start: 2025-03-14

## 2025-03-14 RX ORDER — SPIRONOLACTONE 25 MG/1
25 TABLET ORAL DAILY
Qty: 30 TABLET | Refills: 2 | Status: SHIPPED | OUTPATIENT
Start: 2025-03-14

## 2025-03-14 RX ORDER — SPIRONOLACTONE 25 MG/1
25 TABLET ORAL DAILY
Status: DISCONTINUED | OUTPATIENT
Start: 2025-03-14 | End: 2025-03-14 | Stop reason: HOSPADM

## 2025-03-14 RX ORDER — FUROSEMIDE 20 MG/1
20 TABLET ORAL DAILY PRN
Qty: 30 TABLET | Refills: 1 | Status: SHIPPED | OUTPATIENT
Start: 2025-03-14

## 2025-03-14 RX ORDER — NITROGLYCERIN 0.4 MG/1
0.4 TABLET SUBLINGUAL
Qty: 25 TABLET | Refills: 0 | Status: SHIPPED | OUTPATIENT
Start: 2025-03-14

## 2025-03-14 RX ORDER — CARVEDILOL 3.12 MG/1
3.12 TABLET ORAL 2 TIMES DAILY WITH MEALS
Qty: 60 TABLET | Refills: 11 | Status: SHIPPED | OUTPATIENT
Start: 2025-03-14 | End: 2025-03-19 | Stop reason: SINTOL

## 2025-03-14 RX ORDER — CLOPIDOGREL BISULFATE 75 MG/1
75 TABLET ORAL DAILY
Qty: 90 TABLET | Refills: 3 | Status: SHIPPED | OUTPATIENT
Start: 2025-03-14

## 2025-03-14 RX ORDER — ASPIRIN 81 MG/1
81 TABLET ORAL DAILY
Qty: 90 TABLET | Refills: 3 | Status: SHIPPED | OUTPATIENT
Start: 2025-03-15

## 2025-03-14 RX ADMIN — ASPIRIN 81 MG: 81 TABLET, COATED ORAL at 08:14

## 2025-03-14 RX ADMIN — INSULIN LISPRO 2 UNITS: 100 INJECTION, SOLUTION INTRAVENOUS; SUBCUTANEOUS at 08:13

## 2025-03-14 RX ADMIN — INSULIN LISPRO 6 UNITS: 100 INJECTION, SOLUTION INTRAVENOUS; SUBCUTANEOUS at 11:38

## 2025-03-14 RX ADMIN — CLOPIDOGREL BISULFATE 75 MG: 75 TABLET ORAL at 08:14

## 2025-03-14 RX ADMIN — CARVEDILOL 3.12 MG: 3.12 TABLET, FILM COATED ORAL at 08:13

## 2025-03-14 RX ADMIN — EMPAGLIFLOZIN 10 MG: 10 TABLET, FILM COATED ORAL at 08:13

## 2025-03-14 RX ADMIN — SPIRONOLACTONE 25 MG: 25 TABLET ORAL at 11:39

## 2025-03-14 RX ADMIN — SACUBITRIL AND VALSARTAN 1 TABLET: 24; 26 TABLET, FILM COATED ORAL at 08:14

## 2025-03-14 NOTE — CASE MANAGEMENT/SOCIAL WORK
Case Management Discharge Note      Final Note: Per cardiology, pt is medically ready for discharge today and the plan is home. Pt/daughter are requesting HH with [facility] and a referral was made to [facility] for skilled nursing, PT and OT. I spoke with pt and pt's daughter Vero in room regarding discharge plan. I also spoke with Shazia in intake at [facility] and she accepted referral and is  aware pt will be staying with daughter, Vero accross the street. Shazia reports they have Vero's address on file as that is where the pt stayed the last time [facility] visited Per Shazia, [facility] will see pt on Monday but will call prior to visit. [facility] will be providing transportation home. Both pt and daughter are agreeable to discharge plan.         Selected Continued Care - Admitted Since 3/12/2025       Destination    No services have been selected for the patient.                Durable Medical Equipment    No services have been selected for the patient.                Dialysis/Infusion    No services have been selected for the patient.                Home Medical Care       Service Provider Services Address Phone Fax Patient Preferred    [facility] [address] [place] [id number] [phone] --              Therapy    No services have been selected for the patient.                Community Resources    No services have been selected for the patient.                Community & DME    No services have been selected for the patient.                    Selected Continued Care - Prior Encounters Includes continued care and service providers with selected services from prior encounters from 12/12/2024 to 3/14/2025      Discharged on 3/12/2025 Admission date: 3/9/2025 - [facility]charge disposition: [facility] ([place])      Destination       Service Provider Services Address Phone Fax Patient Preferred    [facility]  [address] [address] [phone] -- --                      Discharged on 2/5/2025 Admission date: 1/28/2025 - Discharge disposition: Home-Health Care [facility]      Home Medical Care       Service Provider Services Address Phone Fax Patient Preferred    [facility] [address] [facility] [place] [address] [phone] --                               Final Discharge Disposition Code: 06 - home with home health care

## 2025-03-14 NOTE — PLAN OF CARE
Goal Outcome Evaluation:         Pt will continue to receive medicinal interventions regarding cardiac vasculature inpatient and post-visit.  Urology labs WDL.  Anticipating PT consult later today to finalize discharge planning.

## 2025-03-14 NOTE — THERAPY EVALUATION
Patient Name: Abena Washington  : 1945    MRN: [id number]                              Today's Date: 3/14/2025       Admit Date: 3/12/2025    Visit Dx: No diagnosis found.  Patient Active Problem List   Diagnosis    CAD s/p CABG x 5 2025    Hypertension    Hyperlipidemia    Type 2 diabetes mellitus    NSTEMI (non-ST elevated myocardial infarction)    Nonischemic nontraumatic myocardial injury    Acute on chronic systolic CHF (congestive heart failure)     Past Medical History:   Diagnosis Date    Anemia     Anxiety and depression     Arthritis     Cataracts, bilateral     Coronary artery disease     Delayed emergence from anesthesia     Diabetes mellitus     Heart attack     Hyperlipidemia     Hypertension     Nausea     Peripheral neuropathy     PONV (postoperative nausea and vomiting)     Skin melanoma     Wears glasses      Past Surgical History:   Procedure Laterality Date    CARDIAC CATHETERIZATION      CARDIAC CATHETERIZATION N/A 3/12/2025    Procedure: Left Heart Cath - Left radial access;  Surgeon: Tashi Segovia MD;  Location: [facility] CATH INVASIVE LOCATION;  Service: Cardiovascular;  Laterality: N/A;    CATARACT EXTRACTION W/ INTRAOCULAR LENS IMPLANT Left 2023    Procedure: CATARACT PHACO EXTRACTION WITH INTRAOCULAR LENS IMPLANT LEFT;  Surgeon: Mina Hendrickson MD;  Location: [facility] OR;  Service: Ophthalmology;  Laterality: Left;    CATARACT EXTRACTION W/ INTRAOCULAR LENS IMPLANT Right 2023    Procedure: CATARACT PHACO EXTRACTION WITH INTRAOCULAR LENS IMPLANT RIGHT;  Surgeon: Mina Hendrickson MD;  Location: [facility] OR;  Service: Ophthalmology;  Laterality: Right;    CHOLECYSTECTOMY      COLONOSCOPY      CORONARY ARTERY BYPASS GRAFT N/A 2025    Procedure: MEDIAN STERNOTOMY, CORONARY ARTERY BYPASS GRAFTING X 5, UTILIZING THE LEFT INTERNAL MAMMARY ARTERY, ENDOSCOPIC VEIN HARVESTING OF THE LEFT SAPHENOUS VEIN, TRANSESOPHAGEAL ECHOCARDIOGRAM WITH ANESTHESIA;  Surgeon: Magdalene  Zeferino HARRIS MD;  Location: [facility];  Service: Cardiothoracic;  Laterality: N/A;    ENDOSCOPY      HYSTERECTOMY      SKIN CANCER EXCISION      TUBAL ABDOMINAL LIGATION        General Information       Row Name 03/14/25 0900          Physical Therapy Time and Intention    Document Type evaluation  -ER     Mode of Treatment physical therapy  -ER       Row Name 03/14/25 0900          General Information    Patient Profile Reviewed yes  -ER     Prior Level of Function independent:;all household mobility;gait;transfer;bed mobility;min assist:;bathing;dressing;grooming;dependent:;driving;cleaning;cooking;home management  has a walker, doesnt use  -ER     Existing Precautions/Restrictions fall;cardiac  hx of open heart sx, L heart cath  -ER     Barriers to Rehab medically complex;previous functional deficit  -ER       Row Name 03/14/25 0900          Living Environment    Current Living Arrangements home  -ER     People in Home child(jeet), adult  -ER       Row Name 03/14/25 0900          Home Main Entrance    Number of Stairs, Main Entrance none  -ER       Row Name 03/14/25 0900          Stairs Within Home, Primary    Number of Stairs, Within Home, Primary none  -ER       Row Name 03/14/25 0900          Cognition    Orientation Status (Cognition) oriented x 4  -ER       Row Name 03/14/25 0900          Safety Issues/Impairments Affecting Functional Mobility    Safety Issues Affecting Function (Mobility) awareness of need for assistance;insight into deficits/self-awareness;safety precautions follow-through/compliance  -ER     Impairments Affecting Function (Mobility) balance;endurance/activity tolerance  -ER               User Key  (r) = Recorded By, (t) = Taken By, (c) = Cosigned By      Initials Name Provider Type    ER Gabriela Schrader, PT Physical Therapist                   Mobility       Row Name 03/14/25 0903          Bed Mobility    Bed Mobility rolling left;supine-sit  -ER     Rolling Left [place] (Bed Mobility)  standby assist  -ER     Supine-Sit [place] (Bed Mobility) standby assist  -ER     Assistive Device (Bed Mobility) bed rails;head of bed elevated  -ER     Comment, (Bed Mobility) HOB elevated, extra time required  -ER       Row Name 03/14/25 0903          Bed-Chair Transfer    Bed-Chair [place] (Transfers) standby assist  -ER       Row Name 03/14/25 0903          Sit-Stand Transfer    Sit-Stand [place] (Transfers) standby assist  -ER     Comment, (Sit-Stand Transfer) 2x  -ER       Row Name 03/14/25 0903          Gait/Stairs (Locomotion)    [place] Level (Gait) contact guard  -ER     Patient was able to Ambulate yes  -ER     Distance in Feet (Gait) 3  +20  -ER     Deviations/Abnormal Patterns (Gait) ataxic;gait speed decreased  -ER     Bilateral Gait Deviations forward flexed posture  -ER     Comment, (Gait/Stairs) some hesitancy w ambulation today, good turns, no LOB, no use of AD  -ER               User Key  (r) = Recorded By, (t) = Taken By, (c) = Cosigned By      Initials Name Provider Type    ER Gabriela Schrader, PT Physical Therapist                   Obj/Interventions       Row Name 03/14/25 0904          Range of Motion Comprehensive    General Range of Motion no range of motion deficits identified  -ER       Row Name 03/14/25 0904          Strength Comprehensive (MMT)    General Manual Muscle Testing (MMT) Assessment no strength deficits identified  -ER     Comment, General Manual Muscle Testing (MMT) Assessment gross 4/5 BLE  -ER       Row Name 03/14/25 0904          Balance    Balance Assessment sitting static balance;sitting dynamic balance;standing static balance;standing dynamic balance  -ER     Static Sitting Balance independent  -ER     Dynamic Sitting Balance independent  -ER     Position, Sitting Balance unsupported;sitting edge of bed  -ER     Static Standing Balance standby assist  -ER     Dynamic Standing Balance contact guard  -ER     Position/Device Used, Standing Balance  unsupported  -ER     Balance Interventions sitting;sit to stand;standing;supported;static;dynamic;minimal challenge  -ER       Row Name 03/14/25 0904          Sensory Assessment (Somatosensory)    Sensory Assessment (Somatosensory) other (see comments)  BLE foot neuropathy, baseline  -ER               User Key  (r) = Recorded By, (t) = Taken By, (c) = Cosigned By      Initials Name Provider Type    ER Gabriela Schrader, PT Physical Therapist                   Goals/Plan       Row Name 03/14/25 0908          Bed Mobility Goal 1 (PT)    Activity/Assistive Device (Bed Mobility Goal 1, PT) bridging;rolling to left;rolling to right;scooting;sit to supine;supine to sit  -ER     [place] Level/Cues Needed (Bed Mobility Goal 1, PT) modified independence  -ER     Time Frame (Bed Mobility Goal 1, PT) short term goal (STG);5 days  -ER       Row Name 03/14/25 0908          Transfer Goal 1 (PT)    Activity/Assistive Device (Transfer Goal 1, PT) sit-to-stand/stand-to-sit;bed-to-chair/chair-to-bed;toilet  -ER     [place] Level/Cues Needed (Transfer Goal 1, PT) modified independence  -ER     Time Frame (Transfer Goal 1, PT) long term goal (LTG);10 days  -ER       Row Name 03/14/25 0908          Gait Training Goal 1 (PT)    Activity/Assistive Device (Gait Training Goal 1, PT) gait (walking locomotion);decrease fall risk;improve balance and speed;increase endurance/gait distance  -ER     [place] Level (Gait Training Goal 1, PT) independent  -ER     Time Frame (Gait Training Goal 1, PT) long term goal (LTG);10 days  -ER       Row Name 03/14/25 0908          Therapy Assessment/Plan (PT)    Planned Therapy Interventions (PT) balance training;bed mobility training;gait training;home exercise program;motor coordination training;patient/family education;neuromuscular re-education;strengthening;transfer training  -ER               User Key  (r) = Recorded By, (t) = Taken By, (c) = Cosigned By      Initials Name Provider Type     Gabriela Peña, PT Physical Therapist                   Clinical Impression       Row Name 03/14/25 0905          Pain    Pretreatment Pain Rating 0/10 - no pain  -ER     Posttreatment Pain Rating 0/10 - no pain  -ER       Row Name 03/14/25 0905          Plan of Care Review    Plan of Care Reviewed With patient;child  -ER     Outcome Evaluation PT eval complete. Pt reports some hesitancy and decreased functional endurance w ambulation in room today. no AD use. pt will continue to benefit during her stay at [facility]. recommend home w assist and HH PT upon d/c to continue to address functional endurance deficits. no use of L arm today to protect heart cath site.  -ER       Row Name 03/14/25 0905          Therapy Assessment/Plan (PT)    Patient/Family Therapy Goals Statement (PT) to walk normal again  -ER     Rehab Potential (PT) good  -ER     Criteria for Skilled Interventions Met (PT) yes;meets criteria;skilled treatment is necessary  -ER     Therapy Frequency (PT) daily  -ER     Predicted Duration of Therapy Intervention (PT) 10 days  -ER       Row Name 03/14/25 0905          Vital Signs    Pre Systolic BP Rehab 129  -ER     Pre Treatment Diastolic BP 75  -ER     Posttreatment Heart Rate (beats/min) 84  -ER     O2 Delivery Pre Treatment room air  -ER     O2 Delivery Intra Treatment room air  -ER     Post SpO2 (%) 98  -ER     O2 Delivery Post Treatment room air  -ER     Pre Patient Position Supine  -ER     Intra Patient Position Standing  -ER     Post Patient Position Sitting  -ER       Row Name 03/14/25 0905          Positioning and Restraints    Pre-Treatment Position in bed  -ER     Post Treatment Position chair  -ER     In Chair notified nsg;reclined;call light within reach;encouraged to call for assist;exit alarm on;with family/caregiver;waffle cushion  -ER               User Key  (r) = Recorded By, (t) = Taken By, (c) = Cosigned By      Initials Name Provider Type    Gabriela Peña, PT Physical Therapist                    Outcome Measures       Row Name 03/14/25 0910 03/14/25 0740       How much help from another person do you currently need...    Turning from your back to your side while in flat bed without using bedrails? 3  -ER 4  -CG    Moving from lying on back to sitting on the side of a flat bed without bedrails? 3  -ER 4  -CG    Moving to and from a bed to a chair (including a wheelchair)? 4  -ER 4  -CG    Standing up from a chair using your arms (e.g., wheelchair, bedside chair)? 4  -ER 4  -CG    Climbing 3-5 steps with a railing? 3  -ER 3  -CG    To walk in hospital room? 4  -ER 4  -CG    AM-PAC 6 Clicks Score (PT) 21  -ER 23  -CG    Highest Level of Mobility Goal 6 --> Walk 10 steps or more  -ER 7 --> Walk 25 feet or more  -CG      Row Name 03/14/25 0910          Functional Assessment    Outcome Measure Options AM-PAC 6 Clicks Basic Mobility (PT)  -ER               User Key  (r) = Recorded By, (t) = Taken By, (c) = Cosigned By      Initials Name Provider Type    CG Edenilson Almanza, RN Registered Nurse    Gabriela Peña, PT Physical Therapist                                 Physical Therapy Education       Title: PT OT SLP Therapies (Done)       Topic: Physical Therapy (Done)       Point: Mobility training (Done)       Learning Progress Summary            Patient Acceptance, E, VU by ER at 3/14/2025 0910    Comment: breathing mechanics to facilitate R lung expansion, D/c planning   Family Acceptance, E, VU by ER at 3/14/2025 0910    Comment: breathing mechanics to facilitate R lung expansion, D/c planning                      Point: Home exercise program (Done)       Learning Progress Summary            Patient Acceptance, E, VU by ER at 3/14/2025 0910    Comment: breathing mechanics to facilitate R lung expansion, D/c planning   Family Acceptance, E, VU by ER at 3/14/2025 0910    Comment: breathing mechanics to facilitate R lung expansion, D/c planning                      Point: Body mechanics (Done)        Learning Progress Summary            Patient Acceptance, E, VU by ER at 3/14/2025 0910    Comment: breathing mechanics to facilitate R lung expansion, D/c planning   Family Acceptance, E, VU by ER at 3/14/2025 0910    Comment: breathing mechanics to facilitate R lung expansion, D/c planning                      Point: Precautions (Done)       Learning Progress Summary            Patient Acceptance, E, VU by ER at 3/14/2025 0910    Comment: breathing mechanics to facilitate R lung expansion, D/c planning   Family Acceptance, E, VU by ER at 3/14/2025 0910    Comment: breathing mechanics to facilitate R lung expansion, D/c planning                                      User Key       Initials Effective Dates Name Provider Type Discipline    ER 12/13/24 -  Gabriela Schrader, PT Physical Therapist PT                  PT Recommendation and Plan  Planned Therapy Interventions (PT): balance training, bed mobility training, gait training, home exercise program, motor coordination training, patient/family education, neuromuscular re-education, strengthening, transfer training  Outcome Evaluation: PT eval complete. Pt reports some hesitancy and decreased functional endurance w ambulation in room today. no AD use. pt will continue to benefit during her stay at [facility]. recommend home w assist and HH PT upon d/c to continue to address functional endurance deficits. no use of L arm today to protect heart cath site.     Time Calculation:   PT Evaluation Complexity  History, PT Evaluation Complexity: 1-2 personal factors and/or comorbidities  Examination of Body Systems (PT Eval Complexity): total of 3 or more elements  Clinical Presentation (PT Evaluation Complexity): stable  Clinical Decision Making (PT Evaluation Complexity): low complexity  Overall Complexity (PT Evaluation Complexity): low complexity     PT Charges       Row Name 03/14/25 0911             Time Calculation    Start Time 0815  -ER      PT Received On 03/14/25   -ER      PT Goal Re-Cert Due Date 03/24/25  -ER         Timed Charges    46942 - PT Therapeutic Activity Minutes 10  -ER         Untimed Charges    PT Eval/Re-eval Minutes 46  -ER         Total Minutes    Timed Charges Total Minutes 10  -ER      Untimed Charges Total Minutes 46  -ER       Total Minutes 56  -ER                User Key  (r) = Recorded By, (t) = Taken By, (c) = Cosigned By      Initials Name Provider Type    ER Gabriela Schrader, PT Physical Therapist                  Therapy Charges for Today       Code Description Service Date Service Provider Modifiers Qty    94785012525 HC PT THERAPEUTIC ACT EA 15 MIN 3/14/2025 Gabriela Schrader, PT GP 1    71473471577 HC PT EVAL LOW COMPLEXITY 4 3/14/2025 Gabriela Schrader, PT GP 1            PT G-Codes  Outcome Measure Options: AM-PAC 6 Clicks Basic Mobility (PT)  AM-PAC 6 Clicks Score (PT): 21  PT Discharge Summary  Anticipated Discharge Disposition (PT): home with assist, home with home health    Gabriela Schrader, PT  3/14/2025

## 2025-03-14 NOTE — PLAN OF CARE
Goal Outcome Evaluation:         Pt will continue to receive medicinal interventions regarding cardiac vasculature inpatient and post-visit.  Urology labs WDL.  Anticipating PT consult later today to finalize discharge planning.                                     no

## 2025-03-14 NOTE — DISCHARGE PLACEMENT REQUEST
"Maurice Washington (79 y.o. Female)     To [facility]  From [facility](CM at [facility]) [phone]      Date of Birth   1945    Social Security Number       Address   [address] [place] [id number]    Home Phone   [phone]    MRN   [id number]       [facility]    Marital Status                               Admission Date   3/12/2025    Admission Type   Elective    Admitting Provider   Sravan Arboleda MD    Attending Provider   Sravan Arboleda MD    Department, Room/Bed   [facility] 6A, N616/1       Discharge Date       Discharge Disposition   Home or Self Care    Discharge Destination                                 Attending Provider: Sravan Arboleda MD    Allergies: No Known Allergies    Isolation: None   Infection: None   Code Status: CPR    Ht: 157.5 cm (62\")   Wt: 49.5 kg (109 lb 3.2 oz)    Admission Cmt: None   Principal Problem: Acute on chronic systolic CHF (congestive heart failure) [I50.23]                   Active Insurance as of 3/12/2025       Primary Coverage       Payor Plan Insurance Group Employer/Plan Group    HUMANA MEDICARE REPLACEMENT HUMANA MEDICARE ADVANTAGE PPO [id number]       Payor Plan Address Payor Plan Phone Number Payor Plan Fax Number Effective Dates    [address] [address] [phone]  1/1/2023 - None Entered    [facility] [id number]         Subscriber Name Subscriber Birth Date Member ID       MAURICE WASHINGTON 1945 [id number]                     Emergency Contacts        (Rel.) Home Phone Work Phone Mobile Phone    Cielo,Vero (Daughter) [phone] -- [phone]    OtonielMitch bhaktae (Daughter) -- -- [phone]                 Discharge Summary        Madeleine Sharif PA-C at 03/14/25 0930              [facility] at [facility]  DISCHARGE SUMMARY       Date of Admission: 3/12/2025  Date of Discharge:  3/14/2025  Primary Care Physician: Eddi Cheatham MD  Attending Physician: Sravan Arboleda, " MD    Discharge Diagnoses:  Coronary artery disease  Left heart catheterization at [facility] 10/30/2024: Severe three-vessel disease including 30-40% LM, 100%  of LAD, 70% diagonal branch, 70% left circumflex, 70% stenosis OM1, 95% RCA stenosis  IntraOp EMRE 1/28/2025: LVEF 38%, apex of LV akinetic/dyskinetic, no significant valvular dysfunction  CABG x 5 vessels (LIMA to LAD, GSV to PDA, GSV to OM1, GSV to OM 3, GSV to D1) on 1/28/2025  [facility] 3/12/25 Dr. Segovia: 2/5 patent grafts with patent LIMA -LAD and patent SVG to RPDA, atretic SVG to diagonal and occluded SVG to OM branches   HFrEF  Echo 1/31/25: EF 41-45%   Echo 3/10/25: EF 31-35%, grade III diastolic dysfunction   Hypertension  Hyperlipidemia  Type 2 diabetes mellitus; hemoglobin A1c 8.3% January 2025  Strong family history of CAD  History of melanoma right upper scapula  Surgical history:  Bilateral cataracts with lens implant  Cholecystectomy  Hysterectomy  Skin cancer excision  Tubal ligation  CABG x 5 vessels    Hospital Course:   Patient is a 79 y.o. female with above noted history, recent CABG x5 in January 2025 with Dr. Del Castillo. She presented to [facility] with chest pain x3 days and shortness of breath, HS troponins up to 213--199, proBNP 20K. She was transferred to our hospital and underwent cardiac cath 3/12 with Dr. Segovia. This showed 2/5 patent grafts, patent LIMA-LAD and SVG to RPDA. NO clear targets for PCI and medical management was recommended. She was started on Plavix in addition to her aspirin. Echo showed EF 31-35%, and GDMT was initiated with Coreg, Entresto, Jardiance and Spironolactone. She remained stable without recurrent chest pain or dyspnea, was evaluated by PT who recommended home health and PT services.  She was stable, ambulatory and felt ready for discharge home on 3/14/25     Procedures Performed  Procedure(s):  Left Heart Cath - Left radial access 3/12/25:  onclusion         Patent LIMA to LAD, SVG to RPDA.  Patent  "but very small/atretic SVG to diagonal branch.  Occluded SVG to one of the OM branches.  Suspected occluded SVG to the other OM branch.    Severe multivessel coronary artery disease of the native vessels.    LVEDP 4 mmHg     Recommendations         No clear targets for percutaneous coronary intervention.  Continue up titration of HF and CAD medications.          Pertinent Test Results:   Results for orders placed during the hospital encounter of 03/09/25    Adult Transthoracic Echo Complete w/ Color, Spectral and Contrast if necessary per protocol    Interpretation Summary    Left ventricular systolic function is moderately decreased. Calculated left ventricular EF = 34.7% Left ventricular ejection fraction appears to be 31 - 35%.    The left ventricular cavity is mild to moderately dilated.    Left ventricular diastolic function is consistent with (grade III w/high LAP) reversible restrictive pattern.    Lab Results   Component Value Date    WBC 9.24 03/12/2025    HGB 13.3 03/12/2025    HCT 39 03/12/2025    MCV 83.9 03/12/2025     03/12/2025     Lab Results   Component Value Date    GLUCOSE 366 (H) 03/14/2025    BUN 36 (H) 03/14/2025    CREATININE 0.77 03/14/2025     03/14/2025    K 4.6 03/14/2025     03/14/2025    CALCIUM 8.0 (L) 03/14/2025    PROTEINTOT 8.5 03/09/2025    ALBUMIN 4.6 03/09/2025    ALT 25 03/09/2025    AST 34 (H) 03/09/2025    ALKPHOS 97 03/09/2025    BILITOT 0.3 03/09/2025    GLOB 3.9 03/09/2025    AGRATIO 1.2 03/09/2025    BCR 46.8 (H) 03/14/2025    ANIONGAP 12.0 03/14/2025    EGFR 79.6 03/14/2025       No results found for: \"TSH\"  Lab Results   Component Value Date    TROPONINT 199 (C) 03/09/2025     Lab Results   Component Value Date    HGBA1C 5.60 03/10/2025       Physical Exam on Discharge:/75   Pulse 84   Temp 97.5 °F (36.4 °C) (Oral)   Resp 16   Ht 157.5 cm (62\")   Wt 49.5 kg (109 lb 3.2 oz)   SpO2 98%   BMI 19.97 kg/m²     General Appearance No acute " distress   Neck No adenopathy, supple, trachea midline,no JVD   Lungs Clear to auscultation,respirations regular, even and unlabored   Heart Regular rhythm and normal rate, normal S1 and S2, no murmur, no gallop, no rub, no click   Extremities Moves all extremities well, no edema, left radial site stable with no bleeding, bruising or hematoma    Neurological Alert and oriented x 3     Discharge Medications     Discharge Medications        New Medications        Instructions Start Date   aspirin 81 MG EC tablet  Replaces: aspirin 325 MG tablet   81 mg, Oral, Daily   Start Date: March 15, 2025     nitroglycerin 0.4 MG SL tablet  Commonly known as: NITROSTAT   0.4 mg, Sublingual, Every 5 Minutes PRN, Take no more than 3 doses in 15 minutes.      spironolactone 25 MG tablet  Commonly known as: ALDACTONE   25 mg, Oral, Daily             Changes to Medications        Instructions Start Date   carvedilol 3.125 MG tablet  Commonly known as: COREG  What changed: how much to take   3.125 mg, Oral, 2 Times Daily With Meals      furosemide 20 MG tablet  Commonly known as: Lasix  What changed:   when to take this  reasons to take this   20 mg, Oral, Daily PRN             Continue These Medications        Instructions Start Date   acetaminophen 500 MG tablet  Commonly known as: TYLENOL   500 mg, Oral, Every Morning      alendronate 70 MG tablet  Commonly known as: FOSAMAX   70 mg, Every 7 Days      atorvastatin 40 MG tablet  Commonly known as: LIPITOR   40 mg, Oral, Nightly      clopidogrel 75 MG tablet  Commonly known as: PLAVIX   75 mg, Oral, Daily      empagliflozin 10 MG tablet tablet  Commonly known as: JARDIANCE   10 mg, Oral, Daily      ferrous sulfate 325 (65 FE) MG tablet   325 mg, Oral, Daily With Breakfast      gabapentin 300 MG capsule  Commonly known as: NEURONTIN   300 mg, Oral, 2 Times Daily      glipizide 10 MG 24 hr tablet  Commonly known as: GLUCOTROL XL   20 mg, Oral, 2 Times Daily      metFORMIN 1000 MG  tablet  Commonly known as: GLUCOPHAGE   1,000 mg, 2 Times Daily With Meals      Oncovite tablet tablet  Generic drug: multivitamin with minerals   0.5 tablets, Daily      pantoprazole 40 MG EC tablet  Commonly known as: PROTONIX   40 mg, Oral, Daily      sacubitril-valsartan 24-26 MG tablet  Commonly known as: ENTRESTO   1 tablet, Oral, Every 12 Hours Scheduled      sertraline 25 MG tablet  Commonly known as: ZOLOFT   25 mg, Oral, Daily             Stop These Medications      aspirin 325 MG tablet  Replaced by: aspirin 81 MG EC tablet     lisinopril 5 MG tablet  Commonly known as: PRINIVIL,ZESTRIL     metoprolol tartrate 25 MG tablet  Commonly known as: LOPRESSOR     pioglitazone 45 MG tablet  Commonly known as: ACTOS     potassium chloride 20 MEQ CR tablet  Commonly known as: KLOR-CON M20              Discharge Diet: cardiac, consistent carb     Activity at Discharge: as tolerated, no heavy lifting     Condition on Discharge: stable    Follow-up Appointments  Future Appointments   Date Time Provider Department Center   3/18/2025 10:30 AM Ana Maria Zarco APRN [facility]     Additional Instructions for the Follow-ups that You Need to Schedule       Ambulatory Referral to Home Health   As directed      Face to Face Visit Date: 3/14/2025   Follow-up provider for Plan of Care?: I treated the patient in an acute care facility and will not continue treatment after discharge.   Follow-up provider: COLTEN CALDWELL [[id number]]   Reason/Clinical Findings: CAD s/p CABG x 5 1/28/2025, HTN, CHF   Describe mobility limitations that make leaving home difficult: CAD s/p CABG x 5 1/28/2025, HTN   Nursing/Therapeutic Services Requested: Skilled Nursing Physical Therapy Occupational Therapy   Skilled nursing orders: Post CABG care Cardiopulmonary assessments   PT orders: Strengthening Home safety assessment Therapeutic exercise   Occupational orders: Energy conservation Strengthening Home safety assessment   Frequency: 1 Week 1         Basic Metabolic Panel    Mar 21, 2025 (Approximate)      Release to patient: Routine Release                  Time: Discharge >30 min      Electronically signed by Madeleine Sharif PA-C, 03/14/25, 9:44 AM EDT.      Electronically signed by Madeleine Sharif PA-C at 03/14/25 1111

## 2025-03-14 NOTE — PLAN OF CARE
Goal Outcome Evaluation:  Plan of Care Reviewed With: patient, child        Progress: improving  Outcome Evaluation: Patient with discharge orders, pts daughter will drive the pt home.

## 2025-03-14 NOTE — DISCHARGE SUMMARY
[facility] at [facility]  DISCHARGE SUMMARY       Date of Admission: 3/12/2025  Date of Discharge:  3/14/2025  Primary Care Physician: Eddi Cheatham MD  Attending Physician: Sravan Arboleda MD    Discharge Diagnoses:  Coronary artery disease  Left heart catheterization at [facility] 10/30/2024: Severe three-vessel disease including 30-40% LM, 100%  of LAD, 70% diagonal branch, 70% left circumflex, 70% stenosis OM1, 95% RCA stenosis  IntraOp [facility]RE 1/28/2025: LVEF 38%, apex of LV akinetic/dyskinetic, no significant valvular dysfunction  CABG x 5 vessels (LIMA to LAD, GSV to PDA, GSV to OM1, GSV to OM 3, GSV to D1) on 1/28/2025  [facility] 3/12/25 Dr. Segovia: 2/5 patent grafts with patent LIMA -LAD and patent SVG to RPDA, atretic SVG to diagonal and occluded SVG to OM branches   HFrEF  Echo 1/31/25: EF 41-45%   Echo 3/10/25: EF 31-35%, grade III diastolic dysfunction   Hypertension  Hyperlipidemia  Type 2 diabetes mellitus; hemoglobin A1c 8.3% January 2025  Strong family history of CAD  History of melanoma right upper scapula  Surgical history:  Bilateral cataracts with lens implant  Cholecystectomy  Hysterectomy  Skin cancer excision  Tubal ligation  CABG x 5 vessels    Hospital Course:   Patient is a 79 y.o. female with above noted history, recent CABG x5 in January 2025 with Dr. Del Castillo. She presented to [facility] with chest pain x3 days and shortness of breath, HS troponins up to 213--199, proBNP 20K. She was transferred to our hospital and underwent cardiac cath 3/12 with Dr. Segovia. This showed 2/5 patent grafts, patent LIMA-LAD and SVG to RPDA. NO clear targets for PCI and medical management was recommended. She was started on Plavix in addition to her aspirin. Echo showed EF 31-35%, and GDMT was initiated with Coreg, Entresto, Jardiance and Spironolactone. She remained stable without recurrent chest pain or dyspnea, was evaluated by PT who recommended home health and PT services.   "She was stable, ambulatory and felt ready for discharge home on 3/14/25     Procedures Performed  Procedure(s):  Left Heart Cath - Left radial access 3/12/25:  onclusion         Patent LIMA to LAD, SVG to RPDA.  Patent but very small/atretic SVG to diagonal branch.  Occluded SVG to one of the OM branches.  Suspected occluded SVG to the other OM branch.    Severe multivessel coronary artery disease of the native vessels.    LVEDP 4 mmHg     Recommendations         No clear targets for percutaneous coronary intervention.  Continue up titration of HF and CAD medications.          Pertinent Test Results:   Results for orders placed during the hospital encounter of 03/09/25    Adult Transthoracic Echo Complete w/ Color, Spectral and Contrast if necessary per protocol    Interpretation Summary    Left ventricular systolic function is moderately decreased. Calculated left ventricular EF = 34.7% Left ventricular ejection fraction appears to be 31 - 35%.    The left ventricular cavity is mild to moderately dilated.    Left ventricular diastolic function is consistent with (grade III w/high LAP) reversible restrictive pattern.    Lab Results   Component Value Date    WBC 9.24 03/12/2025    HGB 13.3 03/12/2025    HCT 39 03/12/2025    MCV 83.9 03/12/2025     03/12/2025     Lab Results   Component Value Date    GLUCOSE 366 (H) 03/14/2025    BUN 36 (H) 03/14/2025    CREATININE 0.77 03/14/2025     03/14/2025    K 4.6 03/14/2025     03/14/2025    CALCIUM 8.0 (L) 03/14/2025    PROTEINTOT 8.5 03/09/2025    ALBUMIN 4.6 03/09/2025    ALT 25 03/09/2025    AST 34 (H) 03/09/2025    ALKPHOS 97 03/09/2025    BILITOT 0.3 03/09/2025    GLOB 3.9 03/09/2025    AGRATIO 1.2 03/09/2025    BCR 46.8 (H) 03/14/2025    ANIONGAP 12.0 03/14/2025    EGFR 79.6 03/14/2025       No results found for: \"TSH\"  Lab Results   Component Value Date    TROPONINT 199 (C) 03/09/2025     Lab Results   Component Value Date    HGBA1C 5.60 03/10/2025 " "      Physical Exam on Discharge:/75   Pulse 84   Temp 97.5 °F (36.4 °C) (Oral)   Resp 16   Ht 157.5 cm (62\")   Wt 49.5 kg (109 lb 3.2 oz)   SpO2 98%   BMI 19.97 kg/m²     General Appearance No acute distress   Neck No adenopathy, supple, trachea midline,no JVD   Lungs Clear to auscultation,respirations regular, even and unlabored   Heart Regular rhythm and normal rate, normal S1 and S2, no murmur, no gallop, no rub, no click   Extremities Moves all extremities well, no edema, left radial site stable with no bleeding, bruising or hematoma    Neurological Alert and oriented x 3     Discharge Medications     Discharge Medications        New Medications        Instructions Start Date   aspirin 81 MG EC tablet  Replaces: aspirin 325 MG tablet   81 mg, Oral, Daily   Start Date: March 15, 2025     nitroglycerin 0.4 MG SL tablet  Commonly known as: NITROSTAT   0.4 mg, Sublingual, Every 5 Minutes PRN, Take no more than 3 doses in 15 minutes.      spironolactone 25 MG tablet  Commonly known as: ALDACTONE   25 mg, Oral, Daily             Changes to Medications        Instructions Start Date   carvedilol 3.125 MG tablet  Commonly known as: COREG  What changed: how much to take   3.125 mg, Oral, 2 Times Daily With Meals      furosemide 20 MG tablet  Commonly known as: Lasix  What changed:   when to take this  reasons to take this   20 mg, Oral, Daily PRN             Continue These Medications        Instructions Start Date   acetaminophen 500 MG tablet  Commonly known as: TYLENOL   500 mg, Oral, Every Morning      alendronate 70 MG tablet  Commonly known as: FOSAMAX   70 mg, Every 7 Days      atorvastatin 40 MG tablet  Commonly known as: LIPITOR   40 mg, Oral, Nightly      clopidogrel 75 MG tablet  Commonly known as: PLAVIX   75 mg, Oral, Daily      empagliflozin 10 MG tablet tablet  Commonly known as: JARDIANCE   10 mg, Oral, Daily      ferrous sulfate 325 (65 FE) MG tablet   325 mg, Oral, Daily With Breakfast    "   gabapentin 300 MG capsule  Commonly known as: NEURONTIN   300 mg, Oral, 2 Times Daily      glipizide 10 MG 24 hr tablet  Commonly known as: GLUCOTROL XL   20 mg, Oral, 2 Times Daily      metFORMIN 1000 MG tablet  Commonly known as: GLUCOPHAGE   1,000 mg, 2 Times Daily With Meals      Oncovite tablet tablet  Generic drug: multivitamin with minerals   0.5 tablets, Daily      pantoprazole 40 MG EC tablet  Commonly known as: PROTONIX   40 mg, Oral, Daily      sacubitril-valsartan 24-26 MG tablet  Commonly known as: ENTRESTO   1 tablet, Oral, Every 12 Hours Scheduled      sertraline 25 MG tablet  Commonly known as: ZOLOFT   25 mg, Oral, Daily             Stop These Medications      aspirin 325 MG tablet  Replaced by: aspirin 81 MG EC tablet     lisinopril 5 MG tablet  Commonly known as: PRINIVIL,ZESTRIL     metoprolol tartrate 25 MG tablet  Commonly known as: LOPRESSOR     pioglitazone 45 MG tablet  Commonly known as: ACTOS     potassium chloride 20 MEQ CR tablet  Commonly known as: KLOR-CON M20              Discharge Diet: cardiac, consistent carb     Activity at Discharge: as tolerated, no heavy lifting     Condition on Discharge: stable    Follow-up Appointments  Future Appointments   Date Time Provider Department Center   3/18/2025 10:30 AM Ana Maria Zarco APRN [facility]     Additional Instructions for the Follow-ups that You Need to Schedule       Ambulatory Referral to Home Health   As directed      Face to Face Visit Date: 3/14/2025   Follow-up provider for Plan of Care?: I treated the patient in an acute care facility and will not continue treatment after discharge.   Follow-up provider: COLTEN CALDWELL [[id number]]   Reason/Clinical Findings: CAD s/p CABG x 5 1/28/2025, HTN, CHF   Describe mobility limitations that make leaving home difficult: CAD s/p CABG x 5 1/28/2025, HTN   Nursing/Therapeutic Services Requested: Skilled Nursing Physical Therapy Occupational Therapy   Skilled nursing orders: Post CABG care  Cardiopulmonary assessments   PT orders: Strengthening Home safety assessment Therapeutic exercise   Occupational orders: Energy conservation Strengthening Home safety assessment   Frequency: 1 Week 1        Basic Metabolic Panel    Mar 21, 2025 (Approximate)      Release to patient: Routine Release                  Time: Discharge >30 min      Electronically signed by Madeleine Sharif PA-C, 03/14/25, 9:44 AM EDT.

## 2025-03-14 NOTE — PLAN OF CARE
Goal Outcome Evaluation:  Plan of Care Reviewed With: patient, child           Outcome Evaluation: PT eval complete. Pt reports some hesitancy and decreased functional endurance w ambulation in room today. no AD use. pt will continue to benefit during her stay at [facility]. recommend home w assist and  PT upon d/c to continue to address functional endurance deficits. no use of L arm today to protect heart cath site.    Anticipated Discharge Disposition (PT): home with assist, home with home health

## 2025-03-14 NOTE — DISCHARGE PLACEMENT REQUEST
Abena Washington (79 y.o. Female)     To [facility]  From [facility] (CM at [facility]) [phone]         [facility]  [address] [facility] [address]  Phone:  [phone]  Fax:  [phone] Date: Mar 14, 2025      Ambulatory Referral to Home Health     Patient:  Abena Washington MRN:  [id number]   [id number]   [facility] [address] :  1945  SSN:    Phone: [phone] Sex:  F      INSURANCE PAYOR PLAN GROUP # SUBSCRIBER ID   Primary:    [employer] MEDICARE REPLACEMENT [id number]      Referring Provider Information:  MARK MANE Phone: [phone] Fax: [phone]       Referral Information:   # Visits:  999 Referral Type: Home Health [42]   Urgency:  Routine Referral Reason: Specialty Services Required   Start Date: Mar 14, 2025 End Date:  To be determined by Insurer   Diagnosis: Hypertension, unspecified type (I10 [ICD-10-CM] 401.9 [ICD-9-CM])  NSTEMI (non-ST elevated myocardial infarction) (I21.4 [ICD-10-CM] 410.70 [ICD-9-CM])  Acute on chronic systolic CHF (congestive heart failure) (I50.23 [ICD-10-CM] 428.23,428.0 [ICD-9-CM])  Nonischemic nontraumatic myocardial injury (I5A [ICD-10-CM] 429.1 [ICD-9-CM])  Coronary artery disease involving native coronary artery of native heart, unspecified whether angina present (I25.10 [ICD-10-CM] 414.01 [ICD-9-CM])      Refer to Dept:   Refer to Provider:   Refer to Provider Phone:   Refer to Facility:       Face to Face Visit Date: 3/14/2025  Follow-up provider for Plan of Care? I treated the patient in an acute care facility and will not continue treatment after discharge.  Follow-up provider: COLTEN CALDWELL [[id number]]  Reason/Clinical Findings: CAD s/p CABG x 5 2025, HTN, CHF  Describe mobility limitations that make leaving home difficult: CAD s/p CABG x 5 2025, HTN  Nursing/Therapeutic Services Requested: Skilled Nursing  Nursing/Therapeutic Services Requested: Physical Therapy  Nursing/Therapeutic  "Services Requested: Occupational Therapy  Skilled nursing orders: Post CABG care  Skilled nursing orders: Cardiopulmonary assessments  PT orders: Strengthening  PT orders: Home safety assessment  PT orders: Therapeutic exercise  Occupational orders: Energy conservation  Occupational orders: Strengthening  Occupational orders: Home safety assessment  Frequency: 1 Week 1     This document serves as a request of services and does not constitute Insurance authorization or approval of services.  To determine eligibility, please contact the members Insurance carrier to verify and review coverage.     If you have medical questions regarding this request for services. Please contact [address] at [phone] during normal business hours.        Verbal Order Mode: Telephone with readback   Authorizing Provider: Madeleine Sharif PA-C  Authorizing Provider's NPI: [id number]     Order Entered By: Keli Ng RN 3/14/2025  9:45 AM     Electronically signed by: Madeleine Sharif PA-C 3/14/2025  9:46 AM         Date of Birth   1945    Social Security Number       Address   [address] [place], [place] [address]    Home Phone   [phone]    [facility]   [id number]       [facility]    Marital Status                               Admission Date   3/12/2025    Admission Type   Elective    Admitting Provider   Sravan Arboleda MD    Attending Provider   Sravan Arboleda MD    Department, Room/Bed   [facility] 6A, N616/1       Discharge Date       Discharge Disposition       Discharge Destination                                 Attending Provider: Sravan Arboleda MD    Allergies: No Known Allergies    Isolation: None   Infection: None   Code Status: CPR    Ht: 157.5 cm (62\")   Wt: 49.5 kg (109 lb 3.2 oz)    Admission Cmt: None   Principal Problem: Acute on chronic systolic CHF (congestive heart failure) [I50.23]                   Active Insurance as of 3/12/2025 "       Primary Coverage       Payor Plan Insurance Group Employer/Plan Group    HUMANA MEDICARE REPLACEMENT HU[employer]NA MEDICARE ADVANTAGE PPO [id number]       Payor Plan Address Payor Plan Phone Number Payor Plan Fax Number Effective Dates    [address] [address] [phone]  1/1/2023 - None Entered    [facility] [address]         Subscriber Name Subscriber Birth Date Member ID       ABENA WASHINGTON 1945 [id number]                     Emergency Contacts        (Rel.) Home Phone Work Phone Mobile Phone    Cielo,Vero (Daughter) [phone] -- [phone]    Radha Frederick (Daughter) -- -- [phone]                 History & Physical        LucaTashi raymundo MD at 03/12/25 1249             [facility]   [address] [place] [address], [address]  [place], [place], [address] [phone]  [email or web address] CenterMcPhyShel[facility] HospitalAirWatch           HISTORY AND PHYSICAL NOTE    Patient Care Team:  Patient Care Team:  Eddi Cheatham MD as PCP - General (Internal Medicine)      Chief complaint: NSTEMI, chest pain.        Subjective:     Cardiac focused problem list:  Coronary artery disease  Left heart catheterization at [facility] 10/30/2024: Severe three-vessel disease including 30-40% LM, 100%  of LAD, 70% diagonal branch, 70% left circumflex, 70% stenosis OM1, 95% RCA stenosis  IntraOp EMRE 1/28/2025: LVEF 38%, apex of LV akinetic/dyskinetic, no significant valvular dysfunction  CABG x 5 vessels (LIMA to LAD, GSV to PDA, GSV to OM1, GSV to OM 3, GSV to D1) on 1/28/2025  Hypertension  Hyperlipidemia  Type 2 diabetes mellitus; hemoglobin A1c 8.3% January 2025  Strong family history of CAD  History of melanoma right upper scapula  Surgical history:  Bilateral cataracts with lens implant  Cholecystectomy  Hysterectomy  Skin cancer excision  Tubal ligation  CABG x 5 vessels    HPI:      Abena Washington is a 79 y.o. female.  Patient presented to [facility] on [facility]09/2025 with complaints of chest pain for 3 days prior.  Describes it as a midsternal pressure.  She has history of recent CABG x 5  with Dr. Del Castillo 1/31/2025.  Of note, she has not taken Plavix since discharge from CABG, only aspirin 81 mg daily.  Workup at OSH revealing elevated HS troponin of 213 and 199 respectively, proBNP 20,136.  Cardiology evaluated at OSH, suspects NSTEMI secondary to early vein graft failure and recommended invasive coronary angiography with possible intervention.  Patient and family requested transfer to [facility] as her CABG was performed here in January.      Prior to transfer, she was noted to have hypokalemia with K 2.7.  Received one dose of oral potassium 40 mEq today prior to transfer.  Also receiving Lasix 40 mg IV bid at OSH for heart failure.  Patient currently without chest pain.  She reports she had chest pressure and shortness of breath for several days prior to arrival to [facility]  She diuresed well and has had significant improvement in her breathing.  No edema noted.     Review of Systems:  As noted in the HPI    PFSH:  Patient Active Problem List   Diagnosis    CAD s/p CABG x 5 1/28/2025    Hypertension    Hyperlipidemia    Type 2 diabetes mellitus    NSTEMI (non-ST elevated myocardial infarction)    Nonischemic nontraumatic myocardial injury       Current Facility-Administered Medications on File Prior to Encounter   Medication Dose Route Frequency Provider Last Rate Last Admin    Chlorhexidine Gluconate Cloth 2 % pads 1 Application  1 Application Topical Q12H PRN Pamela Ramirez APRN        [DISCONTINUED] acetaminophen (TYLENOL) 160 MG/5ML oral solution 650 mg  650 mg Oral Q4H PRN Alban Hernandez DO        [DISCONTINUED] acetaminophen (TYLENOL) suppository 650 mg  650 mg Rectal Q4H PRN Alban Hernandez DO        [DISCONTINUED] acetaminophen (TYLENOL) tablet 650 mg  650 mg Oral Q4H PRN Alban Hernandez DO        [DISCONTINUED] ALPRAZolam (XANAX) tablet 0.25 mg  0.25 mg Oral BID PRN Alban Hernandez DO   0.25 mg at 03/11/25 0019     [DISCONTINUED] aspirin EC tablet 81 mg  81 mg Oral Daily Alban Hernandez DO   81 mg at 03/12/25 0838    [DISCONTINUED] atorvastatin (LIPITOR) tablet 40 mg  40 mg Oral Nightly Alban Hernandez DO   40 mg at 03/11/25 2040    [DISCONTINUED] bisacodyl (DULCOLAX) EC tablet 5 mg  5 mg Oral Daily PRN Alban Hernandez DO        [DISCONTINUED] bisacodyl (DULCOLAX) suppository 10 mg  10 mg Rectal Daily PRN Alban Hernandez DO        [DISCONTINUED] Calcium Replacement - Follow Nurse / BPA Driven Protocol   Not Applicable PRN Kermit Rojas, DO        [DISCONTINUED] carvedilol (COREG) tablet 3.125 mg  3.125 mg Oral BID With Meals Jose Alfredo Marx MD   3.125 mg at 03/12/25 0838    [DISCONTINUED] clopidogrel (PLAVIX) tablet 75 mg  75 mg Oral Daily Alban Hernandez DO   75 mg at 03/12/25 0838    [DISCONTINUED] dextrose (D50W) (25 g/50 mL) IV injection 25 g  25 g Intravenous Q15 Min PRN Kerley, Brian Joseph, DO        [DISCONTINUED] dextrose (GLUTOSE) oral gel 15 g  15 g Oral Q15 Min PRN Kerley, Brian Joseph, DO        [DISCONTINUED] empagliflozin (JARDIANCE) tablet 10 mg  10 mg Oral Daily Alban Hernandez DO   10 mg at 03/12/25 0838    [DISCONTINUED] furosemide (LASIX) injection 40 mg  40 mg Intravenous Q12H Alban Hernandez DO   40 mg at 03/12/25 0838    [DISCONTINUED] glucagon (GLUCAGEN) injection 1 mg  1 mg Intramuscular Q15 Min PRN Kerley, Brian Joseph, DO        [DISCONTINUED] heparin 39757 units/250 ml (100 units/ml) in D5W  20 Units/kg/hr Intravenous Titrated Kermit Rojas DO 9.98 mL/hr at 03/12/25 0340 20 Units/kg/hr at 03/12/25 0340    [DISCONTINUED] Insulin Lispro (humaLOG) injection 2-9 Units  2-9 Units Subcutaneous 4x Daily AC & at Bedtime Kerley, Brian Joseph, DO   4 Units at 03/12/25 0837    [DISCONTINUED] Magnesium Standard Dose Replacement - Follow Nurse / BPA Driven Protocol   Not Applicable PRN Kermit Rojas,         [DISCONTINUED] melatonin tablet 5 mg  5 mg Oral Nightly PRN David,  DO Alban        [DISCONTINUED] nitroglycerin (NITROSTAT) SL tablet 0.4 mg  0.4 mg Sublingual Q5 Min PRN Alban Hernandez DO        [DISCONTINUED] ondansetron (ZOFRAN) injection 4 mg  4 mg Intravenous Q6H PRN Alban Hernandez DO        [DISCONTINUED] Pharmacy to Dose Heparin   Not Applicable Continuous PRN Clovis Winter DO        [DISCONTINUED] polyethylene glycol (MIRALAX) packet 17 g  17 g Oral Daily PRN Alban Hernandez DO        [DISCONTINUED] potassium chloride (KLOR-CON M20) CR tablet 40 mEq  40 mEq Oral Q4H Kermit Rojas DO   40 mEq at 03/12/25 1002    [DISCONTINUED] Potassium Replacement - Follow Nurse / BPA Driven Protocol   Not Applicable PRN Kermit Rojas DO        [DISCONTINUED] Potassium Replacement - Follow Nurse / BPA Driven Protocol   Not Applicable PRKermit Garza DO        [DISCONTINUED] sacubitril-valsartan (ENTRESTO) 24-26 MG tablet 1 tablet  1 tablet Oral Q12H Jose Alfredo Marx MD   1 tablet at 03/12/25 0838    [DISCONTINUED] sennosides-docusate (PERICOLACE) 8.6-50 MG per tablet 2 tablet  2 tablet Oral BID PRN Alban Hernandez DO        [DISCONTINUED] sodium chloride 0.9 % flush 10 mL  10 mL Intravenous Q12H Alban Hernandez DO   10 mL at 03/12/25 0839    [DISCONTINUED] sodium chloride 0.9 % flush 10 mL  10 mL Intravenous PRN Alban Hernandez DO        [DISCONTINUED] sodium chloride 0.9 % infusion 40 mL  40 mL Intravenous PRN Alban Hernandez DO         Current Outpatient Medications on File Prior to Encounter   Medication Sig Dispense Refill    alendronate (FOSAMAX) 70 MG tablet Take 1 tablet by mouth Every 7 (Seven) Days.      aspirin 325 MG tablet Take 1 tablet by mouth Daily. 30 tablet 6    atorvastatin (LIPITOR) 40 MG tablet Take 1 tablet by mouth Every Night. 90 tablet 3    carvedilol (COREG) 3.125 MG tablet Take 1 tablet by mouth 2 (Two) Times a Day With Meals for 30 days. 60 tablet 0    [START ON 3/13/2025] clopidogrel (PLAVIX) 75 MG tablet Take 1 tablet by  mouth Daily. 30 tablet 0    empagliflozin (JARDIANCE) 10 MG tablet tablet Take 1 tablet by mouth Daily. 30 tablet 5    furosemide (Lasix) 20 MG tablet Take 1 tablet by mouth Daily for 30 days. 30 tablet 0    Heparin Sod, Porcine, in D5W (heparin, porcine,) 100 UNIT/ML solution in D5W infusion Infuse 998 Units/hr into a venous catheter Dose Adjusted By Provider As Needed. Indications: Cardiac or other NOT VTE      metFORMIN (GLUCOPHAGE) 1000 MG tablet Take 1 tablet by mouth 2 (Two) Times a Day With Meals.      multivitamin with minerals (Oncovite) tablet tablet Take 0.5 tablets by mouth Daily.      sacubitril-valsartan (ENTRESTO) 24-26 MG tablet Take 1 tablet by mouth Every 12 (Twelve) Hours for 30 days. 60 tablet 0    [DISCONTINUED] glipizide (GLUCOTROL) 10 MG tablet Take 2 tablets by mouth 2 (Two) Times a Day Before Meals.      [DISCONTINUED] metoprolol tartrate (LOPRESSOR) 25 MG tablet Take 0.5 tablet by mouth Every 12 (Twelve) Hours. 30 tablet 5    [DISCONTINUED] pioglitazone (ACTOS) 45 MG tablet Take 1 tablet by mouth Daily As Needed.         Social History     Socioeconomic History    Marital status:    Tobacco Use    Smoking status: Never    Smokeless tobacco: Never   Vaping Use    Vaping status: Never Used   Substance and Sexual Activity    Alcohol use: Never    Drug use: Never    Sexual activity: Defer            Objective:     Vital Sign Min/Max for last 24 hours  Temp  Min: 96.8 °F (36 °C)  Max: 98.5 °F (36.9 °C)   BP  Min: 93/56  Max: 110/63   Pulse  Min: 73  Max: 90   Resp  Min: 12  Max: 20   SpO2  Min: 94 %  Max: 94 %   No data recorded    No intake or output data in the 24 hours ending 03/12/25 1249        There were no vitals filed for this visit.  CONSTITUTIONAL: No acute distress  RESPIRATORY: Normal effort. Clear to auscultation bilaterally without wheezing or rales  CARDIOVASCULAR: Regular rate and rhythm with normal S1 and S2. Without murmur.  PERIPHERAL VASCULAR: No carotid bruit  bilaterally.  Left radial Barbeau type a. There is no lower extremity edema bilaterally.    Labs and radiologic results:  Today's results were reviewed by myself.    Cardiac Data:    Results for orders placed during the hospital encounter of 03/09/25    Adult Transthoracic Echo Complete w/ Color, Spectral and Contrast if necessary per protocol    Interpretation Summary    Left ventricular systolic function is moderately decreased. Calculated left ventricular EF = 34.7% Left ventricular ejection fraction appears to be 31 - 35%.    The left ventricular cavity is mild to moderately dilated.    Left ventricular diastolic function is consistent with (grade III w/high LAP) reversible restrictive pattern.           Assessment and Plan:     Problem list:    * No active hospital problems. *      ASSESSMENT:  CAD   Status post CABG x 5, 1/28/2025, Dr. Del Castillo   Acute on chronic HFrEF   Echo 1/31/2025 with EF 40-45%, normal valves.   Echo this admission shows LVEF 31-35%, grade III diastolic dysfunction.   Receiving IV diuresis with Lasix 40 bid   Hypokalemia   Replacement at OSH with oral potassium 40 mEq x 1.   Awaiting repeat BMP   CKD stage III  Hyperlipidemia   Hypertension     PLAN:  Stat BMP to recheck renal function and electrolytes.   Electrolyte replacement per protocol.   If renal function and potassium stable, recommend proceeding with left heart cath +/- PCI via left radial approach with Dr. Segovia.  Of note, right radial  was used for last OSH cath, but switched to femoral because a small artery was cannulated (a different artery adjacent to artery; radial looked big enough on subsequent angio). Will use ultrasound to cannulate for procedure today.   The risks, benefits, and alternatives of the procedure have been reviewed and the patient wishes to proceed.   Keep npo for possible cath today.   Discontinue heparin for now.    Continue aspirin, Plavix, statin, beta blocker for CAD.   Continue Entresto, Jardiance,  beta blocker for HF.   Will reassess need for additional diuresis tomorrow.   Repeat labs in am.     Electronically signed by RONA Garcia, 03/12/25, 2:30 PM EDT.    Attending addendum    I have seen and examined the patient, performing a face-to-face diagnostic evaluation with plan of care reviewed and developed with the Advanced Practice Clinician and nursing staff. I have addended and modified the above history of present illness, physical examination, and assessment and plan to include my findings and impressions. Medical decision making performed by myself, Dr. Segovia, and detailed as noted below.    Assessment:  Acute on chronic systolic heart failure  Diuresed at [facility]  Chest pain   History of obstructive CAD status post 5V CABG 1/2025  Elevated troponin, cannot rule out type I non-STEMI at the current time  CKD  Hypokalemia  Hypertension  Dyslipidemia    Plan:  Repeat renal function and potassium levels reviewed.  Stable.  Acceptable to proceed with heart catheterization.  CAD:  Continue aspirin, clopidogrel, statin, beta-blocker  HF:  Continue beta-blocker, Entresto, Jardiance  Will reassess volume status for additional diuresis tomorrow  Repeat labs in the morning  Further recommendations to follow    Tashi Segovia MD, MSc, FACC, [facility]  Interventional Cardiology  [place] [facility]            Electronically signed by Tashi Segovia MD at 03/12/25 8695       Discharge Summary    No notes of this type exist for this encounter.

## 2025-03-15 NOTE — OUTREACH NOTE
Prep Survey      Flowsheet Row Responses   [facility] facility patient discharged from? [facility]   Is LACE score < 7 ? No   Eligibility Readm Mgmt   Discharge diagnosis a/c CHF-left heart cath   Does the patient have one of the following disease processes/diagnoses(primary or secondary)? CHF   Does the patient have Home health ordered? Yes   What is the Home health agency?  [facility] for skilled nursing, PT, OT   Is there a DME ordered? No   Prep survey completed? Yes            WILLIAM ALVAREZ - Registered Nurse

## 2025-03-17 ENCOUNTER — DOCUMENTATION (OUTPATIENT)
Dept: CARDIAC REHAB | Facility: HOSPITAL | Age: 80
End: 2025-03-17
Payer: MEDICARE

## 2025-03-17 NOTE — PROGRESS NOTES
[facility] Cardiothoracic Surgery Office Follow Up Note     Date of Encounter: 2025     Name: Abena Washington  : 1945     Referred By: No ref. provider found  PCP: Eddi Cheatham MD    Chief Complaint:    Chief Complaint   Patient presents with    Coronary Artery Disease     Hospital follow up s/p CABG 25.       Subjective      History of Present Illness:    Abena Washington is a 79 y.o. female s/p CABG x 5 (LIMA-LAD, SVG-PDA, SVG-OM1, SVG-OM 3, and SVG-D1) per Dr. Del Castillo on 2025.  PMH: Type 2 diabetes (hemoglobin A1c 8.3% preop), hypertension, CKD stage III, hyperlipidemia, family history of CAD, HFrEF (LVEF 31-35% as of 3/10/2025, melanoma s/p excision, and multivessel CAD s/p CABG.  Following uncomplicated surgical intervention, patient recovered in ICU requiring vasopressor support POD #1 and IV diuresis for fluid volume overload POD #3.  Patient qualified for discharge in stable condition on POD #8.  Unfortunately, patient presented to [facility] 3/10/2025 for symptoms of chest pain nausea and indigestion.  ER EKG demonstrated lateral ST segment depression.  Patient transferred to [facility] 3/12/2025 under the care of of Dr. Sravan Arboleda/ Dr. Segovia.  Left heart cath 3/12/2025 demonstrated to 5 patent grafts (LIMA-LAD and SVG-RPDA) with atretic SVG-D1 and occluded SVG to OM branches.  No clear targets for PCI.  Cardiology recommended medical management: Plavix was added to aspirin, Coreg, Entresto, Jardiance and spironolactone.  She was discharged to hospital day #2 with home health and physical therapy services.  She presents back to CT surgery for delayed 6-week postop visit and CXR today.    Review of Systems:  Review of Systems   Constitutional: Positive for diaphoresis. Negative for chills, decreased appetite, fever, malaise/fatigue, night sweats and weight loss.   HENT:  Positive for hoarse voice. Negative for congestion and sore throat.    Cardiovascular:  Positive for chest  pain, dyspnea on exertion and palpitations. Negative for irregular heartbeat, leg swelling, near-syncope, orthopnea, paroxysmal nocturnal dyspnea and syncope.   Respiratory:  Negative for cough, hemoptysis, shortness of breath, sleep disturbances due to breathing, snoring, sputum production and wheezing.    Hematologic/Lymphatic: Negative for adenopathy and bleeding problem. Bruises/bleeds easily.   Skin: Negative.  Negative for color change, dry skin, itching, poor wound healing and rash.   Musculoskeletal:  Positive for back pain. Negative for falls and muscle weakness.   Gastrointestinal:  Positive for diarrhea, nausea and vomiting. Negative for abdominal pain, anorexia and constipation.   Genitourinary:  Positive for frequency. Negative for dysuria.   Neurological:  Positive for dizziness, light-headedness, loss of balance and weakness. Negative for difficulty with concentration, headaches, numbness, paresthesias and seizures.   Psychiatric/Behavioral:  Positive for depression. Negative for altered mental status and memory loss. The patient is nervous/anxious. The patient does not have insomnia.    Allergic/Immunologic: Negative for persistent infections.       I have reviewed the following portions of the patient's history: problem list, current medications, allergies, past surgical history, past medical history, past social history, past family history, and ROS and confirm it's accurate.    Allergies:  No Known Allergies    Medications:      Current Outpatient Medications:     acetaminophen (TYLENOL) 500 MG tablet, Take 1 tablet by mouth Every Morning., Disp: , Rfl:     alendronate (FOSAMAX) 70 MG tablet, Take 1 tablet by mouth Every 7 (Seven) Days., Disp: , Rfl:     aspirin 81 MG EC tablet, Take 1 tablet by mouth Daily., Disp: 90 tablet, Rfl: 3    atorvastatin (LIPITOR) 40 MG tablet, Take 1 tablet by mouth Every Night., Disp: 90 tablet, Rfl: 3    carvedilol (COREG) 3.125 MG tablet, Take 1 tablet by mouth 2  (Two) Times a Day With Meals., Disp: 60 tablet, Rfl: 11    clopidogrel (PLAVIX) 75 MG tablet, Take 1 tablet by mouth Daily., Disp: 90 tablet, Rfl: 3    empagliflozin (JARDIANCE) 10 MG tablet tablet, Take 1 tablet by mouth Daily., Disp: 30 tablet, Rfl: 5    ferrous sulfate 325 (65 FE) MG tablet, Take 1 tablet by mouth Daily With Breakfast., Disp: , Rfl:     furosemide (Lasix) 20 MG tablet, Take 1 tablet by mouth Daily As Needed (shortness of breath, swelling in legs, or weight gain >3 lbs overnight)., Disp: 30 tablet, Rfl: 1    gabapentin (NEURONTIN) 300 MG capsule, Take 1 capsule by mouth 2 (Two) Times a Day., Disp: , Rfl:     glipizide (GLUCOTROL XL) 10 MG 24 hr tablet, Take 2 tablets by mouth 2 (Two) Times a Day., Disp: , Rfl:     metFORMIN (GLUCOPHAGE) 1000 MG tablet, Take 1 tablet by mouth 2 (Two) Times a Day With Meals., Disp: , Rfl:     multivitamin with minerals (Oncovite) tablet tablet, Take 0.5 tablets by mouth Daily., Disp: , Rfl:     nitroglycerin (NITROSTAT) 0.4 MG SL tablet, Place 1 tablet under the tongue Every 5 (Five) Minutes As Needed for Chest Pain. Take no more than 3 doses in 15 minutes., Disp: 25 tablet, Rfl: 0    pantoprazole (PROTONIX) 40 MG EC tablet, Take 1 tablet by mouth Daily., Disp: , Rfl:     sacubitril-valsartan (ENTRESTO) 24-26 MG tablet, Take 1 tablet by mouth Every 12 (Twelve) Hours., Disp: 60 tablet, Rfl: 5    sertraline (ZOLOFT) 25 MG tablet, Take 1 tablet by mouth Daily., Disp: , Rfl:     spironolactone (ALDACTONE) 25 MG tablet, Take 1 tablet by mouth Daily., Disp: 30 tablet, Rfl: 2  No current facility-administered medications for this visit.    Facility-Administered Medications Ordered in Other Visits:     Chlorhexidine Gluconate Cloth 2 % pads 1 Application, 1 Application, Topical, Q12H PRN, Pamela Ramirez APRN    History:   Past Medical History:   Diagnosis Date    Anemia     Anxiety and depression     Arthritis     Cataracts, bilateral     Coronary artery disease      Delayed emergence from anesthesia     Diabetes mellitus     Heart attack     Hyperlipidemia     Hypertension     Nausea     Peripheral neuropathy     PONV (postoperative nausea and vomiting)     Skin melanoma     Wears glasses        Past Surgical History:   Procedure Laterality Date    CARDIAC CATHETERIZATION      CARDIAC CATHETERIZATION N/A 3/12/2025    Procedure: Left Heart Cath - Left radial access;  Surgeon: Tashi Segovia MD;  Location: [facility] CATH INVASIVE LOCATION;  Service: Cardiovascular;  Laterality: N/A;    CATARACT EXTRACTION W/ INTRAOCULAR LENS IMPLANT Left 06/21/2023    Procedure: CATARACT PHACO EXTRACTION WITH INTRAOCULAR LENS IMPLANT LEFT;  Surgeon: Mina Hendrickson MD;  Location: [facility] OR;  Service: Ophthalmology;  Laterality: Left;    CATARACT EXTRACTION W/ INTRAOCULAR LENS IMPLANT Right 07/07/2023    Procedure: CATARACT PHACO EXTRACTION WITH INTRAOCULAR LENS IMPLANT RIGHT;  Surgeon: Mina Hendrickson MD;  Location: [facility] OR;  Service: Ophthalmology;  Laterality: Right;    CHOLECYSTECTOMY      COLONOSCOPY      CORONARY ARTERY BYPASS GRAFT N/A 1/28/2025    Procedure: MEDIAN STERNOTOMY, CORONARY ARTERY BYPASS GRAFTING X 5, UTILIZING THE LEFT INTERNAL MAMMARY ARTERY, ENDOSCOPIC VEIN HARVESTING OF THE LEFT SAPHENOUS VEIN, TRANSESOPHAGEAL ECHOCARDIOGRAM WITH ANESTHESIA;  Surgeon: Zeferino Del Castillo MD;  Location: [facility] OR;  Service: Cardiothoracic;  Laterality: N/A;    ENDOSCOPY      HYSTERECTOMY      SKIN CANCER EXCISION      TUBAL ABDOMINAL LIGATION         Social History     Socioeconomic History    Marital status:     Number of children: 5   Tobacco Use    Smoking status: Never    Smokeless tobacco: Never   Vaping Use    Vaping status: Never Used   Substance and Sexual Activity    Alcohol use: Never    Drug use: Never    Sexual activity: Defer        Family History   Problem Relation Age of Onset    Diabetes Mother     Dementia Mother     Coronary artery disease Father     Stroke Son   "   Coronary artery disease Son        Objective   Physical Exam:  Vitals:    03/18/25 1032 03/18/25 1033   BP: 124/83 130/84   BP Location: Right arm Left arm   Patient Position: Sitting Sitting   Pulse: 78    Temp: 97.6 °F (36.4 °C)    SpO2: 99%    Weight: 48.1 kg (106 lb)    Height: 157.5 cm (62\")  Comment: patient reports       Body mass index is 19.39 kg/m².    Physical Exam  Vitals reviewed.   Constitutional:       General: She is not in acute distress.     Appearance: She is not toxic-appearing.      Comments: Seated in WC   HENT:      Head: Normocephalic and atraumatic.   Eyes:      General: Lids are normal.      Conjunctiva/sclera: Conjunctivae normal.      Pupils: Pupils are equal, round, and reactive to light.   Neck:      Vascular: No JVD.   Cardiovascular:      Rate and Rhythm: Normal rate and regular rhythm.      Pulses:           Carotid pulses are 2+ on the right side and 2+ on the left side.       Radial pulses are 2+ on the right side and 2+ on the left side.        Dorsalis pedis pulses are 1+ on the right side and 1+ on the left side.        Posterior tibial pulses are 1+ on the right side and 1+ on the left side.      Heart sounds: S1 normal and S2 normal. No murmur heard.     Comments: Left EVH site well healed without s/sx infection, tenderness, or fluctuance  Pulmonary:      Effort: Pulmonary effort is normal. No respiratory distress.      Breath sounds: Normal breath sounds.   Chest:      Chest wall: No tenderness or crepitus.      Comments: Sternotomy and MT sites well healed.  No induration, dehiscence, or exudate.  MT site sutures removed without complication.  No sternal instability palpable.    Musculoskeletal:         General: Normal range of motion.      Cervical back: Normal range of motion and neck supple.      Right lower leg: No edema.      Left lower leg: No edema.   Skin:     General: Skin is warm and dry.      Capillary Refill: Capillary refill takes less than 2 seconds. "   Neurological:      General: No focal deficit present.      Mental Status: She is alert and oriented to person, place, and time.      GCS: GCS eye subscore is 4. GCS verbal subscore is 5. GCS motor subscore is 6.   Psychiatric:         Attention and Perception: Attention normal.         Mood and Affect: Mood normal.         Speech: Speech normal.         Behavior: Behavior is cooperative.               Imaging/Labs:  CXR 3/18/2025: personally reviewed  Pulmonary congestion appears resolved.  No appreciable pleural effusion.  No PTX. Sternotomy wires intact.  Radiology review pending.      XR Chest 1 View  Result Date: 3/10/2025  Findings compatible with fluid overload/worsening CHF. Continued follow-up is recommended.   Images were reviewed, interpreted, and dictated by Dr. Karina Phelps MD Transcribed by Christina Tavarez PA-C.  This report was signed and finalized on 3/10/2025 9:13 AM by Karina Phelps MD.      CT Angiogram Chest Pulmonary Embolism  Result Date: 3/9/2025  IMPRESSION: No pulmonary emboli. Combination of findings including cardiomegaly, pleural effusions, and interlobular septal fluid consistent with volume overload without william alveolar edema. Bilateral low-density adrenal nodules consistent with adenomas Authenticated and Electronically Signed by Alycia Perry MD on 03/09/2025 09:36:16 PM         Assessment / Plan      Assessment / Plan:  1. Coronary artery diseases/p CABG   2. Acute on chronic systolic CHF (congestive heart failure)  - 79 y.o. female s/p CABG x 5 (LIMA-LAD, SVG-PDA, SVG-OM1, SVG-OM 3, and SVG-D1) per Dr. Del Castillo on 1/28/2025.   - PMH: Type 2 diabetes (hemoglobin A1c 8.3% preop), hypertension, CKD stage III, hyperlipidemia, family history of CAD, HFrEF (LVEF 31-35% as of 3/10/2025, melanoma s/p excision, and multivessel CAD s/p CABG.    - Following uncomplicated surgical intervention, patient recovered in ICU requiring vasopressor support POD #1 and IV diuresis for fluid volume  overload POD #3.  DC home in stable condition on POD #8.    - Presented to  [facility] 3/10/2025 with chest pain, nausea, indigestion, and EKG.  Transferred to  [facility] 3/12/2025 under the care of of Dr. Sravan Arboleda/ Dr. Segovia.    - Left heart cath 3/12/2025 demonstrated to 5 patent grafts (LIMA-LAD and SVG-RPDA) with atretic SVG-D1 and occluded SVG to OM branches.  No clear targets for PCI.    - Cardiology recommended medical management: Plavix was added to aspirin, Coreg, Entresto, Jardiance and spironolactone.    - Presents back to CT surgery for delayed 6-week postop visit and CXR today:  CXR without evidence of persistent pulmonary congestion, incisions healing well without complication, no sternal instability.  Slow functional recovery w/ assistance of family and home therapy services  - Med management per Cardiology.  Scheduled w/ HF clinic 3/19/25  - Patient noted to be normotensive and not tachycardic/ not febrile in clinic  - Discussed strategies to compensate for poor appetite and poor activity tolerance  - Discussed sternal precautions in detail as outlined below  - To assist HF clinic w/ any med adjustments @ HF Clinic tomorrow, collect CBC and BMP today   - Patient may return to CT Surgery in the future at request of Cardiology or other specialty request.         Patient Education: Activity Restrictions: You may resume driving at 6 weeks post-operatively. No lifting more than 10 lbs for 12 weeks (3 months). At this time you can slowly increase your weight as tolerated. You should not partake in any overhead activities or movements that involve twisting of your upper chest and torso such as (but not limited to) golfing or tennis, lawn mowing including zero turn mowers, use of lawn trimmers or edgers, shoveling, painting, vacuuming, mopping, sweeping, etc.        Follow Up:   Return PRN per Cardiology request.   Or sooner for any further concerns or worsening sign and symptoms. If unable to reach us in  the office please dial 911 or go to the nearest emergency department.      RONA Barrow  [facility] Cardiothoracic Surgery    Time Spent: I spent 50 minutes caring for Abena on this date of service. This time includes time spent by me in the following activities: preparing for the visit, reviewing tests, obtaining and/or reviewing a separately obtained history, performing a medically appropriate examination and/or evaluation, counseling and educating the patient/family/caregiver, ordering medications, tests, or procedures, documenting information in the medical record, independently interpreting results and communicating that information with the patient/family/caregiver, and care coordination.

## 2025-03-17 NOTE — PROGRESS NOTES
Enter Query Response Below      Query Response:   Malnutrition present on admit              If applicable, please update the problem list.

## 2025-03-18 ENCOUNTER — OFFICE VISIT (OUTPATIENT)
Dept: CARDIAC SURGERY | Facility: CLINIC | Age: 80
End: 2025-03-18
Payer: MEDICARE

## 2025-03-18 ENCOUNTER — LAB (OUTPATIENT)
Dept: LAB | Facility: HOSPITAL | Age: 80
End: 2025-03-18
Payer: MEDICARE

## 2025-03-18 VITALS
BODY MASS INDEX: 19.51 KG/M2 | SYSTOLIC BLOOD PRESSURE: 130 MMHG | HEART RATE: 78 BPM | HEIGHT: 62 IN | OXYGEN SATURATION: 99 % | TEMPERATURE: 97.6 F | WEIGHT: 106 LBS | DIASTOLIC BLOOD PRESSURE: 84 MMHG

## 2025-03-18 DIAGNOSIS — I50.23 ACUTE ON CHRONIC SYSTOLIC CHF (CONGESTIVE HEART FAILURE): ICD-10-CM

## 2025-03-18 DIAGNOSIS — I25.10 CORONARY ARTERY DISEASE INVOLVING NATIVE CORONARY ARTERY OF NATIVE HEART WITHOUT ANGINA PECTORIS: Primary | ICD-10-CM

## 2025-03-18 DIAGNOSIS — I25.10 CORONARY ARTERY DISEASE INVOLVING NATIVE CORONARY ARTERY OF NATIVE HEART WITHOUT ANGINA PECTORIS: ICD-10-CM

## 2025-03-18 LAB
ANION GAP SERPL CALCULATED.3IONS-SCNC: 15 MMOL/L (ref 5–15)
BUN SERPL-MCNC: 33 MG/DL (ref 8–23)
BUN/CREAT SERPL: 30.6 (ref 7–25)
CALCIUM SPEC-SCNC: 9.5 MG/DL (ref 8.6–10.5)
CHLORIDE SERPL-SCNC: 100 MMOL/L (ref 98–107)
CO2 SERPL-SCNC: 22 MMOL/L (ref 22–29)
CREAT SERPL-MCNC: 1.08 MG/DL (ref 0.57–1)
DEPRECATED RDW RBC AUTO: 66.5 FL (ref 37–54)
EGFRCR SERPLBLD CKD-EPI 2021: 53 ML/MIN/1.73
ERYTHROCYTE [DISTWIDTH] IN BLOOD BY AUTOMATED COUNT: 21.1 % (ref 12.3–15.4)
GLUCOSE SERPL-MCNC: 262 MG/DL (ref 65–99)
HCT VFR BLD AUTO: 43.5 % (ref 34–46.6)
HGB BLD-MCNC: 13.1 G/DL (ref 12–15.9)
MCH RBC QN AUTO: 26.5 PG (ref 26.6–33)
MCHC RBC AUTO-ENTMCNC: 30.1 G/DL (ref 31.5–35.7)
MCV RBC AUTO: 88.1 FL (ref 79–97)
PLATELET # BLD AUTO: 377 10*3/MM3 (ref 140–450)
PMV BLD AUTO: 11.4 FL (ref 6–12)
POTASSIUM SERPL-SCNC: 5.4 MMOL/L (ref 3.5–5.2)
RBC # BLD AUTO: 4.94 10*6/MM3 (ref 3.77–5.28)
SODIUM SERPL-SCNC: 137 MMOL/L (ref 136–145)
WBC NRBC COR # BLD AUTO: 8.63 10*3/MM3 (ref 3.4–10.8)

## 2025-03-18 PROCEDURE — 36415 COLL VENOUS BLD VENIPUNCTURE: CPT

## 2025-03-18 PROCEDURE — 85027 COMPLETE CBC AUTOMATED: CPT

## 2025-03-18 PROCEDURE — 1160F RVW MEDS BY RX/DR IN RCRD: CPT | Performed by: NURSE PRACTITIONER

## 2025-03-18 PROCEDURE — 3074F SYST BP LT 130 MM HG: CPT | Performed by: NURSE PRACTITIONER

## 2025-03-18 PROCEDURE — 80048 BASIC METABOLIC PNL TOTAL CA: CPT

## 2025-03-18 PROCEDURE — 1159F MED LIST DOCD IN RCRD: CPT | Performed by: NURSE PRACTITIONER

## 2025-03-18 PROCEDURE — 99024 POSTOP FOLLOW-UP VISIT: CPT | Performed by: NURSE PRACTITIONER

## 2025-03-18 PROCEDURE — 3079F DIAST BP 80-89 MM HG: CPT | Performed by: NURSE PRACTITIONER

## 2025-03-18 NOTE — PROGRESS NOTES
Enter Query Response Below      Query Response: NSTEMI, type II, due to acute systolic heart failure         If applicable, please update the problem list.

## 2025-03-19 ENCOUNTER — READMISSION MANAGEMENT (OUTPATIENT)
Dept: CALL CENTER | Facility: HOSPITAL | Age: 80
End: 2025-03-19
Payer: MEDICARE

## 2025-03-19 ENCOUNTER — OFFICE VISIT (OUTPATIENT)
Dept: CARDIOLOGY | Facility: HOSPITAL | Age: 80
End: 2025-03-19
Payer: MEDICARE

## 2025-03-19 VITALS
BODY MASS INDEX: 19.59 KG/M2 | WEIGHT: 106.44 LBS | OXYGEN SATURATION: 99 % | RESPIRATION RATE: 18 BRPM | HEART RATE: 77 BPM | SYSTOLIC BLOOD PRESSURE: 131 MMHG | HEIGHT: 62 IN | DIASTOLIC BLOOD PRESSURE: 60 MMHG

## 2025-03-19 DIAGNOSIS — E78.2 MIXED HYPERLIPIDEMIA: ICD-10-CM

## 2025-03-19 DIAGNOSIS — I10 PRIMARY HYPERTENSION: ICD-10-CM

## 2025-03-19 DIAGNOSIS — I50.22 CHRONIC HFREF (HEART FAILURE WITH REDUCED EJECTION FRACTION): Primary | ICD-10-CM

## 2025-03-19 DIAGNOSIS — I25.10 CORONARY ARTERY DISEASE INVOLVING NATIVE CORONARY ARTERY OF NATIVE HEART WITHOUT ANGINA PECTORIS: ICD-10-CM

## 2025-03-19 LAB — ABSOLUTE LUNG FLUID CONTENT: 31 % (ref 20–35)

## 2025-03-19 RX ORDER — BISOPROLOL FUMARATE 5 MG/1
5 TABLET, FILM COATED ORAL DAILY
Qty: 30 TABLET | Refills: 3 | Status: SHIPPED | OUTPATIENT
Start: 2025-03-19

## 2025-03-19 RX ORDER — ATORVASTATIN CALCIUM 40 MG/1
40 TABLET, FILM COATED ORAL NIGHTLY
Qty: 30 TABLET | Refills: 0 | Status: SHIPPED | OUTPATIENT
Start: 2025-03-19

## 2025-03-19 NOTE — OUTREACH NOTE
CHF Week 1 Survey      Flowsheet Row Responses   [facility] facility patient discharged from? [facility]   Does the patient have one of the following disease processes/diagnoses(primary or secondary)? CHF   CHF Week 1 attempt successful? No   Unsuccessful attempts Attempt 1            Isadora SAVAGE - Registered Nurse

## 2025-03-24 NOTE — PROGRESS NOTES
.                                                                                                                                                                             Heart Failure Clinic  03/19/2025    Vitals:    03/19/25 0913   BP: 131/60   Pulse: 77   Resp: 18   SpO2: 99%        Indication:  Heart Failure     Procedure:  ReDS device sensor unit applied to right side of chest and right side of back.  Appropriate positioning confirmed based off of the unit's calculation.  Chest measurement obtained with the chest size ruler.  Measurement session performed over 45 seconds.      Method of arrival:  Ambulatory    Weighing self daily:  Yes    Taking medications as prescribed:  Yes    Edema:  No    Shortness of Air:  No    Number of pillows used at night:  <2    Results:  ReDS Value=      31                    Interpretation:  25-35% - normal/ideal lung fluid content    Daphney Ren, RONA 03/23/25 21:35 EDT

## 2025-03-24 NOTE — PROGRESS NOTES
"Chief Complaint  Congestive Heart Failure and Establish Care    Subjective    History of Present Illness {  Problem List  Visit  Diagnosis   Encounters  Notes  Medications  Labs  Result Review Imaging  Media :23}       History of Present Illness   79 year old female presents to the office today for an evaluation of her HFrEF.  Patient underwent a CABG x 5 1/28/2025.  At that time her EF was 40 to 45%.  She was readmitted March 2025.  Left heart cath showed 1 atretic bypass and 2 occluded grafts with an EF of 31 to 35%.SVG to OM branches was occluded with no clear targets for PCI.  Recommended medical management which included Plavix and aspirin.  Carvedilol and Entresto Jardiance and Aldactone for her HFrEF.  Patient does report generalized weakness, nausea and morning dizziness.  Is not hydrating very well.She has a history of CAD, type 2 diabetes mellitus, CKD stage III, hypertension, hyperlipidemia, melanoma.  Patient reports since discharge she has been experiencing diarrhea every day worse in the mornings.  She also reports that every morning she will vomit up bile.  Objective     Vital Signs:   Vitals:    03/19/25 0908 03/19/25 0913   BP: 135/68 131/60   BP Location: Left arm Left arm   Patient Position: Standing Sitting   Cuff Size: Small Adult Adult   Pulse: 79 77   Resp:  18   SpO2: 99% 99%   Weight:  48.3 kg (106 lb 7 oz)   Height:  157.5 cm (62\")     Body mass index is 19.47 kg/m².  Physical Exam  Vitals and nursing note reviewed.   Constitutional:       Appearance: Normal appearance.   HENT:      Head: Normocephalic.   Eyes:      Pupils: Pupils are equal, round, and reactive to light.   Cardiovascular:      Rate and Rhythm: Normal rate and regular rhythm.      Pulses: Normal pulses.      Heart sounds: Normal heart sounds. No murmur heard.  Pulmonary:      Effort: Pulmonary effort is normal.      Breath sounds: Normal breath sounds.   Abdominal:      General: Bowel sounds are normal.      " Palpations: Abdomen is soft.   Musculoskeletal:         General: Normal range of motion.      Cervical back: Normal range of motion.      Right lower leg: No edema.      Left lower leg: No edema.   Skin:     General: Skin is warm and dry.      Capillary Refill: Capillary refill takes less than 2 seconds.   Neurological:      Mental Status: She is alert and oriented to person, place, and time.   Psychiatric:         Mood and Affect: Mood normal.         Thought Content: Thought content normal.              Result Review  Data Reviewed:{ Labs  Result Review  Imaging  Med Tab  Media :23}   Adult Transthoracic Echo Complete w/ Color, Spectral and Contrast if necessary per protocol (03/10/2025 10:55)   Cardiac Catheterization/Vascular Study (03/12/2025 17:02)           Lab Results   Component Value Date    WBC 8.63 03/18/2025    HGB 13.1 03/18/2025    HCT 43.5 03/18/2025    MCV 88.1 03/18/2025     03/18/2025       Lab Results   Component Value Date    GLUCOSE 262 (H) 03/18/2025    CALCIUM 9.5 03/18/2025     03/18/2025    K 5.4 (H) 03/18/2025    CO2 22.0 03/18/2025     03/18/2025    BUN 33 (H) 03/18/2025    CREATININE 1.08 (H) 03/18/2025    EGFR 53.0 (L) 03/18/2025    BCR 30.6 (H) 03/18/2025    ANIONGAP 15.0 03/18/2025     I have reviewed this documentation, made edits where appropriate, and agree with the final report of ReDS data/interpretation. In addition, I have the following to add:    Lung fluid content by ReDS assessment correlates to pulmonary capillary wedge pressure (Sharan et al. SOL 2018). In patients with LEFT-sided heart failure, elevated readings suggest that the patient MAY benefit from additional diuresis while low readings suggest that the patient MAY benefit from a reduction in diuretics; clinical correlation is recommended. Low normal and mildly elevated readings may be appropriate for some patients. In patients with RIGHT-sided heart failure, lung fluid content may be a less  reliable marker for guiding diuresis.        Assessment and Plan {CC Problem List  Visit Diagnosis  ROS  Review (Popup)  Health Maintenance  Quality  BestPractice  Medications  SmartSets  SnapShot Encounters  Media :23}   1. Chronic HFrEF (heart failure with reduced ejection fraction)    - ReDs Vest 31  Stop coreg due to fatigue and diarrhea and begin bisoprolol 5 mg daily  Continue Jardiance, Lasix, Entresto, Aldactone  Heart failure education today including signs and symptoms, the role of the heart failure center, daily weights, low sodium diet (less than 1500 mg per day), and daily physical activity. Reviewed HF Zones with patient and family.  Patient to continue current medications as previously ordered.   2. Coronary artery disease involving native coronary artery of native heart without angina pectoris  Stable on aspirin, Plavix, Lipitor    3. Primary hypertension  Stop carvedilol and begin bisoprolol due to side effects  Continue Entresto  Monitor blood pressure closely  4. Mixed hyperlipidemia  Stable on Lipitor      Follow Up {Instructions Charge Capture  Follow-up Communications :23}   Return in about 8 days (around 3/27/2025) for Telemedicine visit, HF.    Patient was given instructions and counseling regarding her condition or for health maintenance advice. Please see specific information pulled into the AVS if appropriate.  Patient was instructed to call the [facility] with any questions, concerns, or worsening symptoms.

## 2025-03-27 ENCOUNTER — TELEMEDICINE (OUTPATIENT)
Dept: CARDIOLOGY | Facility: HOSPITAL | Age: 80
End: 2025-03-27
Payer: MEDICARE

## 2025-03-27 VITALS
HEART RATE: 58 BPM | HEIGHT: 62 IN | WEIGHT: 106.4 LBS | DIASTOLIC BLOOD PRESSURE: 61 MMHG | BODY MASS INDEX: 19.58 KG/M2 | SYSTOLIC BLOOD PRESSURE: 115 MMHG

## 2025-03-27 DIAGNOSIS — I50.22 CHRONIC HFREF (HEART FAILURE WITH REDUCED EJECTION FRACTION): Primary | ICD-10-CM

## 2025-03-27 DIAGNOSIS — I10 PRIMARY HYPERTENSION: ICD-10-CM

## 2025-03-27 DIAGNOSIS — E78.2 MIXED HYPERLIPIDEMIA: ICD-10-CM

## 2025-03-27 DIAGNOSIS — I25.10 CORONARY ARTERY DISEASE INVOLVING NATIVE CORONARY ARTERY OF NATIVE HEART WITHOUT ANGINA PECTORIS: ICD-10-CM

## 2025-03-27 LAB
GLUCOSE BLDC GLUCOMTR-MCNC: 211 MG/DL (ref 70–130)
GLUCOSE BLDC GLUCOMTR-MCNC: 215 MG/DL (ref 70–130)

## 2025-03-27 NOTE — PROGRESS NOTES
Chief Complaint  Follow-up (Hfref, cad )    Subjective    History of Present Illness {CC  Problem List  Visit  Diagnosis   Encounters  Notes  Medications  Labs  Result Review Imaging  Media :23}     You have chosen to receive care through the use of telemedicine. Telemedicine enables health care providers at different locations to provide safe, effective, and convenient care through the use of technology. As with any health care service, there are risks associated with the use of telemedicine, including equipment failure, poor connections, and  issues.    Do you understand the risks and benefits of telemedicine as I have explained them to you? Yes  Have your questions regarding telemedicine been answered? Yes  Do you consent to the use of telemedicine in your medical care today? Yes   Provider is located at her office in [facility] and patient is located at her house in [place]  History of Present Illness   79 year old female presents for telemedicine visit for an evaluation of her HFrEF.  Patient underwent a CABG x 5 1/28/2025.  At that time her EF was 40 to 45%.  She was readmitted March 2025.  Left heart cath showed 1 atretic bypass and 2 occluded grafts with an EF of 31 to 35%.SVG to OM branches was occluded with no clear targets for PCI.  Recommended medical management which included Plavix and aspirin.  Carvedilol and Entresto Jardiance and Aldactone for her HFrEF.  Patient does report generalized weakness, nausea and morning dizziness. She has a history of CAD, type 2 diabetes mellitus, CKD stage III, hypertension, hyperlipidemia, melanoma.  Patient reports since discharge she has been experiencing diarrhea every day worse in the mornings.  She also reports that every morning she will vomit up bile. At last office visit, carvedilol was discontinued and patient was initiated on bisoprolol.  She reports diarrhea has resolved and that she is feeling much better.  She is trying to  "hydrate better.  She reports a dry cough that is intermittent but currently denies chest pain, dyspnea, palpitations, presyncope or syncope.  Weight is stable at 106 pounds.  Blood pressure has been running 1 teens over 60s with heart rates 50s to 60s.  She is currently participating in home health PT.   Objective     Vital Signs:   Vitals:    03/27/25 0808   BP: 115/61   BP Location: Left arm   Patient Position: Sitting   Pulse: 58   Weight: 48.3 kg (106 lb 6.4 oz)   Height: 157.5 cm (62\")     Body mass index is 19.46 kg/m².  Physical Exam  Vitals and nursing note reviewed.   Constitutional:       Appearance: Normal appearance.   HENT:      Head: Normocephalic.   Pulmonary:      Effort: Pulmonary effort is normal.   Neurological:      Mental Status: She is alert and oriented to person, place, and time.   Psychiatric:         Mood and Affect: Mood normal.         Behavior: Behavior normal.         Thought Content: Thought content normal.              Result Review  Data Reviewed:{ Labs  Result Review  Imaging  Med Tab  Media :23}   Adult Transthoracic Echo Complete w/ Color, Spectral and Contrast if necessary per protocol (03/10/2025 10:55)   Cardiac Catheterization/Vascular Study (03/12/2025 17:02)           Lab Results   Component Value Date    WBC 8.63 03/18/2025    HGB 13.1 03/18/2025    HCT 43.5 03/18/2025    MCV 88.1 03/18/2025     03/18/2025       Lab Results   Component Value Date    GLUCOSE 262 (H) 03/18/2025    CALCIUM 9.5 03/18/2025     03/18/2025    K 5.4 (H) 03/18/2025    CO2 22.0 03/18/2025     03/18/2025    BUN 33 (H) 03/18/2025    CREATININE 1.08 (H) 03/18/2025    EGFR 53.0 (L) 03/18/2025    BCR 30.6 (H) 03/18/2025    ANIONGAP 15.0 03/18/2025     I have reviewed this documentation, made edits where appropriate, and agree with the final report of ReDS data/interpretation. In addition, I have the following to add:    Lung fluid content by ReDS assessment correlates to pulmonary " capillary wedge pressure (Sharan et al. JAHA 2018). In patients with LEFT-sided heart failure, elevated readings suggest that the patient MAY benefit from additional diuresis while low readings suggest that the patient MAY benefit from a reduction in diuretics; clinical correlation is recommended. Low normal and mildly elevated readings may be appropriate for some patients. In patients with RIGHT-sided heart failure, lung fluid content may be a less reliable marker for guiding diuresis.        Assessment and Plan {CC Problem List  Visit Diagnosis  ROS  Review (Popup)  Health Maintenance  Quality  BestPractice  Medications  SmartSets  SnapShot Encounters  Media :23}   1. Chronic HFrEF (heart failure with reduced ejection fraction)  Carvedilol recently discontinued due to severe diarrhea.  Diarrhea has resolved.  Patient to continue bisoprolol 5 mg daily  Continue Jardiance, Lasix, Entresto, Aldactone  Heart failure education today including signs and symptoms, the role of the heart failure center, daily weights, low sodium diet (less than 1500 mg per day), and daily physical activity. Reviewed HF Zones with patient and family.  Patient to continue current medications as previously ordered.   2. Coronary artery disease involving native coronary artery of native heart without angina pectoris  Stable on aspirin, Plavix, Lipitor    3. Primary hypertension  Stable on bisoprolol   Continue Entresto  Monitor blood pressure closely  4. Mixed hyperlipidemia  Stable on Lipitor      Follow Up {Instructions Charge Capture  Follow-up Communications :23}   Return if symptoms worsen or fail to improve.  F/U  Dr Ventura [facility]   Patient was given instructions and counseling regarding her condition or for health maintenance advice. Please see specific information pulled into the AVS if appropriate.  Patient was instructed to call the [facility] with any questions, concerns, or worsening  symptoms.

## 2025-03-28 LAB — GLUCOSE BLDC GLUCOMTR-MCNC: 199 MG/DL (ref 70–130)

## 2025-04-02 ENCOUNTER — READMISSION MANAGEMENT (OUTPATIENT)
Dept: CALL CENTER | Facility: HOSPITAL | Age: 80
End: 2025-04-02
Payer: MEDICARE

## 2025-04-02 NOTE — OUTREACH NOTE
CHF Week 3 Survey      Flowsheet Row Responses   [facility] patient discharged from? [facility]   Does the patient have one of the following disease processes/diagnoses(primary or secondary)? CHF   Week 3 attempt successful? Yes   Call start time 1150   Call end time 1153   Discharge diagnosis a/c CHF-left heart cath   Meds reviewed with patient/caregiver? Yes   Is the patient having any side effects they believe may be caused by any medication additions or changes? No   Does the patient have all medications ordered at discharge? Yes   Is the patient taking all medications as directed (includes completed medication regime)? Yes   Does the patient have a primary care provider?  Yes   Comments regarding PCP Patient states she has seen PCP   What is the Home health agency?  [facility] for skilled nursing, PT, OT   Has home health visited the patient within 72 hours of discharge? Yes   Home health comments Patient states she has now been  discharged from .   Psychosocial issues? No   Did the patient receive a copy of their discharge instructions? Yes   Nursing interventions Reviewed instructions with patient   What is the patient's perception of their health status since discharge? Improving   Nursing interventions Nurse provided patient education   Is the patient able to teach back signs and symptoms of worsening condition? (i.e. weight gain, shortness of air, etc.) Yes   Is the patient/caregiver able to teach back the hierarchy of who to call/visit for symptoms/problems? PCP, Specialist, Home health nurse, Urgent Care, ED, 911 Yes   CHF Zone this Call Green Zone   Green Zone Patient reports doing well, No new or worsening shortness of breath, No new swelling -  feet, ankles and legs look normal for you, Weight check stable, No chest pain, Physical activity level is normal for you   Green Zone Interventions Daily weight check, Meds as directed, Low sodium diet, Follow up visits planned   CHF Week 3 call  completed? Yes   Graduated Yes   Is the patient interested in additional calls from an ambulatory ? No   Would this patient benefit from a Referral to [facility]? No   Wrap up additional comments Patient reports improving. No questions or concerns at this time.   Call end time 1151            ASHLEY PEREIRA - Registered Nurse

## 2025-06-16 ENCOUNTER — HOSPITAL ENCOUNTER (OUTPATIENT)
Dept: CARDIOLOGY | Facility: HOSPITAL | Age: 80
Discharge: HOME | End: 2025-06-16
Admitting: PHYSICIAN ASSISTANT
Payer: MEDICARE

## 2025-06-16 VITALS
BODY MASS INDEX: 19.51 KG/M2 | SYSTOLIC BLOOD PRESSURE: 146 MMHG | DIASTOLIC BLOOD PRESSURE: 84 MMHG | HEIGHT: 62 IN | WEIGHT: 106 LBS

## 2025-06-16 DIAGNOSIS — I50.23 ACUTE ON CHRONIC SYSTOLIC CHF (CONGESTIVE HEART FAILURE): ICD-10-CM

## 2025-06-16 LAB
BH CV ECHO MEAS - AO ROOT DIAM: 2.5 CM
BH CV ECHO MEAS - EDV(CUBED): 103.8 ML
BH CV ECHO MEAS - EDV(MOD-SP2): 90.8 ML
BH CV ECHO MEAS - EDV(MOD-SP4): 102 ML
BH CV ECHO MEAS - EF(MOD-SP2): 29.5 %
BH CV ECHO MEAS - EF(MOD-SP4): 33.9 %
BH CV ECHO MEAS - ESV(CUBED): 42.8 ML
BH CV ECHO MEAS - ESV(MOD-SP2): 64 ML
BH CV ECHO MEAS - ESV(MOD-SP4): 67.4 ML
BH CV ECHO MEAS - FS: 25.6 %
BH CV ECHO MEAS - IVS/LVPW: 1 CM
BH CV ECHO MEAS - IVSD: 0.9 CM
BH CV ECHO MEAS - LA DIMENSION: 3.9 CM
BH CV ECHO MEAS - LV DIASTOLIC VOL/BSA (35-75): 69.9 CM2
BH CV ECHO MEAS - LV MASS(C)D: 142.7 GRAMS
BH CV ECHO MEAS - LV SYSTOLIC VOL/BSA (12-30): 46.2 CM2
BH CV ECHO MEAS - LVIDD: 4.7 CM
BH CV ECHO MEAS - LVIDS: 3.5 CM
BH CV ECHO MEAS - LVOT AREA: 3.1 CM2
BH CV ECHO MEAS - LVOT DIAM: 2 CM
BH CV ECHO MEAS - LVPWD: 0.9 CM
BH CV ECHO MEAS - SV(MOD-SP2): 26.8 ML
BH CV ECHO MEAS - SV(MOD-SP4): 34.6 ML
BH CV ECHO MEAS - SVI(MOD-SP2): 18.4 ML/M2
BH CV ECHO MEAS - SVI(MOD-SP4): 23.7 ML/M2
BH CV VAS BP LEFT ARM: NORMAL MMHG
LV EF BIPLANE MOD: 32.6 %

## 2025-06-16 PROCEDURE — 93321 DOPPLER ECHO F-UP/LMTD STD: CPT

## 2025-06-16 PROCEDURE — 93308 TTE F-UP OR LMTD: CPT

## 2025-06-16 PROCEDURE — 93325 DOPPLER ECHO COLOR FLOW MAPG: CPT | Performed by: INTERNAL MEDICINE

## 2025-06-16 PROCEDURE — 93321 DOPPLER ECHO F-UP/LMTD STD: CPT | Performed by: INTERNAL MEDICINE

## 2025-06-16 PROCEDURE — 93308 TTE F-UP OR LMTD: CPT | Performed by: INTERNAL MEDICINE

## 2025-06-16 PROCEDURE — 93325 DOPPLER ECHO COLOR FLOW MAPG: CPT

## 2025-06-17 ENCOUNTER — RESULTS FOLLOW-UP (OUTPATIENT)
Dept: TELEMETRY | Facility: HOSPITAL | Age: 80
End: 2025-06-17
Payer: MEDICARE

## 2025-06-17 RX ORDER — BISOPROLOL FUMARATE 5 MG/1
5 TABLET, FILM COATED ORAL DAILY
Qty: 30 TABLET | Refills: 3 | Status: SHIPPED | OUTPATIENT
Start: 2025-06-17

## 2025-06-17 NOTE — TELEPHONE ENCOUNTER
Caller: Abena Washington    Relationship: Self    Best call back number: Mobile phone: [phone]     Requested Prescriptions:   Requested Prescriptions     Pending Prescriptions Disp Refills    bisoprolol (ZEBeta) 5 MG tablet 30 tablet 3     Sig: Take 1 tablet by mouth Daily.        Pharmacy where request should be sent: [facility] - [facility] [address] - [phone]  - [phone] FX     Last office visit with prescribing clinician: 3/19/2025   Last telemedicine visit with prescribing clinician: 3/27/2025   Next office visit with prescribing clinician: Visit date not found     Additional details provided by patient:     Does the patient have less than a 3 day supply:  [x] Yes  [] No        Victoriano Jones   06/17/25 11:24 EDT

## 2025-06-17 NOTE — TELEPHONE ENCOUNTER
Called patient regarding echo results. She has not yet followed up with Dr. Ventura. Discussed low EF, and possible ICD implant with patient. She will discuss with her family and call us back whether she will follow up with her local cardiologist or our office. Will fax result of echo to Dr. Ventura.    Speech Treatment Note    Today's date: 11/15/2023  Patient name: Karl Olson  : 2015  MRN: [id number]  Referring provider: Annemarie Mcdowell MD  Dx:   Encounter Diagnosis     ICD-10-CM    1. 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 He independently transitioned to the therapy space with the clinician. Today's session was a partial co-treat with OT. He was in good spirits, stating during the session 'I love therapy" while drawing smiley faces. GOALS:  Reny York will produce /sh/ in mixed positions at the word and phrase level with 80% accuracy. Targeted production of /sh/ at word level within structured speech sound tasks. Mirror is displayed in the room which Reny York used throughout all trials to assist in productions. He produced /sh/ at word level in initial position with 60% accuracy independently increasing to 70% accuracy given a verbal cue then to 100% accuracy given a clinician model; in medial position with 20% accuracy independently increasing to 90% accuracy given max prompting; in final position with 50% accuracy given a clinician model increasing to 90% accuracy given max prompting. Reny York demonstrated some frustration with this task evidenced by turning away from the clinician; however, he responded well to clinician prompts and reminders that clinician is there to help and make it easier for him. Should continue to target /sh/ at word level. [address] will produced /ch/ in mixed positions at the word and phrase level with 80% accuracy. DNT. Kei Dee will produced /v/ in mixed positions at word and phrase level with 80% accuracy. DNT. 4. Reny York will produce /z/ in mixed positions at word and phrase level with 80% accuracy. DNT.     [facility] will produce the final sounds within words at phrase level, independently, with 80% accuracy. DNT. 6. During structured/unstructured activities, Oumar Hopkins will demonstrate understanding and expression of spatial concepts with 80% accuracy. DNT. 7. During structured/unstructured tasks, Oumar Hopkins will use the regular plural -s to label/describe with 80% accuracy. Targeted use of regular plural -s within a structured task. Oumar Hopkins used regular plural -s to label throughout structured tasks with 80% accuracy independently. Oumar Hopkins is meeting this goal during structured tasks and should begin to target this skill in less structured tasks to determine carryover of this skill. 8. During structured/unstructured activities, Oumar Hopkins will follow 1-2 step directions containing temporal/sequential terms with 80% accuracy independently. Targeted 2-step directions containing sequential terms within a structured task. Oumar Hopkins followed 2-step directions containing sequential terms with 25% accuracy independently increasing to 100% accuracy given a verbal prompt from the clinician. He benefited from clinician prompting to "look at me first" before giving the verbal direction to ensure focused attention. 9. During structured/unstructured tasks, Oumar Hopkins will identify an item within a field of 3 given the object function with 80% accuracy. DNT. Other:Discussed session and patient progress with caregiver/family member after today's session.   Recommendations:Continue with Plan of Care

## 2025-06-19 ENCOUNTER — TELEPHONE (OUTPATIENT)
Dept: CARDIOLOGY | Facility: HOSPITAL | Age: 80
End: 2025-06-19
Payer: MEDICARE

## 2025-06-19 DIAGNOSIS — I50.22 CHRONIC HFREF (HEART FAILURE WITH REDUCED EJECTION FRACTION): Primary | ICD-10-CM

## 2025-06-19 NOTE — TELEPHONE ENCOUNTER
Pt called to inquire about a rec for EP cardiology.  She states her echo that was preformed 6/16 she was called and told they recommend a ICD placed and she wantsit to be with a [employer] doctor.    Please advise

## 2025-06-21 ENCOUNTER — APPOINTMENT (OUTPATIENT)
Dept: GENERAL RADIOLOGY | Facility: HOSPITAL | Age: 80
End: 2025-06-21
Payer: MEDICARE

## 2025-06-21 ENCOUNTER — HOSPITAL ENCOUNTER (EMERGENCY)
Facility: HOSPITAL | Age: 80
Discharge: HOME | End: 2025-06-21
Attending: EMERGENCY MEDICINE
Payer: MEDICARE

## 2025-06-21 VITALS
RESPIRATION RATE: 18 BRPM | SYSTOLIC BLOOD PRESSURE: 113 MMHG | DIASTOLIC BLOOD PRESSURE: 55 MMHG | OXYGEN SATURATION: 96 % | HEART RATE: 66 BPM | WEIGHT: 108 LBS | HEIGHT: 62 IN | TEMPERATURE: 97.6 F | BODY MASS INDEX: 19.88 KG/M2

## 2025-06-21 DIAGNOSIS — E78.2 MIXED HYPERLIPIDEMIA: ICD-10-CM

## 2025-06-21 DIAGNOSIS — I25.10 CORONARY ARTERY DISEASE INVOLVING NATIVE CORONARY ARTERY OF NATIVE HEART WITHOUT ANGINA PECTORIS: ICD-10-CM

## 2025-06-21 DIAGNOSIS — F41.9 ANXIETY: ICD-10-CM

## 2025-06-21 DIAGNOSIS — R00.2 PALPITATIONS: Primary | ICD-10-CM

## 2025-06-21 DIAGNOSIS — I10 PRIMARY HYPERTENSION: ICD-10-CM

## 2025-06-21 LAB
ALBUMIN SERPL-MCNC: 4.3 G/DL (ref 3.5–5.2)
ALBUMIN/GLOB SERPL: 1.4 G/DL
ALP SERPL-CCNC: 46 U/L (ref 39–117)
ALT SERPL W P-5'-P-CCNC: 10 U/L (ref 1–33)
ANION GAP SERPL CALCULATED.3IONS-SCNC: 15 MMOL/L (ref 5–15)
AST SERPL-CCNC: 12 U/L (ref 1–32)
BASOPHILS # BLD AUTO: 0.04 10*3/MM3 (ref 0–0.2)
BASOPHILS NFR BLD AUTO: 0.7 % (ref 0–1.5)
BILIRUB SERPL-MCNC: 0.4 MG/DL (ref 0–1.2)
BUN SERPL-MCNC: 22.3 MG/DL (ref 8–23)
BUN/CREAT SERPL: 21.2 (ref 7–25)
CALCIUM SPEC-SCNC: 9.5 MG/DL (ref 8.6–10.5)
CHLORIDE SERPL-SCNC: 105 MMOL/L (ref 98–107)
CO2 SERPL-SCNC: 22 MMOL/L (ref 22–29)
CREAT SERPL-MCNC: 1.05 MG/DL (ref 0.57–1)
DEPRECATED RDW RBC AUTO: 53.2 FL (ref 37–54)
EGFRCR SERPLBLD CKD-EPI 2021: 54.5 ML/MIN/1.73
EOSINOPHIL # BLD AUTO: 0.08 10*3/MM3 (ref 0–0.4)
EOSINOPHIL NFR BLD AUTO: 1.4 % (ref 0.3–6.2)
ERYTHROCYTE [DISTWIDTH] IN BLOOD BY AUTOMATED COUNT: 17.2 % (ref 12.3–15.4)
GLOBULIN UR ELPH-MCNC: 3 GM/DL
GLUCOSE SERPL-MCNC: 154 MG/DL (ref 65–99)
HCT VFR BLD AUTO: 42.7 % (ref 34–46.6)
HGB BLD-MCNC: 13.7 G/DL (ref 12–15.9)
HOLD SPECIMEN: NORMAL
IMM GRANULOCYTES # BLD AUTO: 0.01 10*3/MM3 (ref 0–0.05)
IMM GRANULOCYTES NFR BLD AUTO: 0.2 % (ref 0–0.5)
LIPASE SERPL-CCNC: 37 U/L (ref 13–60)
LYMPHOCYTES # BLD AUTO: 2.33 10*3/MM3 (ref 0.7–3.1)
LYMPHOCYTES NFR BLD AUTO: 39.4 % (ref 19.6–45.3)
MCH RBC QN AUTO: 27.2 PG (ref 26.6–33)
MCHC RBC AUTO-ENTMCNC: 32.1 G/DL (ref 31.5–35.7)
MCV RBC AUTO: 84.9 FL (ref 79–97)
MONOCYTES # BLD AUTO: 0.38 10*3/MM3 (ref 0.1–0.9)
MONOCYTES NFR BLD AUTO: 6.4 % (ref 5–12)
NEUTROPHILS NFR BLD AUTO: 3.08 10*3/MM3 (ref 1.7–7)
NEUTROPHILS NFR BLD AUTO: 51.9 % (ref 42.7–76)
NRBC BLD AUTO-RTO: 0 /100 WBC (ref 0–0.2)
NT-PROBNP SERPL-MCNC: 2564 PG/ML (ref 0–1800)
PLATELET # BLD AUTO: 229 10*3/MM3 (ref 140–450)
PMV BLD AUTO: 10.9 FL (ref 6–12)
POTASSIUM SERPL-SCNC: 4.1 MMOL/L (ref 3.5–5.2)
PROT SERPL-MCNC: 7.3 G/DL (ref 6–8.5)
QT INTERVAL: 402 MS
QTC INTERVAL: 446 MS
RBC # BLD AUTO: 5.03 10*6/MM3 (ref 3.77–5.28)
SODIUM SERPL-SCNC: 142 MMOL/L (ref 136–145)
TROPONIN T SERPL HS-MCNC: 16 NG/L
WBC NRBC COR # BLD AUTO: 5.92 10*3/MM3 (ref 3.4–10.8)
WHOLE BLOOD HOLD COAG: NORMAL
WHOLE BLOOD HOLD SPECIMEN: NORMAL

## 2025-06-21 PROCEDURE — 80053 COMPREHEN METABOLIC PANEL: CPT | Performed by: EMERGENCY MEDICINE

## 2025-06-21 PROCEDURE — 84484 ASSAY OF TROPONIN QUANT: CPT | Performed by: EMERGENCY MEDICINE

## 2025-06-21 PROCEDURE — 93005 ELECTROCARDIOGRAM TRACING: CPT | Performed by: EMERGENCY MEDICINE

## 2025-06-21 PROCEDURE — 83690 ASSAY OF LIPASE: CPT | Performed by: EMERGENCY MEDICINE

## 2025-06-21 PROCEDURE — 71045 X-RAY EXAM CHEST 1 VIEW: CPT

## 2025-06-21 PROCEDURE — 99284 EMERGENCY DEPT VISIT MOD MDM: CPT

## 2025-06-21 PROCEDURE — 85025 COMPLETE CBC W/AUTO DIFF WBC: CPT | Performed by: EMERGENCY MEDICINE

## 2025-06-21 PROCEDURE — 83880 ASSAY OF NATRIURETIC PEPTIDE: CPT | Performed by: EMERGENCY MEDICINE

## 2025-06-21 RX ORDER — ASPIRIN 81 MG/1
324 TABLET, CHEWABLE ORAL ONCE
Status: COMPLETED | OUTPATIENT
Start: 2025-06-21 | End: 2025-06-21

## 2025-06-21 RX ORDER — SODIUM CHLORIDE 0.9 % (FLUSH) 0.9 %
10 SYRINGE (ML) INJECTION AS NEEDED
Status: DISCONTINUED | OUTPATIENT
Start: 2025-06-21 | End: 2025-06-21 | Stop reason: HOSPADM

## 2025-06-21 RX ADMIN — ASPIRIN 243 MG: 81 TABLET, CHEWABLE ORAL at 04:23

## 2025-06-21 NOTE — ED PROVIDER NOTES
Subjective   History of Present Illness  This is a 80-year-old female with past medical history of coronary artery disease and anemia presenting to the emergency department with some palpitations.  Patient states that she felt some fluttering in her chest about an hour prior to arrival.  She used a portable pulse oximeter and checked her heart rate.  She states it was very low and then it was going to very high.  She got very concerned about this and presented to the emergency department.  Since then her symptoms have resolved.  She is not having any chest pain or shortness of breath.  No fevers or chills.  No headache or change in vision.  No focal weakness.    History provided by:  Patient   used: No        Review of Systems   Constitutional:  Negative for chills and fever.   HENT:  Negative for congestion, ear pain and sore throat.    Eyes:  Negative for visual disturbance.   Respiratory:  Negative for shortness of breath.    Cardiovascular:  Positive for palpitations. Negative for chest pain.   Gastrointestinal:  Negative for abdominal pain.   Genitourinary:  Negative for difficulty urinating.   Musculoskeletal:  Negative for arthralgias.   Skin:  Negative for rash.   Neurological:  Negative for dizziness, weakness and numbness.   Psychiatric/Behavioral:  Negative for agitation.        Past Medical History:   Diagnosis Date    Anemia     Anxiety and depression     Arthritis     Cataracts, bilateral     Coronary artery disease     Delayed emergence from anesthesia     Diabetes mellitus     Heart attack     Hyperlipidemia     Hypertension     Nausea     Peripheral neuropathy     PONV (postoperative nausea and vomiting)     Skin melanoma     Wears glasses        No Known Allergies    Past Surgical History:   Procedure Laterality Date    CARDIAC CATHETERIZATION      CARDIAC CATHETERIZATION N/A 3/12/2025    Procedure: Left Heart Cath - Left radial access;  Surgeon: Tashi Segovia MD;  Location:   [facility];  Service: Cardiovascular;  Laterality: N/A;    CATARACT EXTRACTION W/ INTRAOCULAR LENS IMPLANT Left 06/21/2023    Procedure: CATARACT PHACO EXTRACTION WITH INTRAOCULAR LENS IMPLANT LEFT;  Surgeon: Mina Hendrickson MD;  Location: [facility] OR;  Service: Ophthalmology;  Laterality: Left;    CATARACT EXTRACTION W/ INTRAOCULAR LENS IMPLANT Right 07/07/2023    Procedure: CATARACT PHACO EXTRACTION WITH INTRAOCULAR LENS IMPLANT RIGHT;  Surgeon: Mina Hendrickson MD;  Location: [facility] OR;  Service: Ophthalmology;  Laterality: Right;    CHOLECYSTECTOMY      COLONOSCOPY      CORONARY ARTERY BYPASS GRAFT N/A 1/28/2025    Procedure: MEDIAN STERNOTOMY, CORONARY ARTERY BYPASS GRAFTING X 5, UTILIZING THE LEFT INTERNAL MAMMARY ARTERY, ENDOSCOPIC VEIN HARVESTING OF THE LEFT SAPHENOUS VEIN, TRANSESOPHAGEAL ECHOCARDIOGRAM WITH ANESTHESIA;  Surgeon: Zeferino Del Castillo MD;  Location: [facility] OR;  Service: Cardiothoracic;  Laterality: N/A;    ENDOSCOPY      HYSTERECTOMY      SKIN CANCER EXCISION      TUBAL ABDOMINAL LIGATION         Family History   Problem Relation Age of Onset    Diabetes Mother     Dementia Mother     Coronary artery disease Father     Stroke Son     Coronary artery disease Son        Social History     Socioeconomic History    Marital status:     Number of children: 5   Tobacco Use    Smoking status: Never    Smokeless tobacco: Never   Vaping Use    Vaping status: Never Used   Substance and Sexual Activity    Alcohol use: Never    Drug use: Never    Sexual activity: Defer           Objective   Physical Exam  Vitals and nursing note reviewed.   Constitutional:       General: She is not in acute distress.     Appearance: She is not ill-appearing or toxic-appearing.   Eyes:      Conjunctiva/sclera: Conjunctivae normal.   Cardiovascular:      Rate and Rhythm: Normal rate and regular rhythm.   Pulmonary:      Effort: Pulmonary effort is normal. No respiratory distress.   Abdominal:       General: Abdomen is flat. There is no distension.      Palpations: There is no mass.      Tenderness: There is no abdominal tenderness. There is no guarding or rebound.   Musculoskeletal:         General: No deformity. Normal range of motion.   Skin:     General: Skin is warm.      Findings: No rash.   Neurological:      General: No focal deficit present.      Mental Status: She is alert and oriented to person, place, and time.      Motor: No weakness.         Procedures           ED Course  ED Course as of 06/21/25 0555   Sat Jun 21, 2025   0443 BP(!): 185/91 [JK]   0443 Temp: 97.6 °F (36.4 °C) [JK]   0443 Heart Rate: 80 [JK]   0443 Resp: 18 [JK]   0443 SpO2: 100 %  Interpretation:  Patient's repeat vitals, telemetry tracing, and pulse oximetry tracing were directly viewed and interpreted by myself.   O2 sat 100% on room air, interpreted as normal.  Telemetry rhythm strip revealed a rate of 80 bpm, interpreted as normal sinus rhythm [JK]   0553 CBC & Differential(!) [JK]   0553 BNP(!) [JK]   0553 Comprehensive Metabolic Panel(!) [JK]   0553 Lipase [JK]   0553 High Sensitivity Troponin T(!)  Interpretation:  Laboratory studies were reviewed and interpreted directly by myself.  CBC was unremarkable, CMP showed mild elevation in creatinine 1.05, BNP elevated 2564, troponin 16, lipase normal [JK]   0554 XR Chest 1 View  Interpretation:  Imaging was directly visualized by myself, per my interpretations, chest x-ray shows some cardiomegaly. [JK]   0554 Patient does have slightly elevated troponin, however significantly improved from previous lab as well as BNP [JK]   0554 Patient has not had any dysrhythmia or significant hemodynamic compromise while in the emergency department.  I do believe anxiety is contributing to her underlying symptoms, however she should follow-up with her primary cardiologist for further management.  Given strict return precautions.  Verbalized understanding. [JK]   0554 I had a discussion with  the patient/family regarding diagnosis, diagnostic results, treatment plan, and medications. The patient/family indicated understanding of these instructions. I spent adequate time at the bedside prior to discharge necessary to discuss the aftercare instructions, giving patient education, providing explanations of the results of our evaluations/findings, and my decision making to assure that the patient/family understand the plan of care. Time was allotted to answer questions at that time and throughout the ED course. Patient is required to maintain timely follow up, as discussed. I also discussed the potential for the development of an acute emergent condition requiring further evaluation, return to the ER, admission, or even surgical intervention.  I encouraged the patient to return to the emergency department immediately for any concerns, worsening symptoms, new complaints, or if symptoms persist and they are unable to seek follow-up in a timely fashion. The patient/family expressed understanding and agreement with this plan    Shared decision making:   After full review of the patient's clinical presentation, review of any work-up including but not limited to laboratory studies and radiology obtained, I had a discussion with the patient.  Treatment options were discussed as well as the risks, benefits and consequences.  I discussed all findings with the patient and family members if available.  During the discussion, treatment goals were understood by all as well as any misconceptions which were addressed with the patient.  Ample time was given for any questions they may have had.  They are in agreement with the treatment plan as well as final disposition. [JK]      ED Course User Index  [JK] Ryan Garcia MD                                                       Medical Decision Making  This is a 80-year-old female with a history of CAD presenting to the emergency department with some palpitations.   Patient had some fluttering in her chest and variable heart rates at home.  Right now the patient appears to be in sinus rhythm.  EKG is unremarkable.  Overall, the patient is nontoxic.  Afebrile.  IV access was established in the patient.  Placed on continuous telemetry monitoring.  Given the patient's presentation, differential is broad and will require further evaluation.  Workup initiated.      Differential diagnosis: CAD, ACS, peptic ulcer disease, dyspepsia, pneumonia, bronchitis, atypical chest pain, angina, musculoskeletal pain, dysrhythmia      Amount and/or Complexity of Data Reviewed  External Data Reviewed: labs, radiology, ECG and notes.     Details: External laboratories, imaging as well as notes were reviewed personally by myself.  All relevant studies were used to guide decision making.     Date of previous record: 3/19/2025    Source of note: Cardiology    Summary:  Patient was seen and evaluated for routine visit.  I did review basic laboratory studies on file as well as a previous chest x-ray and EKG.  Records reviewed    Labs: ordered. Decision-making details documented in ED Course.  Radiology: ordered and independent interpretation performed. Decision-making details documented in ED Course.  ECG/medicine tests: ordered and independent interpretation performed. Decision-making details documented in ED Course.    Risk  OTC drugs.  Prescription drug management.        Final diagnoses:   Palpitations   Anxiety   Primary hypertension   Coronary artery disease involving native coronary artery of native heart without angina pectoris   Mixed hyperlipidemia       ED Disposition  ED Disposition       ED Disposition   Discharge    Condition   Stable    Comment   --               Eddi Cheatham MD  [address] [place] [address]  [address] FLOOR  [facility] [address]  [phone]    Call in 1 day           Medication List      No changes were made to your prescriptions during this visit.            Ryan Garcia MD  06/21/25  [id number]

## 2025-07-08 LAB
QT INTERVAL: 402 MS
QTC INTERVAL: 446 MS

## (undated) DEVICE — ANTIBACTERIAL UNDYED BRAIDED (POLYGLACTIN 910), SYNTHETIC ABSORBABLE SUTURE: Brand: COATED VICRYL

## (undated) DEVICE — SUT ETHIB 1 CT1 30IN  X425H

## (undated) DEVICE — EZ GLIDE AORTIC CANNULA: Brand: EDWARDS LIFESCIENCES EZ GLIDE AORTIC CANNULA

## (undated) DEVICE — OASIS DRAIN, SINGLE, INLINE & ATS COMPATIBLE: Brand: OASIS

## (undated) DEVICE — CLTH CLENS READYCLEANSE PERI CARE PK/5

## (undated) DEVICE — GUIDE CATHETER: Brand: MACH1™

## (undated) DEVICE — SUT PROLN 3/0 V7 D/A 36IN 8976H

## (undated) DEVICE — PK HEART OPN 10

## (undated) DEVICE — BNDR ABD PREMIUM/UNIV 10IN 27TO48IN

## (undated) DEVICE — CVR PROB ULTRASND/TRANSD W/GEL LNG 18X250CM STRL

## (undated) DEVICE — BANDAGE,ELASTIC,ESMARK,STERILE,4"X9',LF: Brand: MEDLINE

## (undated) DEVICE — PK CATH CARD 10

## (undated) DEVICE — SHEET,DRAPE,53X77,STERILE: Brand: MEDLINE

## (undated) DEVICE — CATH GUIDE CONVEY AL1 .058IN 5FR

## (undated) DEVICE — AVID DUAL STAGE VENOUS DRAINAGE CANNULA: Brand: AVID DUAL STAGE VENOUS DRAINAGE CANNULA

## (undated) DEVICE — PK ATS CUST W CARDIOTOMY RESEVOIR

## (undated) DEVICE — PK PERFUS CUST W/CARDIOPLEGIA

## (undated) DEVICE — Device

## (undated) DEVICE — TR BAND RADIAL ARTERY COMPRESSION DEVICE: Brand: TR BAND

## (undated) DEVICE — DRSNG SURESITE WNDW 2.38X2.75

## (undated) DEVICE — CATH DIAG EXPO .045 FL3  5F 100CM

## (undated) DEVICE — CATH DIAG EXPO .045 FL3.5 5F 100CM

## (undated) DEVICE — MODEL BT2000 P/N 700287-012KIT CONTENTS: MANIFOLD WITH SALINE AND CONTRAST PORTS, SALINE TUBING WITH SPIKE AND HAND SYRINGE, TRANSDUCER: Brand: BT2000 AUTOMATED MANIFOLD KIT

## (undated) DEVICE — INTENDED FOR TISSUE SEPARATION, AND OTHER PROCEDURES THAT REQUIRE A SHARP SURGICAL BLADE TO PUNCTURE OR CUT.: Brand: BARD-PARKER ® STAINLESS STEEL BLADES

## (undated) DEVICE — MARKER,SKIN,W/RULER,DUAL,STOP: Brand: MEDLINE

## (undated) DEVICE — MICRO HVTSA, 0.5G AND HVTSA SOURCEMARK PRODUCT CODE M1206 AND M1206-01: Brand: EXOFIN MICRO HVTSA, 0.5G

## (undated) DEVICE — MODEL AT P65, P/N 701554-001KIT CONTENTS: HAND CONTROLLER, 3-WAY HIGH-PRESSURE STOPCOCK WITH ROTATING END AND PREMIUM HIGH-PRESSURE TUBING: Brand: ANGIOTOUCH® KIT

## (undated) DEVICE — LEVEL SENSORS PADS ARE USED TO ATTACH THE LEVEL SENSORS TO A HARD SHELL RESERVOIR. INCLUDES COUPLING GEL.: Brand: TERUMO® ADVANCED PERFUSION SYSTEM 1

## (undated) DEVICE — CATH DIAG EXPO .045 AR2 5F 100CM

## (undated) DEVICE — TOWEL,OR,DSP,ST,NATURAL,DLX,4/PK,20PK/CS: Brand: MEDLINE

## (undated) DEVICE — ELECTRD BLD EZ CLN MOD XLNG 2.75IN

## (undated) DEVICE — SUT MONOCRYL PLS ANTIB UND 3/0  PS1 27IN

## (undated) DEVICE — DRAPE,REIN 53X77,STERILE: Brand: MEDLINE

## (undated) DEVICE — ADAPT ANTEGRADE RETRGR

## (undated) DEVICE — TB SXN DLP RIGD MACROSUCKER 6F 3IN

## (undated) DEVICE — GW PERIPH GUIDERIGHT STD/EXCHNG/J/TIP SS 0.035IN 5X260CM

## (undated) DEVICE — SUT SILK 0/0 CT2 18IN C027D

## (undated) DEVICE — SHROD PENCL MEGADYNE ATTACHAVAC W/CONN/22MM

## (undated) DEVICE — DISPOSABLE TOURNIQUET CUFF SINGLE BLADDER, DUAL PORT AND QUICK CONNECT CONNECTOR: Brand: COLOR CUFF

## (undated) DEVICE — PATIENT RETURN ELECTRODE, SINGLE-USE, CONTACT QUALITY MONITORING, ADULT, WITH 9FT CORD, FOR PATIENTS WEIGING OVER 33LBS. (15KG): Brand: MEGADYNE

## (undated) DEVICE — CATH DIAG EXPO .056 LCB 6F 100CM

## (undated) DEVICE — 12 FOOT DISPOSABLE EXTENSION CABLE WITH SAFE CONNECT / SCREW-DOWN

## (undated) DEVICE — TUBING, SUCTION, 1/4" X 10', STRAIGHT: Brand: MEDLINE

## (undated) DEVICE — PAD,ARMBOARD,CONV,FOAM,2X8X20",12PR/CS: Brand: MEDLINE

## (undated) DEVICE — SPNG GZ WOVN 4X4IN 12PLY 10/BX STRL

## (undated) DEVICE — CANN AORT ROOT DLP VNT 14G 7F

## (undated) DEVICE — FLTR RESERV PERFUS INTERSEPT 02 STRL

## (undated) DEVICE — CANN VESL DLP 1WY BLNT/TP 3MM

## (undated) DEVICE — MYOCARDIAL PROBE NON-PYROGENIC: Brand: DEROYAL

## (undated) DEVICE — PENCL ROCKRSWCH MEGADYNE W/HOLSTR 10FT SS

## (undated) DEVICE — SUCTION CANISTER 2500CC: Brand: DEROYAL

## (undated) DEVICE — SUT PROLN SURGILENE SURGIPRO 8 0 24IN BL

## (undated) DEVICE — SUT PROLN 7/0 BV1 D/A 24IN 8702H

## (undated) DEVICE — GLV SURG BIOGELULTRATOUCH POLYISPRN PF LF SZ7 STRL

## (undated) DEVICE — SUT PROLN 6/0 C1 D/A 30IN 8706H

## (undated) DEVICE — SUT PROLN 4/0 V7 36IN 8975H

## (undated) DEVICE — TB SXN SURG DLP MALL/CARD SFT/TP 6F 6IN

## (undated) DEVICE — CVR PROB ULTRASND/TRANSD W/GEL 7X11IN STRL

## (undated) DEVICE — BLD SCLPL BEAVR MINI STR 2BVL 180D LF

## (undated) DEVICE — BANDAGE,GAUZE,BULKEE II,4.5"X4.1YD,STRL: Brand: MEDLINE

## (undated) DEVICE — COVER,MAYO STAND,STERILE: Brand: MEDLINE

## (undated) DEVICE — 1LYRTR 16FR10ML100%SILTMPS SNP: Brand: MEDLINE INDUSTRIES, INC.

## (undated) DEVICE — GLIDESHEATH SLENDER STAINLESS STEEL KIT: Brand: GLIDESHEATH SLENDER

## (undated) DEVICE — CATH DIAG EXPO M/ PK 5F FL4/FR4 PIG

## (undated) DEVICE — CATH DIAG EXPO .045 AR1 5F 100CM

## (undated) DEVICE — LUBE JELLY SURGILUBE TB/FLIPCAP 4.5OZ

## (undated) DEVICE — SUT PROLN CARDIO V5 3/0 36IN 8936H

## (undated) DEVICE — SENSR CERBRL O2 PK/2

## (undated) DEVICE — SYS VASOVIEW2 HEMOPRO ENDOSCOPIC HARVST VESL

## (undated) DEVICE — SYR LUERLOK 30CC

## (undated) DEVICE — TRAP FLD MINIVAC MEGADYNE 100ML

## (undated) DEVICE — KIT,ANTI FOG,W/SPONGE & FLUID,SOFT PACK: Brand: MEDLINE

## (undated) DEVICE — SUT SILK 2 SUTUPAK TIE 60IN SA8H 2STRAND

## (undated) DEVICE — CATHETER,FOLEY,100%SILICONE,18FR,10ML,LF: Brand: MEDLINE

## (undated) DEVICE — SUT SILK 4/0 TIES 18IN A183H

## (undated) DEVICE — 3M™ MEDIPORE™ H SOFT CLOTH SURGICAL TAPE, 2863, 3 IN X 10 YD, 12/CASE: Brand: 3M™ MEDIPORE™

## (undated) DEVICE — SUT SILK 2/0 CT1 CR8 18IN C022D

## (undated) DEVICE — DRSNG SURESITE WNDW 4X4.5

## (undated) DEVICE — BNDG,ELSTC,MATRIX,STRL,4"X5YD,LF,HOOK&LP: Brand: MEDLINE

## (undated) DEVICE — [HIGH FLOW INSUFFLATOR,  DO NOT USE IF PACKAGE IS DAMAGED,  KEEP DRY,  KEEP AWAY FROM SUNLIGHT,  PROTECT FROM HEAT AND RADIOACTIVE SOURCES.]: Brand: PNEUMOSURE

## (undated) DEVICE — ADULT, W/LG. BACK PAD, RADIOTRANSPARENT ELEMENT AND LEAD WIRE COMPATIBLE W/: Brand: DEFIBRILLATION ELECTRODES

## (undated) DEVICE — APPL CHLORAPREP TINTED 26ML TEAL

## (undated) DEVICE — FOGARTY SPRING CLIPS 6MM: Brand: FOGARTY SOFTJAW